# Patient Record
Sex: FEMALE | Race: WHITE | NOT HISPANIC OR LATINO | Employment: OTHER | URBAN - METROPOLITAN AREA
[De-identification: names, ages, dates, MRNs, and addresses within clinical notes are randomized per-mention and may not be internally consistent; named-entity substitution may affect disease eponyms.]

---

## 2018-04-14 ENCOUNTER — HOSPITAL ENCOUNTER (OUTPATIENT)
Dept: RADIOLOGY | Facility: MEDICAL CENTER | Age: 63
End: 2018-04-14
Attending: NURSE PRACTITIONER
Payer: OTHER GOVERNMENT

## 2018-04-14 DIAGNOSIS — M54.5 LOW BACK PAIN, UNSPECIFIED BACK PAIN LATERALITY, UNSPECIFIED CHRONICITY, WITH SCIATICA PRESENCE UNSPECIFIED: ICD-10-CM

## 2018-04-14 DIAGNOSIS — M25.552 BILATERAL HIP PAIN: ICD-10-CM

## 2018-04-14 DIAGNOSIS — M25.551 BILATERAL HIP PAIN: ICD-10-CM

## 2018-04-14 PROCEDURE — 72100 X-RAY EXAM L-S SPINE 2/3 VWS: CPT

## 2018-04-14 PROCEDURE — 73522 X-RAY EXAM HIPS BI 3-4 VIEWS: CPT

## 2018-12-22 ENCOUNTER — HOSPITAL ENCOUNTER (OUTPATIENT)
Dept: RADIOLOGY | Facility: MEDICAL CENTER | Age: 63
End: 2018-12-22
Attending: NURSE PRACTITIONER
Payer: OTHER GOVERNMENT

## 2018-12-22 DIAGNOSIS — M25.552 BILATERAL HIP PAIN: ICD-10-CM

## 2018-12-22 DIAGNOSIS — M25.551 BILATERAL HIP PAIN: ICD-10-CM

## 2018-12-22 DIAGNOSIS — M54.5 CHRONIC LOW BACK PAIN, UNSPECIFIED BACK PAIN LATERALITY, WITH SCIATICA PRESENCE UNSPECIFIED: ICD-10-CM

## 2018-12-22 DIAGNOSIS — G89.29 CHRONIC LOW BACK PAIN, UNSPECIFIED BACK PAIN LATERALITY, WITH SCIATICA PRESENCE UNSPECIFIED: ICD-10-CM

## 2018-12-22 PROCEDURE — 72148 MRI LUMBAR SPINE W/O DYE: CPT

## 2019-05-08 ENCOUNTER — HOSPITAL ENCOUNTER (OUTPATIENT)
Dept: RADIOLOGY | Facility: MEDICAL CENTER | Age: 64
End: 2019-05-08

## 2019-05-21 ENCOUNTER — HOSPITAL ENCOUNTER (OUTPATIENT)
Dept: RADIOLOGY | Facility: MEDICAL CENTER | Age: 64
End: 2019-05-21
Attending: NURSE PRACTITIONER
Payer: OTHER GOVERNMENT

## 2019-05-21 DIAGNOSIS — Z12.31 SCREENING MAMMOGRAM, ENCOUNTER FOR: ICD-10-CM

## 2019-05-21 PROCEDURE — 77067 SCR MAMMO BI INCL CAD: CPT

## 2019-05-30 ENCOUNTER — OFFICE VISIT (OUTPATIENT)
Dept: MEDICAL GROUP | Facility: LAB | Age: 64
End: 2019-05-30
Payer: OTHER GOVERNMENT

## 2019-05-30 VITALS
WEIGHT: 155 LBS | BODY MASS INDEX: 27.46 KG/M2 | RESPIRATION RATE: 14 BRPM | SYSTOLIC BLOOD PRESSURE: 110 MMHG | TEMPERATURE: 96.9 F | HEART RATE: 99 BPM | OXYGEN SATURATION: 93 % | HEIGHT: 63 IN | DIASTOLIC BLOOD PRESSURE: 60 MMHG

## 2019-05-30 DIAGNOSIS — M48.062 SPINAL STENOSIS OF LUMBAR REGION WITH NEUROGENIC CLAUDICATION: ICD-10-CM

## 2019-05-30 DIAGNOSIS — F17.209 TOBACCO USE DISORDER, CONTINUOUS: ICD-10-CM

## 2019-05-30 DIAGNOSIS — M17.12 PRIMARY OSTEOARTHRITIS OF LEFT KNEE: ICD-10-CM

## 2019-05-30 DIAGNOSIS — F51.01 PRIMARY INSOMNIA: ICD-10-CM

## 2019-05-30 DIAGNOSIS — Z23 NEED FOR PNEUMOCOCCAL VACCINATION: ICD-10-CM

## 2019-05-30 DIAGNOSIS — G89.29 OTHER CHRONIC PAIN: ICD-10-CM

## 2019-05-30 DIAGNOSIS — K63.5 POLYP OF COLON, UNSPECIFIED PART OF COLON, UNSPECIFIED TYPE: ICD-10-CM

## 2019-05-30 DIAGNOSIS — F41.9 ANXIETY: ICD-10-CM

## 2019-05-30 PROBLEM — M17.9 OSTEOARTHRITIS OF KNEE: Status: ACTIVE | Noted: 2019-05-30

## 2019-05-30 PROBLEM — M48.061 SPINAL STENOSIS OF LUMBAR REGION: Status: ACTIVE | Noted: 2019-05-30

## 2019-05-30 PROCEDURE — 90471 IMMUNIZATION ADMIN: CPT | Performed by: NURSE PRACTITIONER

## 2019-05-30 PROCEDURE — 90732 PPSV23 VACC 2 YRS+ SUBQ/IM: CPT | Performed by: NURSE PRACTITIONER

## 2019-05-30 PROCEDURE — 99215 OFFICE O/P EST HI 40 MIN: CPT | Mod: 25 | Performed by: NURSE PRACTITIONER

## 2019-05-30 RX ORDER — OLANZAPINE AND FLUOXETINE 12; 25 MG/1; MG/1
1 CAPSULE ORAL DAILY
Qty: 90 CAP | Refills: 3 | Status: SHIPPED | OUTPATIENT
Start: 2019-05-30 | End: 2019-12-30 | Stop reason: SDUPTHER

## 2019-05-30 RX ORDER — PROPRANOLOL HYDROCHLORIDE 10 MG/1
1 TABLET ORAL DAILY
COMMUNITY
Start: 2019-04-09 | End: 2019-09-16 | Stop reason: SDUPTHER

## 2019-05-30 RX ORDER — QUETIAPINE FUMARATE 50 MG/1
50 TABLET, FILM COATED ORAL EVERY EVENING
Qty: 30 TAB | Refills: 2 | Status: SHIPPED | OUTPATIENT
Start: 2019-05-30 | End: 2019-08-30 | Stop reason: SDUPTHER

## 2019-05-30 RX ORDER — OLANZAPINE AND FLUOXETINE 12; 25 MG/1; MG/1
CAPSULE ORAL
COMMUNITY
End: 2019-05-30 | Stop reason: SDUPTHER

## 2019-05-30 ASSESSMENT — PATIENT HEALTH QUESTIONNAIRE - PHQ9: CLINICAL INTERPRETATION OF PHQ2 SCORE: 0

## 2019-05-30 NOTE — PROGRESS NOTES
CC  New patient to establish care    HPI  Penelope is a 63-year-old female, new patient to our office today, previously established with [facility].  Past medical history of smoking addiction, insomnia, anxiety, chronic pain and colon polyps.  Denies any history of cancer, MI or stroke.  She is  with 6 children.  See medication list below.  Also followed by [facility].  She is not followed by chronic pain management or psychiatry.  Last labs 2 weeks ago.  Mammogram is up-to-date.  Last colonoscopy was done about a year ago.  She cannot recall when her last Pap smear was.    Tobacco use disorder, continuous  Started in her 20's.  Plans on quitting when she moves, currently living with her mother who smokes like a smoke stack, per patient, making it difficult for her to quit.     Insomnia  Chronic issue.  Sleeps well with xanax and 3 to 5 pills of an OTC sleep aid from [employer].  States that she has a lot of anxiety about sleeping and functions very poorly when she does not sleep well.    Anxiety  Chronic issue.  Stable with olanzapine-fluoxetine -stating that her moods are the best that they have been in a really long time.      Osteoarthritis of knee - left. (right is replaced)  Chronic issue.  Pain controlled with hydrocodone.  Not currently seeing orthopedics.    Other chronic pain  Suffers from chronic pain in low back that radiates down left hip and down entire left leg (front) and into foot.  Takes 3 hydrocodone per day for this.  Awaiting appt with [facility].  Known lumbar degenerative disc disease, facet arthritis and spinal stenosis  -seen on MRI 12/2018.    Colon polyps - [facility] - q 3 yrs  Last colonoscopy summer of 2018.  Denies any problems with her bowels.    Family History   Problem Relation Age of Onset   • Cancer Paternal Uncle         Colon CA   • Other Mother         dementia   • Cancer Paternal Aunt         colon cancer    • Cancer Paternal Uncle         colon  cancer      Past Surgical History:   Procedure Laterality Date   • KNEE ARTHROPLASTY TOTAL  2/11/2014    Performed by Kushal Rivera M.D. at SURGERY [facility] ORS   • APPENDECTOMY     • CATARACT EXTRACTION WITH IOL      bilateral   • GADIEL BY LAPAROSCOPY     • GYN SURGERY      tubal   • OTHER      facial lift - 2016 - Dr. Faust       Current Outpatient Prescriptions:   •  propranolol, 1 Tab, Oral, DAILY, Taking  •  Olanzapine-Fluoxetine HCl, 1 Tab, Oral, DAILY  •  quetiapine, 50 mg, Oral, Q EVENING  •  ALPRAZolam, 1 mg, Oral, HS PRN, Taking  •  HYDROcodone-acetaminophen, 1-2 Tab, Oral, Q4HRS PRN (Patient taking differently: 1-2 Tab, Oral, EVERY 4 HOURS PRN, Severe Pain, (Pain scale 4-6).), Taking  •  fluticasone-salmeterol, 1 Puff, Inhalation, PRN, Taking  •  albuterol, 2 Puff, Inhalation, Q6HRS PRN, Taking    Review Of Systems  Denies fever, chills, or sweats, unexplained weight changes.  Skin: negative for rash, changing moles, abnormal pigmentation, hair or nail changes.  Eyes: negative for visual blurring, double vision, eye pain, floaters and discharge from eyes  Ears/Nose/Throat: negative for oral or dental problem, hoarseness or frequent URI.  Respiratory: negative for persistent cough, hemoptysis, dyspnea, wheezing  Cardiovascular: Denies chest pain or tachycardia.  Breast: Denies breast tenderness, mass,  changes in size or contour, or abnormal cyclic discomfort.  Gastrointestinal: Black or bloody stools.  Denies chronic abdominal pain.  Genitourinary: Urea, frequency, or hematuria.  Musculoskeletal: Knees and her back as well as her left leg  Neurologic: negative for new or changing headaches  Psychiatric: Satiety.  Negative for depression.  Positive for sleep disturbance without medication.  Hematologic/Lymphatic/Immunologic: negative for pallor, unusual bruising.  Endocrine: Occasional temperature intolerance.    Exam:  /60 (BP Location: Left arm, Patient Position: Sitting, BP Cuff Size:  "Large adult)   Pulse 99   Temp 36.1 °C (96.9 °F)   Resp 14   Ht 1.6 m (5' 3\")   Wt 70.3 kg (155 lb)   SpO2 93%   Gen. appears healthy in no distress   Skin appropriate for sex and age   Neck trachea is midline  Lungs unlabored breathing  Heart regular rate  Neuro gait and station normal   Psych appropriate, calm, interactive, well-groomed    Assessment / Plan / Medical Decision makin. Tobacco use disorder, continuous  -Urged her in the strongest way to stop smoking and she states that she is trying.  Occasions today which she declines.    2. Primary insomnia  -Unfortunately dependent on alprazolam for sleep right now which I discussed with her that I cannot prescribe in combination with opiates because of the black box warning between opiates and benzodiazepines, putting her at high risk for respiratory depression and death.  We will do a trial of Seroquel, allowing at least 8 hours for sleep.  Half an alprazolam which we discussed the potential for benzodiazepine withdrawal states that she has gone a few nights without it in the past and not had any problems.  Follow-up with me in a few days on email regarding how she is sleeping.  She will return here in 1 month in person.  - quetiapine (SEROQUEL) 50 MG tablet; Take 1 Tab by mouth every evening.  Dispense: 30 Tab; Refill: 2    3. Anxiety  -Stable.  Continue same.    4. Primary osteoarthritis of left knee  -She describes her left knee pain is mild and declines a referral to see orthopedics.    5. Other chronic pain  -Not followed by pain management yet, awaiting an appointment with [facility].  Did not need a refill of hydrocodone today.    6. Spinal stenosis of lumbar region with neurogenic claudication  -Reviewed her MRI with her in depth.  She is looking forward to starting physical therapy and seeing [facility].    7. Polyp of colon, unspecified part of colon, unspecified type  -Colonoscopy is up-to-date.    8. Need for pneumococcal " vaccination  I have placed the below orders and discussed them with Dr. Bullard. the MA is performing the below orders under the direction of Dr. TOBAR  - Pneumococal Polysaccharide Vaccine 23-Valent =>1YO SQ/IM      She will follow-up with me in 1 month pain management, discussion of anxiety, sleep and a Pap smear.    Total face to face time40 minutes of which over 50% of this visit is spent in counseling, education and outlining a plan of treatment and coordination of care for the above conditions. This included but was not limited to discussion of medication options and potential risks related to the medications, referral and specialty care options. All patient questions were answered

## 2019-05-30 NOTE — LETTER
[facility]  Laura Benjamin, A.P.N.  [id number]  [place] [address]  Fax: [phone]   Authorization for Release/Disclosure of   Protected Health Information   Name: VERONICA GRANGER : 1955 SSN: [id number]   Address: [address] [place], [place] [address] [address] Phone:    [phone] (home)    I authorize the entity listed below to release/disclose the PHI below to:   [facility]/Laura Benjamin, A.P.N. and Laura Benjamin, A.P.N.   Provider or Entity Name:  [facility]   Address   City, [place] [facility] [place], Zip:               [address] [place] [address]   Phone:  [phone]      Fax:      [phone]        Reason for request: continuity of care   Information to be released:    [ X ] LAST COLONOSCOPY,  including any PATH REPORT and follow-up  [ X ] LAST FIT/COLOGUARD RESULT [  ] LAST DEXA  [  ] LAST MAMMOGRAM  [  ] LAST PAP  [  ] LAST LABS [  ] RETINA EXAM REPORT  [  ] IMMUNIZATION RECORDS  [  ] Release all info      [  ] Check here and initial the line next to each item to release ALL health information INCLUDING  _____ Care and treatment for drug and / or alcohol abuse  _____ HIV testing, infection status, or AIDS  _____ Genetic Testing    DATES OF SERVICE OR TIME PERIOD TO BE DISCLOSED: _____________  I understand and acknowledge that:  * This Authorization may be revoked at any time by you in writing, except if your health information has already been used or disclosed.  * Your health information that will be used or disclosed as a result of you signing this authorization could be re-disclosed by the recipient. If this occurs, your re-disclosed health information may no longer be protected by State or Federal laws.  * You may refuse to sign this Authorization. Your refusal will not affect your ability to obtain treatment.  * This Authorization becomes effective upon signing and will  on (date) __________.      If no date is indicated, this Authorization  will  one (1) year from the signature date.    Name: Penelope Segoviadwin    Signature:    Date:     2019       PLEASE FAX REQUESTED RECORDS BACK TO: [phone]

## 2019-05-30 NOTE — ASSESSMENT & PLAN NOTE
Suffers from chronic pain in low back that radiates down left hip and down entire left leg (front) and into foot.  Takes 3 hydrocodone per day for this.  Awaiting appt with [facility].

## 2019-06-27 ENCOUNTER — OFFICE VISIT (OUTPATIENT)
Dept: MEDICAL GROUP | Facility: LAB | Age: 64
End: 2019-06-27
Payer: OTHER GOVERNMENT

## 2019-06-27 ENCOUNTER — HOSPITAL ENCOUNTER (OUTPATIENT)
Facility: MEDICAL CENTER | Age: 64
End: 2019-06-27
Attending: NURSE PRACTITIONER
Payer: OTHER GOVERNMENT

## 2019-06-27 VITALS
HEART RATE: 58 BPM | HEIGHT: 63 IN | TEMPERATURE: 97.9 F | WEIGHT: 153 LBS | BODY MASS INDEX: 27.11 KG/M2 | DIASTOLIC BLOOD PRESSURE: 68 MMHG | OXYGEN SATURATION: 93 % | RESPIRATION RATE: 16 BRPM | SYSTOLIC BLOOD PRESSURE: 110 MMHG

## 2019-06-27 DIAGNOSIS — Z01.419 ENCOUNTER FOR GYNECOLOGICAL EXAMINATION: ICD-10-CM

## 2019-06-27 DIAGNOSIS — Z12.4 SCREENING FOR MALIGNANT NEOPLASM OF CERVIX: ICD-10-CM

## 2019-06-27 DIAGNOSIS — M48.062 SPINAL STENOSIS OF LUMBAR REGION WITH NEUROGENIC CLAUDICATION: ICD-10-CM

## 2019-06-27 DIAGNOSIS — R73.01 ELEVATED FASTING BLOOD SUGAR: ICD-10-CM

## 2019-06-27 DIAGNOSIS — R30.0 DYSURIA: ICD-10-CM

## 2019-06-27 DIAGNOSIS — F51.01 PRIMARY INSOMNIA: ICD-10-CM

## 2019-06-27 DIAGNOSIS — Z79.891 CHRONIC USE OF OPIATE DRUGS THERAPEUTIC PURPOSES: ICD-10-CM

## 2019-06-27 DIAGNOSIS — R79.89 ELEVATED LIVER FUNCTION TESTS: ICD-10-CM

## 2019-06-27 DIAGNOSIS — E55.9 VITAMIN D DEFICIENCY: ICD-10-CM

## 2019-06-27 LAB
APPEARANCE UR: CLEAR
BILIRUB UR STRIP-MCNC: NORMAL MG/DL
COLOR UR AUTO: YELLOW
CYTOLOGY REG CYTOL: NORMAL
GLUCOSE UR STRIP.AUTO-MCNC: NORMAL MG/DL
KETONES UR STRIP.AUTO-MCNC: NORMAL MG/DL
LEUKOCYTE ESTERASE UR QL STRIP.AUTO: NORMAL
NITRITE UR QL STRIP.AUTO: NORMAL
PH UR STRIP.AUTO: 7 [PH] (ref 5–8)
PROT UR QL STRIP: 30 MG/DL
RBC UR QL AUTO: NORMAL
SP GR UR STRIP.AUTO: 1.02
UROBILINOGEN UR STRIP-MCNC: 0.2 MG/DL

## 2019-06-27 PROCEDURE — 99396 PREV VISIT EST AGE 40-64: CPT | Mod: 25 | Performed by: NURSE PRACTITIONER

## 2019-06-27 PROCEDURE — 81002 URINALYSIS NONAUTO W/O SCOPE: CPT | Performed by: NURSE PRACTITIONER

## 2019-06-27 PROCEDURE — 99214 OFFICE O/P EST MOD 30 MIN: CPT | Mod: 25 | Performed by: NURSE PRACTITIONER

## 2019-06-27 PROCEDURE — 88175 CYTOPATH C/V AUTO FLUID REDO: CPT

## 2019-06-27 RX ORDER — HYDROCODONE BITARTRATE AND ACETAMINOPHEN 7.5; 325 MG/1; MG/1
1 TABLET ORAL 2 TIMES DAILY PRN
Qty: 56 TAB | Refills: 0 | Status: SHIPPED | OUTPATIENT
Start: 2019-06-27 | End: 2019-09-16 | Stop reason: SDUPTHER

## 2019-06-27 RX ORDER — HYDROCODONE BITARTRATE AND ACETAMINOPHEN 7.5; 325 MG/1; MG/1
1 TABLET ORAL 2 TIMES DAILY PRN
Qty: 56 TAB | Refills: 0 | Status: SHIPPED | OUTPATIENT
Start: 2019-08-22 | End: 2019-09-16 | Stop reason: SDUPTHER

## 2019-06-27 RX ORDER — HYDROCODONE BITARTRATE AND ACETAMINOPHEN 7.5; 325 MG/1; MG/1
1 TABLET ORAL 2 TIMES DAILY PRN
Qty: 56 TAB | Refills: 0 | Status: SHIPPED | OUTPATIENT
Start: 2019-07-25 | End: 2019-09-16 | Stop reason: SDUPTHER

## 2019-06-27 NOTE — PROGRESS NOTES
Chief Complaint   Patient presents with   • Annual Exam     PAP       HPI:   Penelope is a 63-year-old established female here for chronic pain management.  Previously her Norco was prescribed by Dr Smith, previous primary care physician.  Chronic pain recheck for: Spinal stenosis of her lumbar spine.  Last dose of controlled substance: yesterday - previously rx by Dr. Smith  Chronic pain treated with norco taken 2-3 times a day -tells me she is able to reduce this to 2/day in the summer.  Last percocet was about 4 d ago following a plastic surgery procedure.   [facility]aw [facility] yesterday - planning on PT  She  reports that she does not drink alcohol.  She  reports that she does not use drugs.  She was previously taking Xanax for sleep, I prescribed Seroquel at her last visit and this is working great.  She has discontinued her use of Xanax.  Interval history: this is her first visit for pain with me    Consequences of Chronic Opiate therapy:  (5 A's)  Analgesia: Compared to no treatment or prior treatment, pain is currently not changed  Activity: not changed  Adverse Events:denies constipation, dry mouth, itchy skin, nausea and sedation  Aberrant Behaviors: She reports she is taking medication as prescribed and is not veering from agreed treatment regimen or provider recommendations. There have been no inappropriate refills or lost/stolen meds reported.  Affect/Mood: Pain is not impacting patient's mood.  Patient denies depression/anxiety.    Nonnarcotic treatments that are being used: physical therapy, topical agents, ice and heat.     Last imaging: Uncertain, will obtain from [facility]    Opioid Risk Score: 2    Interpretation of Opioid Risk Score   Score 0-3 = Low risk of abuse. Do UDS at least once per year.  Score 4-7 = Moderate risk of abuse. Do UDS 1-4 times per year.  Score 8+ = High risk of abuse. Refer to specialist.    Last order of CONTROLLED SUBSTANCE TREATMENT AGREEMENT was found on 12/28/2017 from Office  "Visit on 2017     UDS Summary                URINE DRUG SCREEN Next Due 2019      Done 2018 [facility] PAIN MANAGEMENT SCREEN     Patient has more history with this topic...        UDS will be collected initially today in our office.    I have reviewed the medical records, the Prescription Monitoring Program and I have determined that controlled substance treatment is medically indicated.     #2-vitamin D deficiency: She tells me this has been an issue for her on and off over the years.  Not currently taking any vitamin D.  #3-elevated fasting blood sugar: She had lab work done in May that showed a fasting blood sugar of 107.  She tells me that she goes back and forth in between eating too much sugar.  She has never been diagnosed with type 2 diabetes.  #4- elevated liver function testing: She does not drink alcohol, takes about 2-3 Tylenol per day as part of her pain pill and is not having any abdominal issues.  She denies any history of being told that her liver is irritated.    Past medical, surgical, family, and social history is reviewed and updated in Epic chart by me today.   Medications and allergies reviewed and updated in Epic chart by me today.     ROS:   As documented in history of present illness above    Exam:  /68 (BP Location: Left arm, Patient Position: Sitting, BP Cuff Size: Adult)   Pulse (!) 58   Temp 36.6 °C (97.9 °F)   Resp 16   Ht 1.6 m (5' 3\")   Wt 69.4 kg (153 lb)   SpO2 93%   Constitutional: Alert, no distress, plus 3 vital signs  Skin:  Warm, dry, no rashes invisible areas  Eye: Equal, round and reactive, conjunctiva clear  ENMT: Lips without lesions  Respiratory: Unlabored respiration  Cardiovascular: Normal rate and rhythm  Psych: Alert, pleasant, well-groomed, normal affect    Assessment/ Plan / Medical Decision makin. Chronic pain from:    Spinal stenosis of her lumbar spine  2. Chronic use of opiate drugs therapeutic purposes  -Overall impression that " this patient is benefiting from opioid therapy and that the benefits outweigh the risks of continued use. yes   Controlled substance agreement and urine drug screen updated today  - Additional lab: CMP to assess liver and renal function - UTD   - Rx refill prescriptions are done for 3 months, No early refills.   - Referral to pain management no  In prescribing controlled substances to this patient, I certify that I have obtained and reviewed the medical history.. I have also made a good abelardo effort to obtain applicable records from other providers who have treated the patient and records did not demonstrate any increased risk of substance abuse that would prevent me from prescribing controlled substances.     I have conducted a physical exam and documented it. I have reviewed Ms. Saini’s prescription history as maintained by the [place] Prescription Monitoring Program.     I have assessed the patient’s risk for abuse, dependency, and addiction using the validated Opioid Risk Tool available at https://www.mdcalc.com/bqlxzr-mjit-thgr-ort-narcotic-abuse.     Given the above, I believe the benefits of controlled substance therapy outweigh the risks. The reasons for prescribing controlled substances include non-narcotic, oral analgesic alternatives have been inadequate for pain control. Accordingly, I have discussed the risk and benefits, treatment plan, and alternative therapies with the patient.     3.  Vitamin D deficiency: Encouraged her to start 2 to 4000 international units of vitamin D.  We will recheck this in about 6 weeks.    #4-elevated fasting blood sugar: Encouraged her to reduce her intake of sugar in her diet, increase exercise and recheck labs in 6 weeks with an A1c.    #5- elevated liver function testing: Unknown etiology.  Recommend a repeat in 6 weeks and if persistently elevated, liver ultrasound.

## 2019-06-27 NOTE — PROGRESS NOTES
Chief Complaint   Patient presents with   • Annual Exam     PAP       HPI:  Penelope is a 63 y.o.  female who presents for annual GYN exam.  Regarding her health maintenance:   Last pap: unknown  Abnormal Pap hx: a very long time ago - decades  Periods: menopausal  Last Mammo:5/21/2019  Last colonoscopy: 10/2018  Bone density test:N\A   Last Lab: just completed  Last Td:declines  Influenza vaccination:current   Pneumococcal vaccination: Just completed  Hx STD''s: no   Regular exercise: yes      meds:   Current Outpatient Prescriptions   Medication Sig Dispense Refill   • propranolol (INDERAL) 10 MG Tab Take 1 Tab by mouth every day.     • Olanzapine-Fluoxetine HCl 12-25 MG Cap Take 1 Tab by mouth every day. 90 Cap 3   • quetiapine (SEROQUEL) 50 MG tablet Take 1 Tab by mouth every evening. 30 Tab 2   •       • hydrocodone-acetaminophen (NORCO) 7.5-325 MG per tablet Take 1-2 Tabs by mouth every four hours as needed ((Pain scale 4-6).). (Patient taking differently: Take 1-2 Tabs by mouth every four hours as needed for Severe Pain ((Pain scale 4-6).).) 60 Each 0   • fluticasone-salmeterol (ADVAIR DISKUS) 250-50 MCG/DOSE AEPB Inhale 1 Puff by mouth as needed. Indications: Asthma     • albuterol (PROAIR HFA) 108 (90 BASE) MCG/ACT AERS Inhale 2 Puffs by mouth every 6 hours as needed. Indications: Asthma       No current facility-administered medications for this visit.        Allergies: No Known Allergies    family:   Family History   Problem Relation Age of Onset   • Cancer Paternal Uncle         Colon CA   • Other Mother         dementia   • Cancer Paternal Aunt         colon cancer    • Cancer Paternal Uncle         colon cancer        social hx:   Social History     Social History   • Marital status:      Spouse name: N/A   • Number of children: N/A   • Years of education: N/A     Occupational History   • Not on file.     Social History Main Topics   • Smoking status: Current Every Day Smoker     Packs/day: 0.50  "    Years: 34.00     Types: Cigarettes   • Smokeless tobacco: Never Used      Comment: 0.5-1 ppd   • Alcohol use No   • Drug use: No   • Sexual activity: Yes     Partners: Male      Comment: ; 6 kids; wk: retired - [employer]     Other Topics Concern   • Not on file     Social History Narrative   • No narrative on file       ROS:  No fever, chills, sweats.   No polydipsia, polyuria, temperature intolerance, significant weight changes   No visual changes, blurred vision.  No chest pain, palpitations, peripheral swelling   No chronic cough, shortness of breath, dyspnea with exertion.   No dysphagia, odynophagia, black or bloody stools.   No abdominal pain, nausea, persistent diarrhea, constipation   She does complain of dysuria and frequency, present now for several days.  No hematuria.  No rash, pruritis, pigment changes.   No focal weakness, syncope, headache, confusion, persistent numbness.     PHYSICAL EXAMINATION:  /68 (BP Location: Left arm, Patient Position: Sitting, BP Cuff Size: Adult)   Pulse (!) 58   Temp 36.6 °C (97.9 °F)   Resp 16   Ht 1.6 m (5' 3\")   Wt 69.4 kg (153 lb)   SpO2 93%   General appearance:healthy, well developed, well nourished  Psych: alert, no distress, cooperative  Eyes: EOM's normal, pupils equal, round, reactive to light  ENT: Ears: external ears normal to inspection and palpation, TM's clear, Nose/Sinuses: nose shows no deformity, asymmetry, or inflammation  Neck: no asymmetry, masses, or scars, no adenopathy, thyroid normal to palpation  Lungs:chest symmetric with normal A/P diameter, no chest deformities noted, normal respiratory rate and rhythm  Cardiovascular:regular rate and rhythm, S1 normal  Breasts: normal in size and symmetry, skin normal, physiologic fibronodularity  Abdomen: umbilicus normal, no masses palpable, no organomegaly  Musculoskeletal:ROM of all joints is normal, no evidence of joint instability  Lymphatic: None significantly enlarged  Skin: no " rash, no edema.  She has a healing incision to the ventral aspect of her right upper arm, previous plastic surgery site.  Neuro: mental status intact, cranial nerves 2-12 intact  Pelvic: external genitalia normal, cervix normal in appearance, bimanual exam reveals normal uterus, adnexa without masses or tenderness, vaginal mucosa normal      ASSESSMENT/PLAN:  1.annual physical exam: HCM:  Pap and breast exams done.  BSE technique reviewed and patient encouraged to perform self-exam monthly.   Encourage monthly self breast exam  Encourage daily exercise for at least 30 minutes  Mammogram is up-to-date.  Recommend consideration of a DEXA scan in the next few years.  Encouraged calcium and vitamin D.  She cannot recall if she is ever had a DEXA scan.  Colonoscopy is up-to-date.  Declined updated Tdap.  We do not have Shingrix in stock.  Recommend 1500 mg Calcium with 600 units vit d daily.    Urinalysis was negative today.  She tells us that her symptoms often appear when she is dehydrated as well.  I encouraged her to increase hydration and notify me within 72 hours if her symptoms have not resolved.

## 2019-07-03 DIAGNOSIS — Z79.891 CHRONIC USE OF OPIATE DRUGS THERAPEUTIC PURPOSES: ICD-10-CM

## 2019-08-30 DIAGNOSIS — F51.01 PRIMARY INSOMNIA: ICD-10-CM

## 2019-08-30 RX ORDER — QUETIAPINE FUMARATE 50 MG/1
TABLET, FILM COATED ORAL
Qty: 30 TAB | Refills: 1 | Status: SHIPPED | OUTPATIENT
Start: 2019-08-30 | End: 2019-09-16 | Stop reason: SDUPTHER

## 2019-08-31 LAB
25(OH)D3+25(OH)D2 SERPL-MCNC: 23.2 NG/ML (ref 30–100)
ALBUMIN SERPL-MCNC: 4.5 G/DL (ref 3.6–4.8)
ALP SERPL-CCNC: 117 IU/L (ref 39–117)
ALT SERPL-CCNC: 37 IU/L (ref 0–32)
AST SERPL-CCNC: 23 IU/L (ref 0–40)
BILIRUB DIRECT SERPL-MCNC: 0.07 MG/DL (ref 0–0.4)
BILIRUB SERPL-MCNC: 0.2 MG/DL (ref 0–1.2)
HBA1C MFR BLD: 6 % (ref 4.8–5.6)
PROT SERPL-MCNC: 6.6 G/DL (ref 6–8.5)

## 2019-09-16 ENCOUNTER — OFFICE VISIT (OUTPATIENT)
Dept: MEDICAL GROUP | Facility: LAB | Age: 64
End: 2019-09-16
Payer: OTHER GOVERNMENT

## 2019-09-16 ENCOUNTER — HOSPITAL ENCOUNTER (OUTPATIENT)
Facility: MEDICAL CENTER | Age: 64
End: 2019-09-16
Attending: NURSE PRACTITIONER
Payer: OTHER GOVERNMENT

## 2019-09-16 VITALS
HEIGHT: 63 IN | RESPIRATION RATE: 14 BRPM | OXYGEN SATURATION: 94 % | WEIGHT: 155 LBS | BODY MASS INDEX: 27.46 KG/M2 | DIASTOLIC BLOOD PRESSURE: 52 MMHG | TEMPERATURE: 98.1 F | HEART RATE: 54 BPM | SYSTOLIC BLOOD PRESSURE: 90 MMHG

## 2019-09-16 DIAGNOSIS — M48.062 SPINAL STENOSIS OF LUMBAR REGION WITH NEUROGENIC CLAUDICATION: ICD-10-CM

## 2019-09-16 DIAGNOSIS — E66.3 OVERWEIGHT (BMI 25.0-29.9): ICD-10-CM

## 2019-09-16 DIAGNOSIS — R39.15 URINARY URGENCY: ICD-10-CM

## 2019-09-16 DIAGNOSIS — R35.0 URINARY FREQUENCY: ICD-10-CM

## 2019-09-16 DIAGNOSIS — Z23 NEED FOR INFLUENZA VACCINATION: ICD-10-CM

## 2019-09-16 DIAGNOSIS — G89.29 CHRONIC LEFT HIP PAIN: ICD-10-CM

## 2019-09-16 DIAGNOSIS — M25.552 CHRONIC LEFT HIP PAIN: ICD-10-CM

## 2019-09-16 DIAGNOSIS — J45.40 MODERATE PERSISTENT ASTHMA WITHOUT COMPLICATION: ICD-10-CM

## 2019-09-16 DIAGNOSIS — F51.01 PRIMARY INSOMNIA: ICD-10-CM

## 2019-09-16 DIAGNOSIS — Z79.891 CHRONIC USE OF OPIATE FOR THERAPEUTIC PURPOSE: ICD-10-CM

## 2019-09-16 PROCEDURE — 90471 IMMUNIZATION ADMIN: CPT | Performed by: NURSE PRACTITIONER

## 2019-09-16 PROCEDURE — 87086 URINE CULTURE/COLONY COUNT: CPT

## 2019-09-16 PROCEDURE — 99214 OFFICE O/P EST MOD 30 MIN: CPT | Mod: 25 | Performed by: NURSE PRACTITIONER

## 2019-09-16 PROCEDURE — 96372 THER/PROPH/DIAG INJ SC/IM: CPT | Performed by: NURSE PRACTITIONER

## 2019-09-16 PROCEDURE — 90686 IIV4 VACC NO PRSV 0.5 ML IM: CPT | Performed by: NURSE PRACTITIONER

## 2019-09-16 RX ORDER — HYDROCODONE BITARTRATE AND ACETAMINOPHEN 7.5; 325 MG/1; MG/1
1 TABLET ORAL 2 TIMES DAILY PRN
Qty: 56 TAB | Refills: 0 | Status: SHIPPED | OUTPATIENT
Start: 2019-10-22 | End: 2019-12-23 | Stop reason: SDUPTHER

## 2019-09-16 RX ORDER — PHENTERMINE HYDROCHLORIDE 37.5 MG/1
37.5 TABLET ORAL
Qty: 30 TAB | Refills: 2 | Status: SHIPPED
Start: 2019-09-16 | End: 2019-12-18

## 2019-09-16 RX ORDER — ALBUTEROL SULFATE 90 UG/1
2 AEROSOL, METERED RESPIRATORY (INHALATION) EVERY 6 HOURS PRN
Qty: 3 INHALER | Refills: 3 | Status: SHIPPED | OUTPATIENT
Start: 2019-09-16 | End: 2021-08-12 | Stop reason: SDUPTHER

## 2019-09-16 RX ORDER — CYANOCOBALAMIN 1000 UG/ML
1000 INJECTION, SOLUTION INTRAMUSCULAR; SUBCUTANEOUS ONCE
Status: COMPLETED | OUTPATIENT
Start: 2019-09-16 | End: 2019-09-16

## 2019-09-16 RX ORDER — PROPRANOLOL HYDROCHLORIDE 10 MG/1
10 TABLET ORAL DAILY
Qty: 90 TAB | Refills: 3 | Status: SHIPPED | OUTPATIENT
Start: 2019-09-16 | End: 2020-08-24

## 2019-09-16 RX ORDER — HYDROCODONE BITARTRATE AND ACETAMINOPHEN 7.5; 325 MG/1; MG/1
1 TABLET ORAL 2 TIMES DAILY PRN
Qty: 56 TAB | Refills: 0 | Status: SHIPPED | OUTPATIENT
Start: 2019-11-19 | End: 2019-12-23 | Stop reason: SDUPTHER

## 2019-09-16 RX ORDER — QUETIAPINE FUMARATE 50 MG/1
TABLET, FILM COATED ORAL
Qty: 90 TAB | Refills: 3 | Status: SHIPPED | OUTPATIENT
Start: 2019-09-16 | End: 2020-01-20

## 2019-09-16 RX ORDER — HYDROCODONE BITARTRATE AND ACETAMINOPHEN 7.5; 325 MG/1; MG/1
1 TABLET ORAL 2 TIMES DAILY PRN
Qty: 56 TAB | Refills: 0 | Status: SHIPPED | OUTPATIENT
Start: 2019-09-24 | End: 2019-12-23 | Stop reason: SDUPTHER

## 2019-09-16 RX ADMIN — CYANOCOBALAMIN 1000 MCG: 1000 INJECTION, SOLUTION INTRAMUSCULAR; SUBCUTANEOUS at 11:37

## 2019-09-16 NOTE — PROGRESS NOTES
Chief Complaint   Patient presents with   • Chronic Opiate Therapy       HPI  Penelope is a 63 yo est female here to f/u on a couple of issues:     #1- Chronic pain recheck for: Spinal stenosis of her lumbar spine.  Last dose of controlled substance: this morning  Taking norco per day.  Now taking phentermine as well in terms of controlled substances.     She  reports that she does not drink alcohol.  She  reports that she does not use drugs.  No more xanax - taking seroquel for sleep  Interval history: she is seen every 3 months.       Consequences of Chronic Opiate therapy:  (5 A's)  Analgesia: Compared to no treatment or prior treatment, pain is currently not changed  Activity: not changed  Adverse Events:denies constipation, dry mouth, itchy skin, nausea and sedation  Aberrant Behaviors: She reports she is taking medication as prescribed and is not veering from agreed treatment regimen or provider recommendations. There have been no inappropriate refills or lost/stolen meds reported.  Affect/Mood: Pain is not impacting patient's mood.  Patient denies depression/anxiety.     Nonnarcotic treatments that are being used: occasional tylenol or advil     Last imagin - MRI spine and x-ray hip    #2-overweight: She would like to get down to 140 pounds as this really helps with her chronic pain.  She has been attending a weight loss clinic for B12 injections and phentermine prescriptions, this is expensive for her and she would like to obtain these through our office.  She denies any negative side effects of phentermine such as heart palpitations or anxiety.    #3-insomnia: Chronic issue.  Doing very well with Seroquel.  Can wake up easily and denies a morning hangover or weight gain.    #4-urinary frequency and urgency: This is been an issue for the patient for about 4 to 5 months.  Denies dysuria or hematuria.  No fevers or chills.  Denies chronic abdominal pain.  Has urinary leakage with the urgency.  Last  "urinalysis was negative at our visit 3 months ago.  She does enjoy approximately 2 to 3 cups of coffee in the morning, is a smoker and takes periodic NSAIDs.    Past medical, surgical, family, and social history is reviewed and updated in Epic chart by me today.   Medications and allergies reviewed and updated in Epic chart by me today.     ROS:   As documented in history of present illness above    Exam:  BP (!) 90/52 (BP Location: Left arm, Patient Position: Sitting, BP Cuff Size: Large adult)   Pulse (!) 54   Temp 36.7 °C (98.1 °F)   Resp 14   Ht 1.6 m (5' 3\")   Wt 70.3 kg (155 lb)   SpO2 94%   Constitutional: Alert, no distress, plus 3 vital signs  Skin:  Warm, dry, no rashes invisible areas  Eye: Equal, round and reactive, conjunctiva clear  ENMT: Lips without lesions, good dentition, oropharynx clear    Neck: Trachea midline, no masses, no thyromegaly  Respiratory: Unlabored respiration, lungs clear to auscultation, no wheezes, no rhonchi  Cardiovascular: Normal rate and rhythm, no murmur, no edema  Abdomen: Soft, nontender, no masses or hepatosplenomegaly  Psych: Alert, pleasant, well-groomed, normal affect    Assessment / Plan / Medical Decision makin. Urinary urgency  -Recommend sending for a culture and if negative, possible trial of medications for overactive bladder with pending urology consult.  Encouraged her to cut back on caffeine, stop smoking, avoid NSAIDs, spicy and fried foods.  - URINE CULTURE(NEW); Future    2. Urinary frequency  - URINE CULTURE(NEW); Future    3. Chronic left hip pain  -She tells me this is worsening.  I would like for her to have an MRI.  Suspect her hip pain is from her spine which she is very skeptical about.  I will follow-up with her with MRI results and if negative, encouraged her to pursue physiatry and neurosurgery consults more aggressively.  - MR-HIP-W/O LEFT; Future  - HYDROcodone-acetaminophen (NORCO) 7.5-325 MG per tablet; Take 1 Tab by mouth 2 times a " day as needed for up to 28 days.  Dispense: 56 Tab; Refill: 0  - HYDROcodone-acetaminophen (NORCO) 7.5-325 MG per tablet; Take 1 Tab by mouth 2 times a day as needed for up to 28 days.  Dispense: 56 Tab; Refill: 0  - HYDROcodone-acetaminophen (NORCO) 7.5-325 MG per tablet; Take 1 Tab by mouth 2 times a day as needed for up to 28 days.  Dispense: 56 Tab; Refill: 0    4. Primary insomnia  -Stable.  Continue same.  - quetiapine (SEROQUEL) 50 MG tablet; TAKE ONE TABLET BY MOUTH EVERY EVENING  Dispense: 90 Tab; Refill: 3  - propranolol (INDERAL) 10 MG Tab; Take 1 Tab by mouth every day. For palpitations / sleep  Dispense: 90 Tab; Refill: 3    5. Moderate persistent asthma without complication  -This was not specifically addressed today as she states that she is doing fine but does need refills of Advair and Pro Air.  - fluticasone-salmeterol (ADVAIR DISKUS) 250-50 MCG/DOSE AEROSOL POWDER, BREATH ACTIVATED; Inhale 1 Puff by mouth as needed. Indications: Asthma  Dispense: 3 Inhaler; Refill: 3  - albuterol (PROAIR HFA) 108 (90 Base) MCG/ACT Aero Soln inhalation aerosol; Inhale 2 Puffs by mouth every 6 hours as needed. Indications: Asthma  Dispense: 3 Inhaler; Refill: 3    6. Spinal stenosis of lumbar region with neurogenic claudication  -Persistent.  Failed to schedule with physical therapy, stating life has been very busy but she plans on pursuing this further after MRI of her hip results return.  - HYDROcodone-acetaminophen (NORCO) 7.5-325 MG per tablet; Take 1 Tab by mouth 2 times a day as needed for up to 28 days.  Dispense: 56 Tab; Refill: 0  - HYDROcodone-acetaminophen (NORCO) 7.5-325 MG per tablet; Take 1 Tab by mouth 2 times a day as needed for up to 28 days.  Dispense: 56 Tab; Refill: 0  - HYDROcodone-acetaminophen (NORCO) 7.5-325 MG per tablet; Take 1 Tab by mouth 2 times a day as needed for up to 28 days.  Dispense: 56 Tab; Refill: 0    7. Overweight (BMI 25.0-29.9)  -Discussed the risk versus benefit of  phentermine with the patient including but not limited to potential increased risk of tachycardia, hypertension, insomnia and anxiety.  Her blood pressure and heart rate are very stable today.  Follow-up 3 months.  - phentermine (ADIPEX-P) 37.5 MG tablet; Take 1 Tab by mouth every morning before breakfast for 30 days.  Dispense: 30 Tab; Refill: 2  - cyanocobalamin (VITAMIN B-12) injection 1,000 mcg    8. Need for influenza vaccination  - Influenza Vaccine Quad Injection (PF)    9. Chronic use of opiate for therapeutic purpose  Overall impression that this patient is benefiting from opioid therapy and that the benefits outweigh the risks of continued use. yes   - Controlled Substance Use Agreement current  - Urine drug screen current and reviewed / compliant with rx medications  - Additional lab: CMP to assess liver and renal function - UTD   - Rx refill prescriptions are done for 3 months, No early refills.   - Referral to pain management no  In prescribing controlled substances to this patient, I certify that I have obtained and reviewed the medical history of Penelope Bautista. I have also made a good abelardo effort to obtain applicable records from other providers who have treated the patient and records did not demonstrate any increased risk of substance abuse that would prevent me from prescribing controlled substances.     I have conducted a physical exam and documented it. I have reviewed Ms. Bautista’s prescription history as maintained by the [place] Prescription Monitoring Program.     I have assessed the patient’s risk for abuse, dependency, and addiction using the validated Opioid Risk Tool available at https://www.mdcalc.com/goxtqd-ccsh-ylhh-ort-narcotic-abuse.     Given the above, I believe the benefits of controlled substance therapy outweigh the risks. The reasons for prescribing controlled substances include non-narcotic, oral analgesic alternatives have been inadequate for pain control. Accordingly, I  have discussed the risk and benefits, treatment plan, and alternative therapies with the patient.

## 2019-09-17 ENCOUNTER — TELEPHONE (OUTPATIENT)
Dept: MEDICAL GROUP | Facility: LAB | Age: 64
End: 2019-09-17

## 2019-09-17 NOTE — TELEPHONE ENCOUNTER
MEDICATION PRIOR AUTHORIZATION NEEDED:    1. Name of Medication: phentermine    2. Requested By (Name of Pharmacy): [facility]     3. Is insurance on file current? yes    4. What is the name & phone number of the 3rd party payor? [id number]

## 2019-09-19 LAB
BACTERIA UR CULT: NORMAL
SIGNIFICANT IND 70042: NORMAL
SITE SITE: NORMAL
SOURCE SOURCE: NORMAL

## 2019-09-19 RX ORDER — OXYBUTYNIN CHLORIDE 10 MG/1
10 TABLET, EXTENDED RELEASE ORAL DAILY
Qty: 30 TAB | Refills: 2 | Status: SHIPPED | OUTPATIENT
Start: 2019-09-19 | End: 2019-12-23 | Stop reason: SDUPTHER

## 2019-09-26 ENCOUNTER — HOSPITAL ENCOUNTER (OUTPATIENT)
Dept: RADIOLOGY | Facility: MEDICAL CENTER | Age: 64
End: 2019-09-26
Attending: NURSE PRACTITIONER
Payer: OTHER GOVERNMENT

## 2019-09-26 DIAGNOSIS — G89.29 CHRONIC LEFT HIP PAIN: ICD-10-CM

## 2019-09-26 DIAGNOSIS — M25.552 CHRONIC LEFT HIP PAIN: ICD-10-CM

## 2019-09-26 PROCEDURE — 73721 MRI JNT OF LWR EXTRE W/O DYE: CPT | Mod: LT

## 2019-09-27 DIAGNOSIS — S76.019A: ICD-10-CM

## 2019-12-18 DIAGNOSIS — E66.3 OVERWEIGHT (BMI 25.0-29.9): ICD-10-CM

## 2019-12-18 RX ORDER — PHENTERMINE HYDROCHLORIDE 37.5 MG/1
TABLET ORAL
Qty: 30 TAB | Refills: 1 | Status: SHIPPED
Start: 2019-12-18 | End: 2020-01-17

## 2019-12-18 NOTE — TELEPHONE ENCOUNTER
Was the patient seen in the last year in this department? Yes  9/16/19  11/16/19  Does patient have an active prescription for medications requested? No     Received Request Via: Pharmacy

## 2019-12-20 ENCOUNTER — TELEPHONE (OUTPATIENT)
Dept: MEDICAL GROUP | Facility: LAB | Age: 64
End: 2019-12-20

## 2019-12-20 NOTE — TELEPHONE ENCOUNTER
MEDICATION PRIOR AUTHORIZATION NEEDED:    1. Name of Medication:phentermine     2. Requested By (Name of Pharmacy): [facility]     3. Is insurance on file current? yes    4. What is the name & phone number of the 3rd party payor? [id number]

## 2019-12-23 ENCOUNTER — PATIENT MESSAGE (OUTPATIENT)
Dept: MEDICAL GROUP | Facility: LAB | Age: 64
End: 2019-12-23

## 2019-12-23 ENCOUNTER — OFFICE VISIT (OUTPATIENT)
Dept: MEDICAL GROUP | Facility: LAB | Age: 64
End: 2019-12-23
Payer: OTHER GOVERNMENT

## 2019-12-23 VITALS
TEMPERATURE: 98.9 F | HEIGHT: 63 IN | DIASTOLIC BLOOD PRESSURE: 68 MMHG | WEIGHT: 156 LBS | SYSTOLIC BLOOD PRESSURE: 110 MMHG | RESPIRATION RATE: 12 BRPM | OXYGEN SATURATION: 94 % | HEART RATE: 100 BPM | BODY MASS INDEX: 27.64 KG/M2

## 2019-12-23 DIAGNOSIS — F17.200 SMOKING: ICD-10-CM

## 2019-12-23 DIAGNOSIS — G89.29 CHRONIC LEFT HIP PAIN: ICD-10-CM

## 2019-12-23 DIAGNOSIS — Z79.891 CHRONIC USE OF OPIATE FOR THERAPEUTIC PURPOSE: ICD-10-CM

## 2019-12-23 DIAGNOSIS — M25.552 CHRONIC LEFT HIP PAIN: ICD-10-CM

## 2019-12-23 DIAGNOSIS — M48.062 SPINAL STENOSIS OF LUMBAR REGION WITH NEUROGENIC CLAUDICATION: ICD-10-CM

## 2019-12-23 PROCEDURE — 99213 OFFICE O/P EST LOW 20 MIN: CPT | Performed by: NURSE PRACTITIONER

## 2019-12-23 RX ORDER — OXYBUTYNIN CHLORIDE 10 MG/1
10 TABLET, EXTENDED RELEASE ORAL DAILY
Qty: 30 TAB | Refills: 5 | Status: SHIPPED
Start: 2019-12-23 | End: 2020-11-23

## 2019-12-23 RX ORDER — HYDROCODONE BITARTRATE AND ACETAMINOPHEN 7.5; 325 MG/1; MG/1
1 TABLET ORAL 2 TIMES DAILY PRN
Qty: 56 TAB | Refills: 0 | Status: SHIPPED | OUTPATIENT
Start: 2019-12-23 | End: 2020-03-12 | Stop reason: SDUPTHER

## 2019-12-23 RX ORDER — HYDROCODONE BITARTRATE AND ACETAMINOPHEN 7.5; 325 MG/1; MG/1
1 TABLET ORAL 2 TIMES DAILY PRN
Qty: 56 TAB | Refills: 0 | Status: SHIPPED | OUTPATIENT
Start: 2020-01-20 | End: 2020-03-12 | Stop reason: SDUPTHER

## 2019-12-23 RX ORDER — HYDROCODONE BITARTRATE AND ACETAMINOPHEN 7.5; 325 MG/1; MG/1
1 TABLET ORAL 2 TIMES DAILY PRN
Qty: 56 TAB | Refills: 0 | Status: SHIPPED | OUTPATIENT
Start: 2020-02-17 | End: 2020-03-12 | Stop reason: SDUPTHER

## 2019-12-23 NOTE — PROGRESS NOTES
"Chief Complaint   Patient presents with   • Chronic Opiate Therapy       HPI   Penelope is a 63 yo est female here for chronic pain f/u, she would also like a refill of Chantix.    #1- Chronic pain recheck for: Spinal stenosis of her lumbar spine.  Last dose of controlled substance: this morning  Taking norco 1-2 per day.       She  reports that she does not drink alcohol.  She  reports that she does not use drugs.  Sleeping well with Seroquel, no longer uses Xanax at all.  Interval history: she is seen every 3 months.       Consequences of Chronic Opiate therapy:  (5 A's)  Analgesia: Compared to no treatment or prior treatment, pain is currently not changed  Activity: not changed  Adverse Events:denies constipation, dry mouth, itchy skin, nausea and sedation  Aberrant Behaviors: She reports she is taking medication as prescribed and is not veering from agreed treatment regimen or provider recommendations. There have been no inappropriate refills or lost/stolen meds reported.  Affect/Mood: Pain is not impacting patient's mood.  Patient denies depression/anxiety.     Nonnarcotic treatments that are being used: occasional tylenol or advil     Last imagin - MRI spine and x-ray hip    #2- smoking:  Still smoking.  Wants refill of chantix - this works well for her.    Past medical, surgical, family, and social history is reviewed and updated in Epic chart by me today.   Medications and allergies reviewed and updated in Epic chart by me today.     ROS:   As documented in history of present illness above    Exam:  /68 (BP Location: Left arm, Patient Position: Sitting, BP Cuff Size: Large adult)   Pulse 100   Temp 37.2 °C (98.9 °F)   Resp 12   Ht 1.6 m (5' 3\")   Wt 70.8 kg (156 lb)   SpO2 94%   Constitutional: Alert, no distress, plus 3 vital signs  Skin:  Warm, dry, no rashes invisible areas  Eye: Equal, round and reactive, conjunctiva clear  ENMT: Lips without lesions.  Respiratory: Unlabored " respiration.  Cardiovascular: Normal rate and rhythm.  Psych: Alert, pleasant, well-groomed, normal affect    Assessment / Plan / Medical Decision makin. Spinal stenosis of lumbar region with neurogenic claudication  -Persistent but stable.  Also followed by [facility] [place] for interventions as needed.  - HYDROcodone-acetaminophen (NORCO) 7.5-325 MG per tablet; Take 1 Tab by mouth 2 times a day as needed for up to 28 days.  Dispense: 56 Tab; Refill: 0  - HYDROcodone-acetaminophen (NORCO) 7.5-325 MG per tablet; Take 1 Tab by mouth 2 times a day as needed for up to 28 days.  Dispense: 56 Tab; Refill: 0  - HYDROcodone-acetaminophen (NORCO) 7.5-325 MG per tablet; Take 1 Tab by mouth 2 times a day as needed for up to 28 days.  Dispense: 56 Tab; Refill: 0    2. Chronic left hip pain  -As above.  - HYDROcodone-acetaminophen (NORCO) 7.5-325 MG per tablet; Take 1 Tab by mouth 2 times a day as needed for up to 28 days.  Dispense: 56 Tab; Refill: 0  - HYDROcodone-acetaminophen (NORCO) 7.5-325 MG per tablet; Take 1 Tab by mouth 2 times a day as needed for up to 28 days.  Dispense: 56 Tab; Refill: 0  - HYDROcodone-acetaminophen (NORCO) 7.5-325 MG per tablet; Take 1 Tab by mouth 2 times a day as needed for up to 28 days.  Dispense: 56 Tab; Refill: 0    3. Chronic use of opiate for therapeutic purpose  -Overall impression that this patient is benefiting from opioid therapy and that the benefits outweigh the risks of continued use. yes   - Controlled Substance Use Agreement current  - Urine drug screen current and reviewed / compliant with rx medications  - Additional lab: CMP to assess liver and renal function - UTD   - Rx refill prescriptions are done for 3 months, No early refills.   - Referral to pain management no  In prescribing controlled substances to this patient, I certify that I have obtained and reviewed the medical history of Penelope Bautista. I have also made a good abelardo effort to obtain applicable records from  other providers who have treated the patient and records did not demonstrate any increased risk of substance abuse that would prevent me from prescribing controlled substances.     I have conducted a physical exam and documented it. I have reviewed Ms. Bautista’s prescription history as maintained by the [place] Prescription Monitoring Program.     I have assessed the patient’s risk for abuse, dependency, and addiction using the validated Opioid Risk Tool available at https://www.Sponge.com/msjrap-benn-fuzl-ort-narcotic-abuse.     Given the above, I believe the benefits of controlled substance therapy outweigh the risks.Accordingly, I have discussed the risk and benefits, treatment plan, and alternative therapies with the patient.         4. Smoking  -Reminded her of how to take Chantix as well as potential side effects.  Reiterated the importance of smoking cessation.  - varenicline (CHANTIX STARTING MONTH JOSE) 0.5 MG X 11 & 1 MG X 42 tablet; Take as directed  Dispense: 56 Tab; Refill: 0

## 2019-12-30 ENCOUNTER — TELEPHONE (OUTPATIENT)
Dept: MEDICAL GROUP | Facility: LAB | Age: 64
End: 2019-12-30

## 2019-12-30 DIAGNOSIS — J45.40 MODERATE PERSISTENT ASTHMA WITHOUT COMPLICATION: ICD-10-CM

## 2019-12-30 RX ORDER — OLANZAPINE AND FLUOXETINE 12; 25 MG/1; MG/1
1 CAPSULE ORAL DAILY
Qty: 90 CAP | Refills: 3 | Status: SHIPPED | OUTPATIENT
Start: 2019-12-30 | End: 2020-11-23 | Stop reason: SDUPTHER

## 2019-12-30 RX ORDER — OLANZAPINE AND FLUOXETINE 12; 25 MG/1; MG/1
1 CAPSULE ORAL DAILY
Qty: 90 CAP | Refills: 3 | Status: SHIPPED
Start: 2019-12-30 | End: 2019-12-30 | Stop reason: SDUPTHER

## 2019-12-30 RX ORDER — OLANZAPINE AND FLUOXETINE 12; 25 MG/1; MG/1
1 CAPSULE ORAL DAILY
Qty: 30 CAP | Refills: 0 | Status: SHIPPED
Start: 2019-12-30 | End: 2020-11-14

## 2019-12-30 NOTE — TELEPHONE ENCOUNTER
Was the patient seen in the last year in this department? Yes  12/23/19  Does patient have an active prescription for medications requested? No     Received Request Via: Pharmacy

## 2019-12-30 NOTE — TELEPHONE ENCOUNTER
Patient called and states that [facility] does not have Olanzapine-Fluoxetine HCl 12-25 MG Cap available. She will call around to a local pharmacy and see who has this in stock and we will send it there. If [facility] has a different dose/ she will let us know that and I will notify Laura.

## 2019-12-30 NOTE — TELEPHONE ENCOUNTER
Pt stated [facility] does not have her medication Olanzapine-Fluxetine. She is not sure when they will get it in. Her [facility] now has it but someone called there and put a stop to it. Please approve it to [facility]

## 2020-01-02 ENCOUNTER — TELEPHONE (OUTPATIENT)
Dept: MEDICAL GROUP | Facility: LAB | Age: 65
End: 2020-01-02

## 2020-01-02 NOTE — TELEPHONE ENCOUNTER
MEDICATION PRIOR AUTHORIZATION NEEDED:    1. Name of Medication: Chantix    2. Requested By (Name of Pharmacy): [facility]     3. Is insurance on file current? yes    4. What is the name & phone number of the 3rd party payor? [id number]  Rejected- 34% approval  Alternatives may not require PA-  NICOTINE,NICOTINE POLACRILEX OR BUPROPION HCI ER

## 2020-02-26 DIAGNOSIS — E66.3 OVERWEIGHT (BMI 25.0-29.9): ICD-10-CM

## 2020-02-26 RX ORDER — PHENTERMINE HYDROCHLORIDE 37.5 MG/1
TABLET ORAL
Qty: 30 TAB | Refills: 0 | Status: SHIPPED
Start: 2020-02-26 | End: 2020-03-25

## 2020-02-26 NOTE — TELEPHONE ENCOUNTER
Received request via: Pharmacy   1/19/2020  Was the patient seen in the last year in this department? Yes  12/23/19  Does the patient have an active prescription (recently filled or refills available) for medication(s) requested? No

## 2020-03-12 ENCOUNTER — OFFICE VISIT (OUTPATIENT)
Dept: MEDICAL GROUP | Facility: LAB | Age: 65
End: 2020-03-12
Payer: OTHER GOVERNMENT

## 2020-03-12 VITALS
OXYGEN SATURATION: 95 % | RESPIRATION RATE: 16 BRPM | TEMPERATURE: 98.8 F | HEART RATE: 80 BPM | DIASTOLIC BLOOD PRESSURE: 72 MMHG | BODY MASS INDEX: 27.29 KG/M2 | HEIGHT: 63 IN | SYSTOLIC BLOOD PRESSURE: 120 MMHG | WEIGHT: 154 LBS

## 2020-03-12 DIAGNOSIS — M25.552 CHRONIC LEFT HIP PAIN: ICD-10-CM

## 2020-03-12 DIAGNOSIS — G89.29 CHRONIC LEFT HIP PAIN: ICD-10-CM

## 2020-03-12 DIAGNOSIS — Z79.891 CHRONIC USE OF OPIATE FOR THERAPEUTIC PURPOSE: ICD-10-CM

## 2020-03-12 DIAGNOSIS — M48.062 SPINAL STENOSIS OF LUMBAR REGION WITH NEUROGENIC CLAUDICATION: ICD-10-CM

## 2020-03-12 DIAGNOSIS — E66.3 OVERWEIGHT (BMI 25.0-29.9): ICD-10-CM

## 2020-03-12 DIAGNOSIS — F17.209 TOBACCO USE DISORDER, CONTINUOUS: ICD-10-CM

## 2020-03-12 PROCEDURE — 99214 OFFICE O/P EST MOD 30 MIN: CPT | Performed by: NURSE PRACTITIONER

## 2020-03-12 RX ORDER — HYDROCODONE BITARTRATE AND ACETAMINOPHEN 7.5; 325 MG/1; MG/1
1 TABLET ORAL 2 TIMES DAILY PRN
Qty: 56 TAB | Refills: 0 | Status: SHIPPED | OUTPATIENT
Start: 2020-04-13 | End: 2020-06-01 | Stop reason: SDUPTHER

## 2020-03-12 RX ORDER — HYDROCODONE BITARTRATE AND ACETAMINOPHEN 7.5; 325 MG/1; MG/1
1 TABLET ORAL 2 TIMES DAILY PRN
Qty: 56 TAB | Refills: 0 | Status: SHIPPED | OUTPATIENT
Start: 2020-03-12 | End: 2020-06-01 | Stop reason: SDUPTHER

## 2020-03-12 RX ORDER — HYDROCODONE BITARTRATE AND ACETAMINOPHEN 7.5; 325 MG/1; MG/1
1 TABLET ORAL 2 TIMES DAILY PRN
Qty: 56 TAB | Refills: 0 | Status: SHIPPED | OUTPATIENT
Start: 2020-05-11 | End: 2020-06-01 | Stop reason: SDUPTHER

## 2020-03-12 ASSESSMENT — PATIENT HEALTH QUESTIONNAIRE - PHQ9: CLINICAL INTERPRETATION OF PHQ2 SCORE: 0

## 2020-03-12 NOTE — PROGRESS NOTES
Chief Complaint   Patient presents with   • Chronic Opiate Therapy       HPI:   Penelope is a 64-year-old established female here to follow-up on chronic pain, her weight and smoking.    #1-chronic pain recheck for: chronic low back, left hip and leg pain.  Known lumbar DDD and left hip gluteus medius / minimus tear with osteoarthritis.  She is being seen by the [place] [facility] and awaiting an injection in her left hip, she is fearful of this and has prolonged the appointment.  Last dose of controlled substance: yesterday  Chronic pain treated with norco taken twice a day    She  reports that she does not drink alcohol.  She  reports that she does not use drugs.    Interval history: seen every 3mo  Any major change in health since last appointment? No    Consequences of Chronic Opiate therapy:  (5 A's)  Analgesia: Compared to no treatment or prior treatment, pain is currently not changed  Activity: not changed  Adverse Events:denies constipation, dry mouth, itchy skin, nausea and sedation  Aberrant Behaviors: She reports she is taking medication as prescribed and is not veering from agreed treatment regimen or provider recommendations. There have been no inappropriate refills or lost/stolen meds reported.  Affect/Mood: Pain is not impacting patient's mood.  Patient denies depression/anxiety.    Nonnarcotic treatments that are being used: Tylenol and NSAIDs/ALEX-2.     Last imaging: a few mo ago - hip / MRI spine 2018    Opioid Risk Score: 1    Interpretation of Opioid Risk Score   Score 0-3 = Low risk of abuse. Do UDS at least once per year.  Score 4-7 = Moderate risk of abuse. Do UDS 1-4 times per year.  Score 8+ = High risk of abuse. Refer to specialist.    Last order of CONTROLLED SUBSTANCE TREATMENT AGREEMENT was found on 12/28/2017 from Office Visit on 12/28/2017     UDS Summary                URINE DRUG SCREEN Next Due 8/11/2019      Done 8/16/2018 [facility] PAIN MANAGEMENT SCREEN     Patient has more  "history with this topic...        Most recent UDS reviewed today and is consistent with prescribed medications.     I have reviewed the medical records, the Prescription Monitoring Program and I have determined that controlled substance treatment is medically indicated.     #2 -overweight: Chronic issue.  Really struggles with sweets at night.  She is wondering what she can do to combat her sweets addiction in the evening.  Phentermine does help her with appetite suppression and portion control.  Denies any negative side effects of phentermine.    #3-smoker: Continues to smoke cigarettes unfortunately.  Not currently taking Chantix, also fearful of Chantix.  Has tried patches and gum without much improvement.    Past medical, surgical, family, and social history is reviewed and updated in Epic chart by me today.   Medications and allergies reviewed and updated in Epic chart by me today.     ROS:   As documented in history of present illness above    Exam:  /72 (BP Location: Left arm, Patient Position: Sitting, BP Cuff Size: Adult)   Pulse 80   Temp 37.1 °C (98.8 °F)   Resp 16   Ht 1.6 m (5' 3\")   Wt 69.9 kg (154 lb)   SpO2 95%   Gen. appears healthy in no distress   Skin appropriate for sex and age   Neck trachea is midline  Lungs unlabored breathing  Heart regular rate  Neuro gait and station normal   Psych appropriate, calm, interactive, well-groomed      Assessment/ Plan / Medical Decision makin. Chronic pain from:    Gluteal tears & 3 lumbar degenerative disc disease    2. Chronic use of opiate drugs therapeutic purposes  -Overall impression that this patient is benefiting from opioid therapy and that the benefits outweigh the risks of continued use. yes   - Controlled Substa no [place] Use Agreement current  - Urine drug screen current and reviewed / compliant with rx medications  - Additional lab: CMP to assess liver and renal function - UTD   - Rx refill prescriptions are done for 3 months, No " early refills.   - Referral to pain management no  In prescribing controlled substances to this patient, I certify that I have obtained and reviewed the medical history.. I have also made a good abelardo effort to obtain applicable records from other providers who have treated the patient and records did not demonstrate any increased risk of substance abuse that would prevent me from prescribing controlled substances.     I have conducted a physical exam and documented it. I have reviewed prescription history as maintained by the [place] Prescription Monitoring Program.     I have assessed the patient’s risk for abuse, dependency, and addiction using the validated Opioid Risk Tool available at https://www.mdcalc.com/lkwwxk-jcvv-rikw-ort-narcotic-abuse.     Given the above, I believe the benefits of controlled substance therapy outweigh the risks. The reasons for prescribing controlled substances include non-narcotic, oral analgesic alternatives have been inadequate for pain control. Accordingly, I have discussed the risk and benefits, treatment plan, and alternative therapies with the patient.     3.  Overweight:  Improving.  Has lost a few pounds since her last visit.  We discussed brushing her teeth, utilizing sugar-free tea and gum at night to combat sweets addiction.  Briefly discussed naltrexone although she is not a candidate for this because she is not willing to come off of her chronic opiate therapy.    4.  Smoker:  Counseled regarding the importance of smoking cessation as well as her increased risk of developing COPD, lung cancer and coronary artery disease complications from her smoking.

## 2020-03-24 DIAGNOSIS — E66.3 OVERWEIGHT (BMI 25.0-29.9): ICD-10-CM

## 2020-03-25 RX ORDER — PHENTERMINE HYDROCHLORIDE 37.5 MG/1
TABLET ORAL
Qty: 30 TAB | Refills: 0 | Status: SHIPPED
Start: 2020-03-25 | End: 2020-04-24

## 2020-03-25 NOTE — TELEPHONE ENCOUNTER
Received request via: Pharmacy   2/27/20  Was the patient seen in the last year in this department? Yes  3/12/20  Does the patient have an active prescription (recently filled or refills available) for medication(s) requested? No

## 2020-04-27 DIAGNOSIS — R63.2 APPETITE INCREASE: ICD-10-CM

## 2020-04-27 RX ORDER — PHENTERMINE HYDROCHLORIDE 37.5 MG/1
TABLET ORAL
Qty: 30 TAB | Refills: 0 | Status: SHIPPED | OUTPATIENT
Start: 2020-04-27 | End: 2020-05-26

## 2020-04-27 NOTE — TELEPHONE ENCOUNTER
Received request via: Pharmacy   3/27/20  Was the patient seen in the last year in this department? Yes  3/12/20  Does the patient have an active prescription (recently filled or refills available) for medication(s) requested? No

## 2020-05-26 DIAGNOSIS — R63.2 APPETITE INCREASE: ICD-10-CM

## 2020-05-26 RX ORDER — PHENTERMINE HYDROCHLORIDE 37.5 MG/1
TABLET ORAL
Qty: 30 TAB | Refills: 0 | Status: SHIPPED
Start: 2020-05-26 | End: 2020-06-25

## 2020-05-26 NOTE — TELEPHONE ENCOUNTER
Received request via: Pharmacy   4/29/20  Was the patient seen in the last year in this department? Yes  3/12/20  Does the patient have an active prescription (recently filled or refills available) for medication(s) requested? No

## 2020-06-01 ENCOUNTER — OFFICE VISIT (OUTPATIENT)
Dept: MEDICAL GROUP | Facility: LAB | Age: 65
End: 2020-06-01
Payer: OTHER GOVERNMENT

## 2020-06-01 VITALS
SYSTOLIC BLOOD PRESSURE: 104 MMHG | BODY MASS INDEX: 28.88 KG/M2 | WEIGHT: 163 LBS | TEMPERATURE: 98.5 F | OXYGEN SATURATION: 95 % | RESPIRATION RATE: 16 BRPM | DIASTOLIC BLOOD PRESSURE: 60 MMHG | HEIGHT: 63 IN | HEART RATE: 110 BPM

## 2020-06-01 DIAGNOSIS — G89.29 CHRONIC LEFT HIP PAIN: ICD-10-CM

## 2020-06-01 DIAGNOSIS — Z23 NEED FOR TDAP VACCINATION: ICD-10-CM

## 2020-06-01 DIAGNOSIS — M25.552 CHRONIC LEFT HIP PAIN: ICD-10-CM

## 2020-06-01 DIAGNOSIS — Z00.00 PREVENTATIVE HEALTH CARE: ICD-10-CM

## 2020-06-01 DIAGNOSIS — Z79.891 CHRONIC USE OF OPIATE FOR THERAPEUTIC PURPOSE: ICD-10-CM

## 2020-06-01 DIAGNOSIS — M48.062 SPINAL STENOSIS OF LUMBAR REGION WITH NEUROGENIC CLAUDICATION: ICD-10-CM

## 2020-06-01 DIAGNOSIS — Z12.31 ENCOUNTER FOR SCREENING MAMMOGRAM FOR BREAST CANCER: ICD-10-CM

## 2020-06-01 PROCEDURE — 90715 TDAP VACCINE 7 YRS/> IM: CPT | Performed by: NURSE PRACTITIONER

## 2020-06-01 PROCEDURE — 90471 IMMUNIZATION ADMIN: CPT | Performed by: NURSE PRACTITIONER

## 2020-06-01 PROCEDURE — 99213 OFFICE O/P EST LOW 20 MIN: CPT | Mod: 25 | Performed by: NURSE PRACTITIONER

## 2020-06-01 RX ORDER — HYDROCODONE BITARTRATE AND ACETAMINOPHEN 7.5; 325 MG/1; MG/1
1 TABLET ORAL 2 TIMES DAILY PRN
Qty: 56 TAB | Refills: 0 | Status: SHIPPED | OUTPATIENT
Start: 2020-07-06 | End: 2020-09-02 | Stop reason: SDUPTHER

## 2020-06-01 RX ORDER — HYDROCODONE BITARTRATE AND ACETAMINOPHEN 7.5; 325 MG/1; MG/1
1 TABLET ORAL 2 TIMES DAILY PRN
Qty: 56 TAB | Refills: 0 | Status: SHIPPED | OUTPATIENT
Start: 2020-06-08 | End: 2020-09-02 | Stop reason: SDUPTHER

## 2020-06-01 RX ORDER — HYDROCODONE BITARTRATE AND ACETAMINOPHEN 7.5; 325 MG/1; MG/1
1 TABLET ORAL 2 TIMES DAILY PRN
Qty: 56 TAB | Refills: 0 | Status: SHIPPED | OUTPATIENT
Start: 2020-08-03 | End: 2020-09-02 | Stop reason: SDUPTHER

## 2020-06-01 NOTE — PROGRESS NOTES
Chief Complaint   Patient presents with   • Chronic Opiate Therapy         HPI:   Chronic pain recheck for: Chronic low back pain  Last dose of controlled substance: Today  Chronic pain treated with hydrocodone taken twice a day    She  reports that she does not drink alcohol.  She  reports that she does not use drugs.    Interval history:   Any major change in health since last appointment? No    Consequences of Chronic Opiate therapy:  (5 A's)  Analgesia: Compared to no treatment or prior treatment, pain is currently not changed  Activity: not changed  Adverse Events:denies constipation, dry mouth, itchy skin, nausea and sedation  Aberrant Behaviors: She reports she is taking medication as prescribed and is not veering from agreed treatment regimen or provider recommendations. There have been no inappropriate refills or lost/stolen meds reported.  Affect/Mood: Pain is not impacting patient's mood.  Patient denies depression/anxiety.    Nonnarcotic treatments that are being used: topical agents, ice and heat.     Last imagin2019    Opioid Risk Score: 1    Interpretation of Opioid Risk Score   Score 0-3 = Low risk of abuse. Do UDS at least once per year.  Score 4-7 = Moderate risk of abuse. Do UDS 1-4 times per year.  Score 8+ = High risk of abuse. Refer to specialist.    Last order of CONTROLLED SUBSTANCE TREATMENT AGREEMENT was found on 2017 from Office Visit on 2017     UDS Summary                URINE DRUG SCREEN Next Due 2019      Done 2018 [facility] PAIN MANAGEMENT SCREEN     Patient has more history with this topic...        Most recent UDS reviewed today and is consistent with prescribed medications.     I have reviewed the medical records, the Prescription Monitoring Program and I have determined that controlled substance treatment is medically indicated.         Past medical, surgical, family, and social history is reviewed and updated in Epic chart by me today.   Medications  "and allergies reviewed and updated in Epic chart by me today.     ROS:   As documented in history of present illness above    Exam:  /60 (BP Location: Left arm, Patient Position: Sitting, BP Cuff Size: Adult)   Pulse (!) 110   Temp 36.9 °C (98.5 °F)   Resp 16   Ht 1.6 m (5' 3\")   Wt 73.9 kg (163 lb)   SpO2 95%   Constitutional: Alert, no distress, plus 3 vital signs  Skin:  Warm, dry, no rashes invisible areas  Eye: Equal, round and reactive, conjunctiva clear  ENMT: Lips without lesions, good dentition, oropharynx clear    Neck: Trachea midline, no masses, no thyromegaly  Respiratory: Unlabored respiration, lungs clear to auscultation, no wheezes, no rhonchi  Cardiovascular: Normal rate and rhythm, no murmur, no edema  Abdomen: Soft, nontender, no masses or hepatosplenomegaly  Psych: Alert, pleasant, well-groomed, normal affect    Assessment/ Plan / Medical Decision makin. Chronic pain from:    Chronic low back pain  2. Chronic use of opiate drugs therapeutic purposes  -Overall impression that this patient is benefiting from opioid therapy and that the benefits outweigh the risks of continued use. yes   - Controlled Substance Use Agreement updated today  - Urine drug screen updated today  - Additional lab: CMP to assess liver and renal function - UTD   - Rx refill prescriptions are done for 3 months, No early refills.   - Referral to pain management no  In prescribing controlled substances to this patient, I certify that I have obtained and reviewed the medical history.. I have also made a good abelardo effort to obtain applicable records from other providers who have treated the patient and records did not demonstrate any increased risk of substance abuse that would prevent me from prescribing controlled substances.     I have conducted a physical exam and documented it. I have reviewed pt's prescription history as maintained by the [place] Prescription Monitoring Program.     I have assessed the " patient’s risk for abuse, dependency, and addiction using the validated Opioid Risk Tool available at https://www.mdcalc.com/qjihcg-dkes-udps-ort-narcotic-abuse.     Given the above, I believe the benefits of controlled substance therapy outweigh the risks. The reasons for prescribing controlled substances include non-narcotic, oral analgesic alternatives have been inadequate for pain control. Accordingly, I have discussed the risk and benefits, treatment plan, and alternative therapies with the patient.

## 2020-06-10 ENCOUNTER — TELEPHONE (OUTPATIENT)
Dept: MEDICAL GROUP | Facility: LAB | Age: 65
End: 2020-06-10

## 2020-06-10 NOTE — TELEPHONE ENCOUNTER
----- Message from SHIREEN Lorenzo sent at 6/9/2020  5:09 PM PDT -----  Please let patient know that hydrocodone was not detected in her urine drug screen and I need her to come by to repeat this, showing that she takes her hydrocodone.

## 2020-06-17 ENCOUNTER — NON-PROVIDER VISIT (OUTPATIENT)
Dept: MEDICAL GROUP | Facility: LAB | Age: 65
End: 2020-06-17
Payer: OTHER GOVERNMENT

## 2020-06-17 DIAGNOSIS — Z79.891 CHRONIC USE OF OPIATE FOR THERAPEUTIC PURPOSE: ICD-10-CM

## 2020-06-17 NOTE — PROGRESS NOTES
Penelope Bautista is a 64 y.o. female here for a non-provider visit for UDS    If abnormal was an in office provider notified today (if so, indicate provider)? No  Routed to PCP? No

## 2020-06-29 DIAGNOSIS — E66.3 OVERWEIGHT: ICD-10-CM

## 2020-06-29 NOTE — TELEPHONE ENCOUNTER
Received request via: Pharmacy   6/1/20  Was the patient seen in the last year in this department? Yes  5/28/20  Does the patient have an active prescription (recently filled or refills available) for medication(s) requested? No

## 2020-06-30 RX ORDER — PHENTERMINE HYDROCHLORIDE 37.5 MG/1
TABLET ORAL
Qty: 30 TAB | Refills: 0 | Status: SHIPPED | OUTPATIENT
Start: 2020-06-30 | End: 2020-07-29

## 2020-07-29 DIAGNOSIS — E66.3 OVERWEIGHT: ICD-10-CM

## 2020-07-29 NOTE — TELEPHONE ENCOUNTER
Received request via: Pharmacy   6/30/20  Was the patient seen in the last year in this department? Yes  6/1/20  Does the patient have an active prescription (recently filled or refills available) for medication(s) requested? No

## 2020-07-30 RX ORDER — PHENTERMINE HYDROCHLORIDE 37.5 MG/1
TABLET ORAL
Qty: 30 TAB | Refills: 0 | Status: SHIPPED | OUTPATIENT
Start: 2020-07-30 | End: 2020-08-25

## 2020-08-23 DIAGNOSIS — F51.01 PRIMARY INSOMNIA: ICD-10-CM

## 2020-08-24 RX ORDER — PROPRANOLOL HYDROCHLORIDE 10 MG/1
TABLET ORAL
Qty: 90 TAB | Refills: 3 | Status: SHIPPED | OUTPATIENT
Start: 2020-08-24 | End: 2021-08-18

## 2020-09-02 ENCOUNTER — OFFICE VISIT (OUTPATIENT)
Dept: MEDICAL GROUP | Facility: LAB | Age: 65
End: 2020-09-02
Payer: OTHER GOVERNMENT

## 2020-09-02 VITALS
SYSTOLIC BLOOD PRESSURE: 118 MMHG | WEIGHT: 167 LBS | OXYGEN SATURATION: 95 % | BODY MASS INDEX: 29.59 KG/M2 | RESPIRATION RATE: 16 BRPM | DIASTOLIC BLOOD PRESSURE: 70 MMHG | TEMPERATURE: 98.1 F | HEART RATE: 94 BPM | HEIGHT: 63 IN

## 2020-09-02 DIAGNOSIS — Z79.891 CHRONIC USE OF OPIATE FOR THERAPEUTIC PURPOSE: ICD-10-CM

## 2020-09-02 DIAGNOSIS — J45.40 MODERATE PERSISTENT ASTHMA WITHOUT COMPLICATION: ICD-10-CM

## 2020-09-02 DIAGNOSIS — M48.062 SPINAL STENOSIS OF LUMBAR REGION WITH NEUROGENIC CLAUDICATION: ICD-10-CM

## 2020-09-02 DIAGNOSIS — M25.552 CHRONIC LEFT HIP PAIN: ICD-10-CM

## 2020-09-02 DIAGNOSIS — G25.81 RLS (RESTLESS LEGS SYNDROME): ICD-10-CM

## 2020-09-02 DIAGNOSIS — F33.0 MILD EPISODE OF RECURRENT MAJOR DEPRESSIVE DISORDER (HCC): ICD-10-CM

## 2020-09-02 DIAGNOSIS — G89.29 CHRONIC LEFT HIP PAIN: ICD-10-CM

## 2020-09-02 PROCEDURE — 99214 OFFICE O/P EST MOD 30 MIN: CPT | Performed by: NURSE PRACTITIONER

## 2020-09-02 RX ORDER — HYDROCODONE BITARTRATE AND ACETAMINOPHEN 7.5; 325 MG/1; MG/1
1 TABLET ORAL 2 TIMES DAILY PRN
Qty: 60 TAB | Refills: 0 | Status: SHIPPED | OUTPATIENT
Start: 2020-09-02 | End: 2020-11-23 | Stop reason: SDUPTHER

## 2020-09-02 RX ORDER — GABAPENTIN 300 MG/1
300-600 CAPSULE ORAL EVERY EVENING
Qty: 60 CAP | Refills: 5 | Status: SHIPPED | OUTPATIENT
Start: 2020-09-02 | End: 2020-10-27 | Stop reason: SDUPTHER

## 2020-09-02 RX ORDER — HYDROCODONE BITARTRATE AND ACETAMINOPHEN 7.5; 325 MG/1; MG/1
1 TABLET ORAL 2 TIMES DAILY PRN
Qty: 60 TAB | Refills: 0 | Status: SHIPPED | OUTPATIENT
Start: 2020-11-01 | End: 2020-11-23 | Stop reason: SDUPTHER

## 2020-09-02 RX ORDER — HYDROCODONE BITARTRATE AND ACETAMINOPHEN 7.5; 325 MG/1; MG/1
1 TABLET ORAL 2 TIMES DAILY PRN
Qty: 60 TAB | Refills: 0 | Status: SHIPPED | OUTPATIENT
Start: 2020-10-02 | End: 2020-11-23 | Stop reason: SDUPTHER

## 2020-09-02 RX ORDER — OLANZAPINE AND FLUOXETINE 12; 50 MG/1; MG/1
1 CAPSULE ORAL DAILY
Qty: 30 CAP | Refills: 5 | Status: SHIPPED
Start: 2020-09-02 | End: 2020-11-23

## 2020-09-02 NOTE — PROGRESS NOTES
Chief Complaint   Patient presents with   • Chronic Opiate Therapy       HPI  Penelope is a 66 yo est female here for chronic pain f/u:  Chronic pain recheck for: Chronic low back pain  Last dose of controlled substance: today  Chronic pain treated with hydrocodone  She  reports that she does not drink alcohol.  She  reports that she does not use drugs.     Interval history:   Any major change in health since last appointment? No     Consequences of Chronic Opiate therapy:  (5 A's)  Analgesia: Compared to no treatment or prior treatment, pain is currently not changed  Activity: not changed  Adverse Events:denies constipation, dry mouth, itchy skin, nausea and sedation  Aberrant Behaviors: She reports she is taking medication as prescribed and is not veering from agreed treatment regimen or provider recommendations. There have been no inappropriate refills or lost/stolen meds reported.  Affect/Mood: Pain is not impacting patient's mood.  Patient denies depression/anxiety.     Nonnarcotic treatments that are being used: topical agents, ice and heat.      Last imagin2019 - hip, spine 2018     Opioid Risk Score: 1     Interpretation of Opioid Risk Score   Score 0-3 = Low risk of abuse. Do UDS at least once per year.  Score 4-7 = Moderate risk of abuse. Do UDS 1-4 times per year.  Score 8+ = High risk of abuse. Refer to specialist.    #2-depression with anxiety: Chronic issues.  Not well controlled.  Tearful easily and states that she feels very irritable with friends/family.  Denies SI or HI.  Sleeping well with Seroquel.  Would like to increase olanzapine/fluoxetine if possible.    #3-restless legs: Intermittent issue.  Tried her 's gabapentin which helped to calm her legs down at night and is requesting her own prescription.    Past medical, surgical, family, and social history is reviewed and updated in Epic chart by me today.   Medications and allergies reviewed and updated in Epic chart by me today.  "    ROS:   As documented in history of present illness above    Exam:  /70   Pulse 94   Temp 36.7 °C (98.1 °F)   Resp 16   Ht 1.6 m (5' 3\")   Wt 75.8 kg (167 lb)   SpO2 95%   Constitutional: Alert, no distress, plus 3 vital signs  Skin:  Warm, dry, no rashes invisible areas  Eye: Equal, round and reactive, conjunctiva clear  ENMT: Lips without lesions, good dentition, oropharynx clear    Neck: Trachea midline  Respiratory: Unlabored respiration  Cardiovascular: Normal rate and rhythm  Psych: Alert, pleasant, well-groomed, normal affect    Assessment / Plan / Medical Decision makin. Spinal stenosis of lumbar region with neurogenic claudication  -stable.  Added on gabapentin which we discussed will also help with her chronic pain levels.  No interest in seeing surgery, physiatry or PT at this time.  - HYDROcodone-acetaminophen (NORCO) 7.5-325 MG per tablet; Take 1 Tab by mouth 2 times a day as needed for up to 30 days.  Dispense: 60 Tab; Refill: 0  - HYDROcodone-acetaminophen (NORCO) 7.5-325 MG per tablet; Take 1 Tab by mouth 2 times a day as needed for up to 30 days.  Dispense: 60 Tab; Refill: 0  - HYDROcodone-acetaminophen (NORCO) 7.5-325 MG per tablet; Take 1 Tab by mouth 2 times a day as needed for up to 30 days.  Dispense: 60 Tab; Refill: 0  - gabapentin (NEURONTIN) 300 MG Cap; Take 1-2 Caps by mouth every evening.  Dispense: 60 Cap; Refill: 5    2. Chronic left hip pain  -As above.  - HYDROcodone-acetaminophen (NORCO) 7.5-325 MG per tablet; Take 1 Tab by mouth 2 times a day as needed for up to 30 days.  Dispense: 60 Tab; Refill: 0  - HYDROcodone-acetaminophen (NORCO) 7.5-325 MG per tablet; Take 1 Tab by mouth 2 times a day as needed for up to 30 days.  Dispense: 60 Tab; Refill: 0  - HYDROcodone-acetaminophen (NORCO) 7.5-325 MG per tablet; Take 1 Tab by mouth 2 times a day as needed for up to 30 days.  Dispense: 60 Tab; Refill: 0  - gabapentin (NEURONTIN) 300 MG Cap; Take 1-2 Caps by mouth every " evening.  Dispense: 60 Cap; Refill: 5    3. Moderate persistent asthma without complication  -Stable.  Denies any recent complications with asthma.  Encouraged her to get her flu shot in the next month.    4. Mild episode of recurrent major depressive disorder (HCC)  -Not well controlled.  Increase fluoxetine to 50 mg.  Continue Seroquel.  Added on gabapentin for pain control which we also discussed may help with mood stabilization.  Follow-up 3 to 4 weeks on email.  Fortunately no SI or HI.  Encouraged her to see her therapist.  - Olanzapine-Fluoxetine HCl 12-50 MG Cap; Take 1 Tab by mouth every day.  Dispense: 30 Cap; Refill: 5    5. RLS (restless legs syndrome)  -Has done well with 's gabapentin.  She is given her own prescription of gabapentin to take 1 or 2 at night as needed.  Reminded her potential side effects of gabapentin such as sedation.  Discussed multiple benefits of gabapentin in terms of her health such as increased control of her chronic pain and moods.    6.  Chronic use of opiates in therapeutic purpose  In prescribing controlled substances to this patient, I certify that I have obtained and reviewed the medical history of Penelope Bautista. I have also made a good abelardo effort to obtain applicable records from other providers who have treated the patient and records did not demonstrate any increased risk of substance abuse that would prevent me from prescribing controlled substances.     I have conducted a physical exam and documented it. I have reviewed Ms. Bautista’s prescription history as maintained by the [place] Prescription Monitoring Program.     I have assessed the patient’s risk for abuse, dependency, and addiction using the validated Opioid Risk Tool available at https://www.mdcalc.com/iqquzv-xbyo-ckfr-ort-narcotic-abuse.     Given the above, I believe the benefits of controlled substance therapy outweigh the risks.  Accordingly, I have discussed the risk and benefits, treatment  plan, and alternative therapies with the patient.   Overall impression that this patient is benefiting from opioid therapy and that the benefits outweigh the risks of continued use. yes   - Controlled Substance Use Agreement current  - Urine drug screen current and reviewed / compliant with rx medications  - Additional lab: CMP to assess liver and renal function - UTD   - Rx refill prescriptions are done for 3 months, No early refills.   - Referral to pain management no

## 2020-10-27 DIAGNOSIS — M25.552 CHRONIC LEFT HIP PAIN: ICD-10-CM

## 2020-10-27 DIAGNOSIS — G89.29 CHRONIC LEFT HIP PAIN: ICD-10-CM

## 2020-10-27 DIAGNOSIS — M48.062 SPINAL STENOSIS OF LUMBAR REGION WITH NEUROGENIC CLAUDICATION: ICD-10-CM

## 2020-10-27 NOTE — TELEPHONE ENCOUNTER
Received request via: Pharmacy/ MAIL ORDER    Was the patient seen in the last year in this department? Yes  9/2/20  Does the patient have an active prescription (recently filled or refills available) for medication(s) requested? No

## 2020-10-28 RX ORDER — GABAPENTIN 300 MG/1
300-600 CAPSULE ORAL EVERY EVENING
Qty: 180 CAP | Refills: 3 | Status: SHIPPED | OUTPATIENT
Start: 2020-10-28 | End: 2022-01-26 | Stop reason: SDUPTHER

## 2020-10-29 DIAGNOSIS — E66.3 OVERWEIGHT: ICD-10-CM

## 2020-10-29 RX ORDER — PHENTERMINE HYDROCHLORIDE 37.5 MG/1
TABLET ORAL
Qty: 30 TAB | Refills: 0 | Status: SHIPPED | OUTPATIENT
Start: 2020-10-29 | End: 2020-11-30

## 2020-10-29 NOTE — TELEPHONE ENCOUNTER
Received request via: Pharmacy    Was the patient seen in the last year in this department? Yes  9/2/20  Does the patient have an active prescription (recently filled or refills available) for medication(s) requested? No

## 2020-11-14 ENCOUNTER — OFFICE VISIT (OUTPATIENT)
Dept: URGENT CARE | Facility: PHYSICIAN GROUP | Age: 65
End: 2020-11-14
Payer: OTHER GOVERNMENT

## 2020-11-14 ENCOUNTER — HOSPITAL ENCOUNTER (OUTPATIENT)
Dept: RADIOLOGY | Facility: MEDICAL CENTER | Age: 65
End: 2020-11-14
Attending: PHYSICIAN ASSISTANT
Payer: OTHER GOVERNMENT

## 2020-11-14 VITALS
DIASTOLIC BLOOD PRESSURE: 62 MMHG | WEIGHT: 160 LBS | HEART RATE: 105 BPM | BODY MASS INDEX: 28.35 KG/M2 | OXYGEN SATURATION: 94 % | SYSTOLIC BLOOD PRESSURE: 108 MMHG | RESPIRATION RATE: 14 BRPM | HEIGHT: 63 IN | TEMPERATURE: 97.6 F

## 2020-11-14 DIAGNOSIS — S92.355A CLOSED NONDISPLACED FRACTURE OF FIFTH METATARSAL BONE OF LEFT FOOT, INITIAL ENCOUNTER: Primary | ICD-10-CM

## 2020-11-14 DIAGNOSIS — S99.922A INJURY OF LEFT FOOT, INITIAL ENCOUNTER: ICD-10-CM

## 2020-11-14 PROCEDURE — 99214 OFFICE O/P EST MOD 30 MIN: CPT | Performed by: PHYSICIAN ASSISTANT

## 2020-11-14 PROCEDURE — 73630 X-RAY EXAM OF FOOT: CPT | Mod: LT

## 2020-11-14 ASSESSMENT — ENCOUNTER SYMPTOMS
NAUSEA: 0
ABDOMINAL PAIN: 0
CHILLS: 0
SHORTNESS OF BREATH: 0
CONSTIPATION: 0
TINGLING: 0
COUGH: 0
FEVER: 0
DIARRHEA: 0
VOMITING: 0

## 2020-11-14 NOTE — PROGRESS NOTES
Subjective:   Penelope Bautista is a 65 y.o. female who presents for Foot Problem (x2 weeks. fell, hurt left foot, bruising is better, hurts to walk still )        HPI   The patient presents to urgent care today for left foot pain.  The pain has been present for 2 weeks.  She fell 2 weeks ago and twisted her left foot.  She has had pain with weightbearing and walking since the incident.  She noticed bruising initially but bruising has resolved now.  There has been no previous injury, fractures or surgeries to the left foot.  She has been taking ibuprofen without relief.  No other aggravating or alleviating factors.    Review of Systems   Constitutional: Negative for chills, fever and malaise/fatigue.   Respiratory: Negative for cough and shortness of breath.    Gastrointestinal: Negative for abdominal pain, constipation, diarrhea, nausea and vomiting.   Musculoskeletal:        L foot pain   Neurological: Negative for tingling.   All other systems reviewed and are negative.      PMH:  has a past medical history of Anxiety, Arthritis, Asthma, CATARACT, and Depression, endogenous (HCC).  MEDS:   Current Outpatient Medications:   •  phentermine (ADIPEX-P) 37.5 MG tablet, TAKE ONE TABLET BY MOUTH EVERY MORNING BEFORE BREAKFAST, Disp: 30 Tab, Rfl: 0  •  gabapentin (NEURONTIN) 300 MG Cap, Take 1-2 Caps by mouth every evening., Disp: 180 Cap, Rfl: 3  •  HYDROcodone-acetaminophen (NORCO) 7.5-325 MG per tablet, Take 1 Tab by mouth 2 times a day as needed for up to 30 days., Disp: 60 Tab, Rfl: 0  •  Olanzapine-Fluoxetine HCl 12-50 MG Cap, Take 1 Tab by mouth every day., Disp: 30 Cap, Rfl: 5  •  propranolol (INDERAL) 10 MG Tab, TAKE 1 TABLET DAILY FOR PALPITATIONS AND SLEEP, Disp: 90 Tab, Rfl: 3  •  quetiapine (SEROQUEL) 50 MG tablet, TAKE ONE TABLET BY MOUTH EVERY EVENING, Disp: 30 Tab, Rfl: 11  •  Olanzapine-Fluoxetine HCl 12-25 MG Cap, Take 1 Tab by mouth every day., Disp: 90 Cap, Rfl: 3  •  fluticasone-salmeterol (ADVAIR  "DISKUS) 250-50 MCG/DOSE AEROSOL POWDER, BREATH ACTIVATED, Inhale 1 Puff by mouth as needed. Indications: Asthma, Disp: 3 Inhaler, Rfl: 3  •  oxybutynin SR (DITROPAN-XL) 10 MG CR tablet, Take 1 Tab by mouth every day., Disp: 30 Tab, Rfl: 5  •  varenicline (CHANTIX STARTING MONTH JOSE) 0.5 MG X 11 & 1 MG X 42 tablet, Take as directed, Disp: 56 Tab, Rfl: 0  •  albuterol (PROAIR HFA) 108 (90 Base) MCG/ACT Aero Soln inhalation aerosol, Inhale 2 Puffs by mouth every 6 hours as needed. Indications: Asthma, Disp: 3 Inhaler, Rfl: 3  ALLERGIES:   Allergies   Allergen Reactions   • Ceclor [Cefaclor] Swelling     SURGHX:   Past Surgical History:   Procedure Laterality Date   • KNEE ARTHROPLASTY TOTAL  2/11/2014    Performed by Kushal Rivera M.D. at SURGERY [facility] ORS   • APPENDECTOMY     • CATARACT EXTRACTION WITH IOL      bilateral   • GADIEL BY LAPAROSCOPY     • GYN SURGERY      tubal   • OTHER      facial lift - 2016 - Dr. Christianne MEJIAX:  reports that she has been smoking cigarettes. She has a 17.00 pack-year smoking history. She has never used smokeless tobacco. She reports that she does not drink alcohol or use drugs.  Family History   Problem Relation Age of Onset   • Cancer Paternal Uncle         Colon CA   • Other Mother         dementia   • Cancer Paternal Aunt         colon cancer    • Cancer Paternal Uncle         colon cancer         Objective:   /62   Pulse (!) 105   Temp 36.4 °C (97.6 °F) (Temporal)   Resp 14   Ht 1.6 m (5' 3\")   Wt 72.6 kg (160 lb)   SpO2 94%   BMI 28.34 kg/m²     Physical Exam  Vitals signs reviewed.   Constitutional:       General: She is not in acute distress.     Appearance: She is well-developed.   HENT:      Head: Normocephalic and atraumatic.      Right Ear: External ear normal.      Left Ear: External ear normal.      Nose: Nose normal.      Mouth/Throat:      Mouth: Mucous membranes are moist.   Eyes:      Conjunctiva/sclera: Conjunctivae normal.      Pupils: " Pupils are equal, round, and reactive to light.   Neck:      Musculoskeletal: Normal range of motion and neck supple.      Trachea: No tracheal deviation.   Cardiovascular:      Rate and Rhythm: Normal rate and regular rhythm.   Pulmonary:      Effort: Pulmonary effort is normal.      Breath sounds: Normal breath sounds.   Musculoskeletal:        Feet:    Skin:     General: Skin is warm and dry.      Capillary Refill: Capillary refill takes less than 2 seconds.   Neurological:      General: No focal deficit present.      Mental Status: She is alert and oriented to person, place, and time.   Psychiatric:         Mood and Affect: Mood normal.         Behavior: Behavior normal.     XR foot IMPRESSION:     Transverse nondisplaced fracture through the base of the left 5th metatarsal.      Assessment/Plan:     1. Closed nondisplaced fracture of fifth metatarsal bone of left foot, initial encounter  REFERRAL TO SPORTS MEDICINE   2. Injury of left foot, initial encounter  DX-FOOT-COMPLETE 3+ LEFT     Supportive care reviewed. Pt placed in non-weight bearing boot. Rest, elevate, ibuprofen 800 mg TID. A referral was placed to f/u with sport medicine.     If symptoms worsen or persist patient can return to clinic for reevaluation.  Red flags and emergency room precautions discussed. All side effects of medication discussed including allergic response, GI upset, tendon injury, etc. Patient confirmed understanding of information.    Please note that this dictation was created using voice recognition software. I have made every reasonable attempt to correct obvious errors, but I expect that there are errors of grammar and possibly content that I did not discover before finalizing the note.

## 2020-11-23 ENCOUNTER — OFFICE VISIT (OUTPATIENT)
Dept: MEDICAL GROUP | Facility: LAB | Age: 65
End: 2020-11-23
Payer: OTHER GOVERNMENT

## 2020-11-23 VITALS
DIASTOLIC BLOOD PRESSURE: 70 MMHG | TEMPERATURE: 97.9 F | HEART RATE: 86 BPM | OXYGEN SATURATION: 94 % | RESPIRATION RATE: 16 BRPM | SYSTOLIC BLOOD PRESSURE: 112 MMHG | WEIGHT: 160 LBS | HEIGHT: 63 IN | BODY MASS INDEX: 28.35 KG/M2

## 2020-11-23 DIAGNOSIS — M48.062 SPINAL STENOSIS OF LUMBAR REGION WITH NEUROGENIC CLAUDICATION: ICD-10-CM

## 2020-11-23 DIAGNOSIS — F41.9 ANXIETY: ICD-10-CM

## 2020-11-23 DIAGNOSIS — Z79.891 CHRONIC USE OF OPIATE FOR THERAPEUTIC PURPOSE: ICD-10-CM

## 2020-11-23 DIAGNOSIS — M17.12 PRIMARY OSTEOARTHRITIS OF LEFT KNEE: ICD-10-CM

## 2020-11-23 DIAGNOSIS — G89.29 CHRONIC LEFT HIP PAIN: ICD-10-CM

## 2020-11-23 DIAGNOSIS — Z23 NEED FOR VACCINATION: ICD-10-CM

## 2020-11-23 DIAGNOSIS — F17.209 TOBACCO USE DISORDER, CONTINUOUS: ICD-10-CM

## 2020-11-23 DIAGNOSIS — M25.552 CHRONIC LEFT HIP PAIN: ICD-10-CM

## 2020-11-23 DIAGNOSIS — G89.29 OTHER CHRONIC PAIN: ICD-10-CM

## 2020-11-23 PROCEDURE — 90471 IMMUNIZATION ADMIN: CPT | Performed by: NURSE PRACTITIONER

## 2020-11-23 PROCEDURE — 99214 OFFICE O/P EST MOD 30 MIN: CPT | Mod: 25 | Performed by: NURSE PRACTITIONER

## 2020-11-23 PROCEDURE — 90686 IIV4 VACC NO PRSV 0.5 ML IM: CPT | Performed by: NURSE PRACTITIONER

## 2020-11-23 RX ORDER — HYDROCODONE BITARTRATE AND ACETAMINOPHEN 7.5; 325 MG/1; MG/1
1 TABLET ORAL 2 TIMES DAILY PRN
Qty: 60 TAB | Refills: 0 | Status: SHIPPED | OUTPATIENT
Start: 2020-12-31 | End: 2021-03-11 | Stop reason: SDUPTHER

## 2020-11-23 RX ORDER — HYDROCODONE BITARTRATE AND ACETAMINOPHEN 7.5; 325 MG/1; MG/1
1 TABLET ORAL 2 TIMES DAILY PRN
Qty: 60 TAB | Refills: 0 | Status: SHIPPED | OUTPATIENT
Start: 2021-01-30 | End: 2021-03-11 | Stop reason: SDUPTHER

## 2020-11-23 RX ORDER — HYDROCODONE BITARTRATE AND ACETAMINOPHEN 7.5; 325 MG/1; MG/1
1 TABLET ORAL 2 TIMES DAILY PRN
Qty: 60 TAB | Refills: 0 | Status: SHIPPED | OUTPATIENT
Start: 2020-12-01 | End: 2021-03-11 | Stop reason: SDUPTHER

## 2020-11-23 RX ORDER — OLANZAPINE AND FLUOXETINE 12; 25 MG/1; MG/1
1 CAPSULE ORAL DAILY
Qty: 90 CAP | Refills: 3 | Status: SHIPPED | OUTPATIENT
Start: 2020-11-23 | End: 2022-07-25

## 2020-11-23 NOTE — ASSESSMENT & PLAN NOTE
This is a chronic issue.  She suffers from chronic pain in both of her knees and her spine.  Not currently seeing a specialist.  Taking 2 pain pills per day which she states controls her pain.    She  reports no history of alcohol use.  She  reports no history of drug use.    Any major change in health since last appointment? No    Consequences of Chronic Opiate therapy:  (5 A's)  Analgesia: Compared to no treatment or prior treatment, pain is currently not changed  Activity: not changed  Adverse Events: She denies constipation, dry mouth, itchy skin, nausea and sedation  Aberrant Behaviors: She reports she is not taking medication as prescribed and is not veering from agreed treatment regimen or provider recommendations. There have been no inappropriate refills or lost/stolen meds reported.  Affect/Mood: Pain is not impacting patient's mood.  Patient denies depression/anxiety.    Nonnarcotic treatments that are being used: Gabapentin.       Opioid Risk Score: 2    Interpretation of Opioid Risk Score   Score 0-3 = Low risk of abuse. Do UDS at least once per year.  Score 4-7 = Moderate risk of abuse. Do UDS 1-4 times per year.  Score 8+ = High risk of abuse. Refer to specialist.    Last order of CONTROLLED SUBSTANCE TREATMENT AGREEMENT was found on 5/2/2016 from Office Visit on 5/2/2016     UDS Summary                URINE DRUG SCREEN Next Due 6/12/2021      Done 6/17/2020 PAIN MANAGEMENT SCREEN     Patient has more history with this topic...        Most recent UDS reviewed today and is consistent with prescribed medications.     I have reviewed the medical records, the Prescription Monitoring Program and I have determined that controlled substance treatment is medically indicated.

## 2020-11-23 NOTE — ASSESSMENT & PLAN NOTE
Chronic issue but now controlled.  Did not feel well on an increase in olanzapine-fluoxetine 12-50 and is now back to 12-25, moods are stable.  Continues to use Seroquel at night for sleep.

## 2020-11-23 NOTE — PROGRESS NOTES
Chief Complaint   Patient presents with   • Chronic Opiate Therapy       HPI  Penelope is a 65-year-old established female here to follow-up on chronic issues:    Other chronic pain  This is a chronic issue.  She suffers from chronic pain in both of her knees and her spine.  Not currently seeing a specialist.  Taking 2 pain pills per day which she states controls her pain.    She  reports no history of alcohol use.  She  reports no history of drug use.    Any major change in health since last appointment? No    Consequences of Chronic Opiate therapy:  (5 A's)  Analgesia: Compared to no treatment or prior treatment, pain is currently not changed  Activity: not changed  Adverse Events: She denies constipation, dry mouth, itchy skin, nausea and sedation  Aberrant Behaviors: She reports she is not taking medication as prescribed and is not veering from agreed treatment regimen or provider recommendations. There have been no inappropriate refills or lost/stolen meds reported.  Affect/Mood: Pain is not impacting patient's mood.  Patient denies depression/anxiety.    Nonnarcotic treatments that are being used: Gabapentin.       Opioid Risk Score: 2    Interpretation of Opioid Risk Score   Score 0-3 = Low risk of abuse. Do UDS at least once per year.  Score 4-7 = Moderate risk of abuse. Do UDS 1-4 times per year.  Score 8+ = High risk of abuse. Refer to specialist.    Last order of CONTROLLED SUBSTANCE TREATMENT AGREEMENT was found on 5/2/2016 from Office Visit on 5/2/2016     UDS Summary                URINE DRUG SCREEN Next Due 6/12/2021      Done 6/17/2020 PAIN MANAGEMENT SCREEN     Patient has more history with this topic...        Most recent UDS reviewed today and is consistent with prescribed medications.     I have reviewed the medical records, the Prescription Monitoring Program and I have determined that controlled substance treatment is medically indicated.     Tobacco use disorder, continuous  Unfortunately she  "continues to smoke.  She has Chantix at home but is not taking it.    Anxiety  Chronic issue but now controlled.  Did not feel well on an increase in olanzapine-fluoxetine 12-50 and is now back to 12-25, moods are stable.  Continues to use Seroquel at night for sleep.        Past medical, surgical, family, and social history is reviewed and updated in Epic chart by me today.   Medications and allergies reviewed and updated in Epic chart by me today.     ROS:   As documented in history of present illness above    Exam:  /70   Pulse 86   Temp 36.6 °C (97.9 °F)   Resp 16   Ht 1.6 m (5' 3\")   Wt 72.6 kg (160 lb)   SpO2 94%   Gen. appears healthy in no distress   Skin appropriate for sex and age   Neck trachea is midline  Lungs unlabored breathing  Heart regular rate  Neuro gait and station normal   Psych appropriate, calm, interactive, well-groomed    Assessment / Plan / Medical Decision makin. Anxiety  -Now stable.  Continue same.  - Olanzapine-Fluoxetine HCl 12-25 MG Cap; Take 1 Tab by mouth every day.  Dispense: 90 Cap; Refill: 3    2. Spinal stenosis of lumbar region with neurogenic claudication  -Persistent.  Declines referrals to physiatry or PT at this time.  Has no interest in neurosurgery.  - HYDROcodone-acetaminophen (NORCO) 7.5-325 MG per tablet; Take 1 Tab by mouth 2 times a day as needed for up to 30 days.  Dispense: 60 Tab; Refill: 0  - HYDROcodone-acetaminophen (NORCO) 7.5-325 MG per tablet; Take 1 Tab by mouth 2 times a day as needed for up to 30 days.  Dispense: 60 Tab; Refill: 0  - HYDROcodone-acetaminophen (NORCO) 7.5-325 MG per tablet; Take 1 Tab by mouth 2 times a day as needed for up to 30 days.  Dispense: 60 Tab; Refill: 0    3. Chronic left hip pain  -Chronic but stable.  - HYDROcodone-acetaminophen (NORCO) 7.5-325 MG per tablet; Take 1 Tab by mouth 2 times a day as needed for up to 30 days.  Dispense: 60 Tab; Refill: 0  - HYDROcodone-acetaminophen (NORCO) 7.5-325 MG per tablet; " Take 1 Tab by mouth 2 times a day as needed for up to 30 days.  Dispense: 60 Tab; Refill: 0  - HYDROcodone-acetaminophen (NORCO) 7.5-325 MG per tablet; Take 1 Tab by mouth 2 times a day as needed for up to 30 days.  Dispense: 60 Tab; Refill: 0    4. Other chronic pain  -Chronic but stable.    5. Primary osteoarthritis of left knee  -Chronic but stable.    6. Chronic use of opiate for therapeutic purpose  -Overall impression that this patient is benefiting from opioid therapy and that the benefits outweigh the risks of continued use. yes   - Controlled Substance Use Agreement current  - Urine drug screen current and reviewed / compliant with rx medications  - Additional lab: CMP to assess liver and renal function - UTD   - Rx refill prescriptions are done for 3 months, No early refills.   - Referral to pain management no  In prescribing controlled substances to this patient, I certify that I have obtained and reviewed the medical history of Penelope Bautista. I have also made a good abelardo effort to obtain applicable records from other providers who have treated the patient and records did not demonstrate any increased risk of substance abuse that would prevent me from prescribing controlled substances.     I have conducted a physical exam and documented it. I have reviewed Ms. Bautista’s prescription history as maintained by the [place] Prescription Monitoring Program.     I have assessed the patient’s risk for abuse, dependency, and addiction using the validated Opioid Risk Tool available at https://www.mdcalc.com/kkloqu-medm-hrzh-ort-narcotic-abuse.     Given the above, I believe the benefits of controlled substance therapy outweigh the risks. The reasons for prescribing controlled substances include non-narcotic, oral analgesic alternatives have been inadequate for pain control. Accordingly, I have discussed the risk and benefits, treatment plan, and alternative therapies with the patient.       7. Tobacco use  disorder, continuous  -Strongly encouraged her to stop smoking.  Encouraged her to start Chantix.  Reminded her potential side effects of Chantix.    8. Need for vaccination  - Influenza Vaccine Quad Injection (PF)

## 2020-11-24 ENCOUNTER — OFFICE VISIT (OUTPATIENT)
Dept: MEDICAL GROUP | Facility: CLINIC | Age: 65
End: 2020-11-24
Payer: OTHER GOVERNMENT

## 2020-11-24 VITALS
RESPIRATION RATE: 18 BRPM | OXYGEN SATURATION: 92 % | TEMPERATURE: 98.2 F | WEIGHT: 160 LBS | HEART RATE: 112 BPM | DIASTOLIC BLOOD PRESSURE: 74 MMHG | BODY MASS INDEX: 28.35 KG/M2 | HEIGHT: 63 IN | SYSTOLIC BLOOD PRESSURE: 118 MMHG

## 2020-11-24 DIAGNOSIS — S92.352A FRACTURE OF BASE OF FIFTH METATARSAL BONE, LEFT, CLOSED, INITIAL ENCOUNTER: ICD-10-CM

## 2020-11-24 DIAGNOSIS — F17.200 SMOKER: ICD-10-CM

## 2020-11-24 PROCEDURE — 28470 CLTX METATARSAL FX WO MNP EA: CPT | Mod: LT | Performed by: FAMILY MEDICINE

## 2020-11-24 ASSESSMENT — ENCOUNTER SYMPTOMS
FEVER: 0
VOMITING: 0
CHILLS: 0
NAUSEA: 0
SHORTNESS OF BREATH: 0
DIZZINESS: 0

## 2020-11-24 NOTE — PROGRESS NOTES
"Subjective:      Penelope Bautista is a 65 y.o. female who presents with Foot Injury (Referral from / L foot injury )     Referred by Anali Tyson P.A.-C.  for evaluation of LEFT foot pain    HPI   LEFT with pain  Date of injury, roughly October 29, 2020   Mechanism injury, stood up from a seated position and her foot was \"asleep\", causing her to invert and fall  She heard a pop at the time of injury  Sudden sharp pain associated with the injury  Sharp  Pain is predominantly along the base of the fifth metatarsal of the LEFT foot  Occasional pain to the arch of the foot as well  Denies any prior issues with the FOOT  She has been taking Advil, and also takes hydrocodone twice daily for unrelated issue  No night symptoms  Swelling has improved    Activities include homemaking    Review of Systems   Constitutional: Negative for chills and fever.   Respiratory: Negative for shortness of breath.    Cardiovascular: Negative for chest pain.   Gastrointestinal: Negative for nausea and vomiting.   Neurological: Negative for dizziness.         PMH:  has a past medical history of Anxiety, Arthritis, Asthma, CATARACT, Chronic lumbar pain, Depression, endogenous (HCC), and Labral tear of hip joint.  MEDS:   Current Outpatient Medications:   •  Olanzapine-Fluoxetine HCl 12-25 MG Cap, Take 1 Tab by mouth every day., Disp: 90 Cap, Rfl: 3  •  [START ON 1/30/2021] HYDROcodone-acetaminophen (NORCO) 7.5-325 MG per tablet, Take 1 Tab by mouth 2 times a day as needed for up to 30 days., Disp: 60 Tab, Rfl: 0  •  [START ON 12/31/2020] HYDROcodone-acetaminophen (NORCO) 7.5-325 MG per tablet, Take 1 Tab by mouth 2 times a day as needed for up to 30 days., Disp: 60 Tab, Rfl: 0  •  [START ON 12/1/2020] HYDROcodone-acetaminophen (NORCO) 7.5-325 MG per tablet, Take 1 Tab by mouth 2 times a day as needed for up to 30 days., Disp: 60 Tab, Rfl: 0  •  phentermine (ADIPEX-P) 37.5 MG tablet, TAKE ONE TABLET BY MOUTH EVERY MORNING BEFORE " "BREAKFAST, Disp: 30 Tab, Rfl: 0  •  gabapentin (NEURONTIN) 300 MG Cap, Take 1-2 Caps by mouth every evening., Disp: 180 Cap, Rfl: 3  •  propranolol (INDERAL) 10 MG Tab, TAKE 1 TABLET DAILY FOR PALPITATIONS AND SLEEP, Disp: 90 Tab, Rfl: 3  •  quetiapine (SEROQUEL) 50 MG tablet, TAKE ONE TABLET BY MOUTH EVERY EVENING, Disp: 30 Tab, Rfl: 11  •  fluticasone-salmeterol (ADVAIR DISKUS) 250-50 MCG/DOSE AEROSOL POWDER, BREATH ACTIVATED, Inhale 1 Puff by mouth as needed. Indications: Asthma, Disp: 3 Inhaler, Rfl: 3  •  albuterol (PROAIR HFA) 108 (90 Base) MCG/ACT Aero Soln inhalation aerosol, Inhale 2 Puffs by mouth every 6 hours as needed. Indications: Asthma, Disp: 3 Inhaler, Rfl: 3  ALLERGIES:   Allergies   Allergen Reactions   • Ceclor [Cefaclor] Swelling     SURGHX:   Past Surgical History:   Procedure Laterality Date   • KNEE ARTHROPLASTY TOTAL  2/11/2014    Performed by Kushal Rivera M.D. at SURGERY [facility] ORS   • APPENDECTOMY     • CATARACT EXTRACTION WITH IOL      bilateral   • GADIEL BY LAPAROSCOPY     • GYN SURGERY      tubal   • OTHER      facial lift - 2016 - Dr. Faust     SOCX:  reports that she has been smoking cigarettes. She has a 17.00 pack-year smoking history. She has never used smokeless tobacco. She reports that she does not drink alcohol or use drugs.  FH: Family history was reviewed, no pertinent findings to report     Objective:     /74 (BP Location: Left arm, Patient Position: Sitting, BP Cuff Size: Adult)   Pulse (!) 112   Temp 36.8 °C (98.2 °F) (Temporal)   Resp 18   Ht 1.6 m (5' 3\")   Wt 72.6 kg (160 lb)   SpO2 92%   BMI 28.34 kg/m²      Physical Exam      RIGHT ANKLE:  There is NO swelling noted at the ankle  Range of motion intact with dorsiflexion and plantarflexion, inversion and eversion  There is NO tenderness of the ATFL, CF or PTF ligament  There is NO tenderness of the lateral malleolus or medial malleolus  Anterior drawer testing is NEGATIVE  Talar tilt testing " "is NEGATIVE  The foot and ankle is otherwise neurovascularly intact    RIGHT FOOT:  There is NO swelling noted at the foot  There is NO tenderness at the base of the fifth metatarsal, cuboid, or tarsal navicular  There is NO pain with metatarsal squeeze test    LEFT ANKLE:  There is NO swelling noted at the ankle  Range of motion intact with dorsiflexion and plantarflexion, inversion and eversion  There is NO tenderness of the ATFL, CF or PTF ligament  There is NO tenderness of the lateral malleolus or medial malleolus  Anterior drawer testing is NEGATIVE  Talar tilt testing is NEGATIVE  The foot and ankle is otherwise neurovascularly intact    LEFT FOOT:  There is NO swelling noted at the foot  There is POSITIVE tenderness at the base of the fifth metatarsal, without tenderness of the cuboid, or tarsal navicular  There is NO pain with metatarsal squeeze test    NEUTRAL stance  Able to ambulate with NORMAL gait in CAM Walker boot    Assessment/Plan:        1. Fracture of base of fifth metatarsal bone, left, closed, initial encounter     2. Smoker  REFERRAL TO TOBACCO CESSATION PROGRAM     Date of injury, roughly October 29, 2020   Mechanism injury, stood up from a seated position and her foot was \"asleep\", causing her to invert and fall  She heard a pop at the time of injury    Fracture was stabilized in a tall cam walker boot in urgent care  The plan is to continue tall cam walker boot for a total of 6 weeks from the date of injury (until on or after December 10, 2020)     Return in about 3 weeks (around 12/15/2020).        11/14/2020 9:45 AM     HISTORY/REASON FOR EXAM:  Left lateral foot pain after fall 2 weeks ago        TECHNIQUE/EXAM DESCRIPTION AND NUMBER OF VIEWS:  3 views of the LEFT foot.     COMPARISON:  None.     FINDINGS:  There is a transverse nondisplaced fracture through the base of the left 5th metatarsal.  No other fracture is identified.  No dislocation is present.     IMPRESSION:     Transverse " nondisplaced fracture through the base of the left 5th metatarsal.    Interpreted in the office today with the patient    Thank you Anali Tyson P.A.-C.  For allowing me to participate in caring for your patient.

## 2020-11-30 DIAGNOSIS — E66.3 OVERWEIGHT: ICD-10-CM

## 2020-11-30 RX ORDER — PHENTERMINE HYDROCHLORIDE 37.5 MG/1
TABLET ORAL
Qty: 30 TAB | Refills: 0 | Status: SHIPPED | OUTPATIENT
Start: 2020-11-30 | End: 2020-12-31

## 2020-12-08 ENCOUNTER — TELEPHONE (OUTPATIENT)
Dept: MEDICAL GROUP | Facility: LAB | Age: 65
End: 2020-12-08

## 2020-12-08 NOTE — TELEPHONE ENCOUNTER
DOCUMENTATION OF PAR STATUS:    1. Name of Medication & Dose: Phentermine     2. Name of Prescription Coverage Company & phone #: cover my meds/ [id number]    3. Date Prior Auth Submitted: 12/8/20    4. What information was given to obtain insurance decision? Ov notes faxed    5. Prior Auth Status? Sent message to patient to inquire of statuePending     submitted a prior auth for phentermine on 12/8/20 and have not heard back from your insurance. Were you able to get this?    Last read by Penelope Bautista at 9:59 AM on 12/22/2020.  Penelope Bautista  to Me          12/22/20 10:03 AM  Good Morning;    I am not sure I rec. my last refill but I do not know about the future for this refills.                                                                    Thank You                                                                      Penelope  Me  to Penelope Bautista          12/22/20 10:33 AM  Let me know when you refill this. Good rx also may have coupons for this. Most insurances do not cover this.

## 2020-12-31 DIAGNOSIS — E66.3 OVERWEIGHT: ICD-10-CM

## 2020-12-31 DIAGNOSIS — J45.40 MODERATE PERSISTENT ASTHMA WITHOUT COMPLICATION: ICD-10-CM

## 2020-12-31 NOTE — TELEPHONE ENCOUNTER
Received request via: Pharmacy    Was the patient seen in the last year in this department? Yes  11/23/20  Does the patient have an active prescription (recently filled or refills available) for medication(s) requested? No

## 2021-01-04 RX ORDER — PHENTERMINE HYDROCHLORIDE 37.5 MG/1
TABLET ORAL
Qty: 30 TAB | Refills: 0 | Status: SHIPPED | OUTPATIENT
Start: 2021-01-04 | End: 2021-02-08

## 2021-01-04 NOTE — TELEPHONE ENCOUNTER
Received request via: Pharmacy    Was the patient seen in the last year in this department? Yes    Does the patient have an active prescription (recently filled or refills available) for medication(s) requested? No     ADVAIR DISKUS 60'S 250/50MCG

## 2021-02-01 ENCOUNTER — OFFICE VISIT (OUTPATIENT)
Dept: URGENT CARE | Facility: PHYSICIAN GROUP | Age: 66
End: 2021-02-01
Payer: OTHER GOVERNMENT

## 2021-02-01 ENCOUNTER — HOSPITAL ENCOUNTER (OUTPATIENT)
Dept: RADIOLOGY | Facility: MEDICAL CENTER | Age: 66
End: 2021-02-01
Attending: NURSE PRACTITIONER
Payer: OTHER GOVERNMENT

## 2021-02-01 VITALS
RESPIRATION RATE: 16 BRPM | TEMPERATURE: 97.7 F | DIASTOLIC BLOOD PRESSURE: 52 MMHG | BODY MASS INDEX: 29.59 KG/M2 | SYSTOLIC BLOOD PRESSURE: 106 MMHG | HEART RATE: 101 BPM | OXYGEN SATURATION: 94 % | HEIGHT: 63 IN | WEIGHT: 167 LBS

## 2021-02-01 DIAGNOSIS — S99.922A INJURY OF LEFT FOOT, INITIAL ENCOUNTER: ICD-10-CM

## 2021-02-01 DIAGNOSIS — M79.672 FOOT PAIN, LEFT: ICD-10-CM

## 2021-02-01 DIAGNOSIS — S93.602A FOOT SPRAIN, LEFT, INITIAL ENCOUNTER: Primary | ICD-10-CM

## 2021-02-01 PROCEDURE — 99214 OFFICE O/P EST MOD 30 MIN: CPT | Performed by: NURSE PRACTITIONER

## 2021-02-01 PROCEDURE — 73630 X-RAY EXAM OF FOOT: CPT | Mod: LT

## 2021-02-01 ASSESSMENT — PAIN SCALES - GENERAL: PAINLEVEL: 7=MODERATE-SEVERE PAIN

## 2021-02-01 NOTE — PROGRESS NOTES
"Penelope Bautista is a 65 y.o. female who presents for Foot Injury (left foot pain twisted ankle and heard a pop )      HPI .This is a new problem.  C/o left foot pain and swelling.  DOI 2 weeks ago. She got out of a chair and twisted her ankle. She felt a pop. Feels still. Hurting at outside of foot. Pain \" not too bad\" 2/10. Walking makes it worse. Treatments tried: Advil - which does give her some temporary relief of pain. Ice packs/ elevation - were helping but not helping now.   Fracture Left foot 2 months ago ( metarsal 5th)  No other aggravating or alleviating factors.        Review of Systems   Constitutional: Negative for chills and fever.   Gastrointestinal: Negative for nausea.   Musculoskeletal: Positive for joint pain. Negative for falls and myalgias.   Neurological: Negative for tingling and sensory change.   Psychiatric/Behavioral: Negative for memory loss and substance abuse.       Allergies   Allergen Reactions   • Ceclor [Cefaclor] Swelling     Past Medical History:   Diagnosis Date   • Anxiety    • Arthritis     knee   • Asthma     allergy triggers   • CATARACT     [place] IOL   • Chronic lumbar pain    • Depression, endogenous (HCC)    • Labral tear of hip joint     LEFT     Past Surgical History:   Procedure Laterality Date   • KNEE ARTHROPLASTY TOTAL  2/11/2014    Performed by Kushal Rivera M.D. at SURGERY [facility] ORS   • APPENDECTOMY     • CATARACT EXTRACTION WITH IOL      bilateral   • GADIEL BY LAPAROSCOPY     • GYN SURGERY      tubal   • OTHER      facial lift - 2016 - Dr. Faust     Family History   Problem Relation Age of Onset   • Cancer Paternal Uncle         Colon CA   • Other Mother         dementia   • Cancer Paternal Aunt         colon cancer    • Cancer Paternal Uncle         colon cancer      Social History     Tobacco Use   • Smoking status: Former Smoker     Packs/day: 0.50     Years: 34.00     Pack years: 17.00     Types: Cigarettes   • Smokeless tobacco: Never Used   • " "Tobacco comment: quit on 11/20   Substance Use Topics   • Alcohol use: No     Patient Active Problem List   Diagnosis   • Tobacco use disorder, continuous   • Insomnia   • Anxiety   • Chronic use of opiate for therapeutic purpose   • Opioid type dependence, continuous (HCC)   • Osteoarthritis of knee - left. (right is replaced)   • Other chronic pain   • Spinal stenosis of lumbar region   • Colon polyps - DHA - q 3 yrs   • Overweight (BMI 25.0-29.9)     Current Outpatient Medications on File Prior to Visit   Medication Sig Dispense Refill   • QUEtiapine (SEROQUEL) 50 MG tablet TAKE ONE TABLET BY MOUTH EVERY EVENING 90 Tab 3   • phentermine (ADIPEX-P) 37.5 MG tablet TAKE ONE TABLET BY MOUTH EVERY MORNING BEFORE BREAKFAST 30 DAYS 30 Tab 0   • ADVAIR DISKUS 250-50 MCG/DOSE AEROSOL POWDER, BREATH ACTIVATED USE 1 INHALATION TWICE A  Each 3   • Olanzapine-Fluoxetine HCl 12-25 MG Cap Take 1 Tab by mouth every day. 90 Cap 3   • HYDROcodone-acetaminophen (NORCO) 7.5-325 MG per tablet Take 1 Tab by mouth 2 times a day as needed for up to 30 days. 60 Tab 0   • gabapentin (NEURONTIN) 300 MG Cap Take 1-2 Caps by mouth every evening. 180 Cap 3   • propranolol (INDERAL) 10 MG Tab TAKE 1 TABLET DAILY FOR PALPITATIONS AND SLEEP 90 Tab 3   • albuterol (PROAIR HFA) 108 (90 Base) MCG/ACT Aero Soln inhalation aerosol Inhale 2 Puffs by mouth every 6 hours as needed. Indications: Asthma 3 Inhaler 3     No current facility-administered medications on file prior to visit.           Objective:     /52   Pulse (!) 101   Temp 36.5 °C (97.7 °F)   Resp 16   Ht 1.6 m (5' 3\")   Wt 75.8 kg (167 lb) Comment: stated  SpO2 94%   BMI 29.58 kg/m²     Physical Exam  Vitals signs and nursing note reviewed.   Constitutional:       General: She is not in acute distress.     Appearance: She is well-developed. She is not toxic-appearing.   HENT:      Head: Normocephalic.   Cardiovascular:      Rate and Rhythm: Normal rate and regular " rhythm.   Pulmonary:      Effort: Pulmonary effort is normal.      Breath sounds: Normal breath sounds.   Musculoskeletal:      Left foot: Normal range of motion and normal capillary refill. Tenderness and bony tenderness present. No swelling or crepitus.        Feet:    Skin:     General: Skin is warm and dry.   Neurological:      Mental Status: She is alert and oriented to person, place, and time.   Psychiatric:         Behavior: Behavior is cooperative.       2/1/2021 8:42 AM     HISTORY/REASON FOR EXAM:  Pain/Deformity Following Trauma; Previous fracture \R\ 2 months ago 5th metatarsal.  NEW injury 2 weeks ago.        TECHNIQUE/EXAM DESCRIPTION AND NUMBER OF VIEWS:  3 views of the LEFT foot.     COMPARISON:  11/14/2020     FINDINGS:  The bony structures are demineralized. Transversely oriented fracture line in the base of the left fifth metatarsal is less distinct, which suggest partial healing. No new fractures or dislocations are appreciated.     IMPRESSION:     Nondisplaced fracture of the base of the left fifth metatarsal is less distinct than on previous exam which suggest partial healing.             Last Resulted: 02/01/21  9:06 AM        Xray: Reviewed and interpreted independently by me. I agree with the radiologist's findings.       Assessment /Associated Orders:      1. Foot sprain, left, initial encounter     2. Injury of left foot, initial encounter  DX-FOOT-COMPLETE 3+ LEFT   3. Foot pain, left  DX-FOOT-COMPLETE 3+ LEFT         Medical Decision Making:      VSS at today's acute urgent care visit.   Old fracture healing well. Acute sprain present on exam.   PRICE therapy:   P- protect from further injury  R- rest the affected area as much as possible while pain and swelling persist  I- Ice packs - 15 minutes every 2 hours for the first 24 hours, then 4-5 times daily   C- compress either with splint or ace wrap as directed  E-elevate the extremity to help with swelling and pain  OTC  analgesic of  choice (acetaminophen or NSAID). Follow manufactures dosing and safety precautions.   Results of all diagnostic tests discussed with patient.   ACE wrap / ankle brace prn pain     Discussed management options (risks,benefits, and alternatives to treatment). Pt expresses understanding and the treatment plan was agreed upon. Questions were encouraged and answered to pt's satisfaction.   Advised the patient to follow-up with the primary care physician for recheck, reevaluation, and consideration of further management if necessary.   Return to urgent care prn if new or worsening sx or if there is no improvement in condition prn. Red flags discussed and indications to immediately call 911 or present to the Emergency Department.     I personally reviewed prior external notes and test results pertinent to today's visit.  I have independently reviewed and interpreted all diagnostics ordered during this urgent care acute visit.   Time spent evaluating this patient was at least 30 minutes and includes preparing for visit, counseling/education, exam and evaluation, obtaining history, independent interpretation and ordering lab/test/procedures.      Please note that this dictation was created using voice recognition software. I have made a reasonable attempt to correct obvious errors, but I expect that there are errors of grammar and possibly content that I did not discover before finalizing the note.    This note was electronically signed by Anitha DURAN, Urgent Care

## 2021-02-02 ASSESSMENT — ENCOUNTER SYMPTOMS
TINGLING: 0
SENSORY CHANGE: 0
CHILLS: 0
MEMORY LOSS: 0
FALLS: 0
MYALGIAS: 0
FEVER: 0
NAUSEA: 0

## 2021-02-02 ASSESSMENT — LIFESTYLE VARIABLES: SUBSTANCE_ABUSE: 0

## 2021-02-05 DIAGNOSIS — E66.3 OVERWEIGHT: ICD-10-CM

## 2021-02-08 RX ORDER — PHENTERMINE HYDROCHLORIDE 37.5 MG/1
TABLET ORAL
Qty: 30 TAB | Refills: 0 | Status: SHIPPED | OUTPATIENT
Start: 2021-02-08 | End: 2021-03-08

## 2021-03-08 DIAGNOSIS — E66.3 OVERWEIGHT: ICD-10-CM

## 2021-03-08 RX ORDER — PHENTERMINE HYDROCHLORIDE 37.5 MG/1
TABLET ORAL
Qty: 30 TABLET | Refills: 0 | Status: SHIPPED | OUTPATIENT
Start: 2021-03-08 | End: 2021-04-09

## 2021-03-08 NOTE — TELEPHONE ENCOUNTER
Received request via: Pharmacy    Was the patient seen in the last year in this department? Yes  11/23/2020  Does the patient have an active prescription (recently filled or refills available) for medication(s) requested? No

## 2021-03-11 ENCOUNTER — OFFICE VISIT (OUTPATIENT)
Dept: MEDICAL GROUP | Facility: LAB | Age: 66
End: 2021-03-11
Payer: OTHER GOVERNMENT

## 2021-03-11 VITALS
TEMPERATURE: 97.9 F | BODY MASS INDEX: 30.3 KG/M2 | DIASTOLIC BLOOD PRESSURE: 64 MMHG | WEIGHT: 171 LBS | HEART RATE: 106 BPM | HEIGHT: 63 IN | RESPIRATION RATE: 12 BRPM | SYSTOLIC BLOOD PRESSURE: 108 MMHG | OXYGEN SATURATION: 93 %

## 2021-03-11 DIAGNOSIS — Z79.891 CHRONIC USE OF OPIATE FOR THERAPEUTIC PURPOSE: ICD-10-CM

## 2021-03-11 DIAGNOSIS — M25.552 CHRONIC LEFT HIP PAIN: ICD-10-CM

## 2021-03-11 DIAGNOSIS — G89.29 CHRONIC LEFT HIP PAIN: ICD-10-CM

## 2021-03-11 DIAGNOSIS — M48.062 SPINAL STENOSIS OF LUMBAR REGION WITH NEUROGENIC CLAUDICATION: ICD-10-CM

## 2021-03-11 PROCEDURE — 99213 OFFICE O/P EST LOW 20 MIN: CPT | Performed by: NURSE PRACTITIONER

## 2021-03-11 RX ORDER — HYDROCODONE BITARTRATE AND ACETAMINOPHEN 7.5; 325 MG/1; MG/1
1 TABLET ORAL 2 TIMES DAILY PRN
Qty: 60 TABLET | Refills: 0 | Status: SHIPPED | OUTPATIENT
Start: 2021-03-11 | End: 2021-04-10

## 2021-03-11 RX ORDER — HYDROCODONE BITARTRATE AND ACETAMINOPHEN 7.5; 325 MG/1; MG/1
1 TABLET ORAL 2 TIMES DAILY PRN
Qty: 60 TABLET | Refills: 0 | Status: SHIPPED
Start: 2021-04-10 | End: 2021-05-02

## 2021-03-11 RX ORDER — HYDROCODONE BITARTRATE AND ACETAMINOPHEN 7.5; 325 MG/1; MG/1
1 TABLET ORAL 2 TIMES DAILY PRN
Qty: 60 TABLET | Refills: 0 | Status: SHIPPED | OUTPATIENT
Start: 2021-05-10 | End: 2021-06-08 | Stop reason: SDUPTHER

## 2021-03-11 ASSESSMENT — PATIENT HEALTH QUESTIONNAIRE - PHQ9: CLINICAL INTERPRETATION OF PHQ2 SCORE: 0

## 2021-03-11 NOTE — PROGRESS NOTES
"Chief Complaint   Patient presents with   • Chronic Opiate Therapy       HPI   Penelope is a 64 yo est female here to f/u on chronic pain.  Known spinal stenosis.  No interest in physiatry or surgery.  Other chronic pain  This is a chronic issue.  She suffers from chronic pain in both of her knees and her spine.  Not currently seeing a specialist.  Taking 2 pain pills per day which she states controls her pain.     She  reports no history of alcohol use.  She  reports no history of drug use.    Any major change in health since last appointment? No     Consequences of Chronic Opiate therapy:  (5 A's)  Analgesia: Compared to no treatment or prior treatment, pain is currently not changed  Activity: not changed  Adverse Events: She denies constipation, dry mouth, itchy skin, nausea and sedation  Aberrant Behaviors: She reports she is not taking medication as prescribed and is not veering from agreed treatment regimen or provider recommendations. There have been no inappropriate refills or lost/stolen meds reported.  Affect/Mood: Pain is not impacting patient's mood.  Patient denies depression/anxiety.     Nonnarcotic treatments that are being used: Gabapentin.         Opioid Risk Score: 2     Interpretation of Opioid Risk Score   Score 0-3 = Low risk of abuse. Do UDS at least once per year.  Score 4-7 = Moderate risk of abuse. Do UDS 1-4 times per year.  Score 8+ = High risk of abuse. Refer to specialist.    Urine drug screen and controlled substance agreement are up-to-date from June 2020      Past medical, surgical, family, and social history is reviewed and updated in Epic chart by me today.   Medications and allergies reviewed and updated in Epic chart by me today.     ROS:   As documented in history of present illness above    Exam:  /64 (BP Location: Left arm, Patient Position: Sitting, BP Cuff Size: Large adult)   Pulse (!) 106   Temp 36.6 °C (97.9 °F)   Resp 12   Ht 1.6 m (5' 3\")   Wt 77.6 kg (171 lb)  " " SpO2 93%   Gen. appears healthy in no distress   Skin appropriate for sex and age   Neck trachea is midline  Lungs unlabored breathing  Heart regular rate  Neuro gait and station normal   Psych appropriate, calm, interactive, well-groomed    Assessment / Plan / Medical Decision making:   \"  1. Spinal stenosis of lumbar region with neurogenic claudication  HYDROcodone-acetaminophen (NORCO) 7.5-325 MG per tablet    HYDROcodone-acetaminophen (NORCO) 7.5-325 MG per tablet    HYDROcodone-acetaminophen (NORCO) 7.5-325 MG per tablet   2. Chronic left hip pain  HYDROcodone-acetaminophen (NORCO) 7.5-325 MG per tablet    HYDROcodone-acetaminophen (NORCO) 7.5-325 MG per tablet    HYDROcodone-acetaminophen (NORCO) 7.5-325 MG per tablet   3. Chronic use of opiate for therapeutic purpose     \"  Overall impression that this patient is benefiting from opioid therapy and that the benefits outweigh the risks of continued use. yes   - Controlled Substance Use Agreement current  - Urine drug screen current and reviewed / compliant with rx medications  - Additional lab: CMP to assess liver and renal function - UTD   - Rx refill prescriptions are done for 3 months, No early refills.   - Referral to pain management no  In prescribing controlled substances to this patient, I certify that I have obtained and reviewed the medical history of Penelope Bautista. I have also made a good abelardo effort to obtain applicable records from other providers who have treated the patient and records did not demonstrate any increased risk of substance abuse that would prevent me from prescribing controlled substances.     I have conducted a physical exam and documented it. I have reviewed Ms. Bautista’s prescription history as maintained by the [place] Prescription Monitoring Program.     I have assessed the patient’s risk for abuse, dependency, and addiction using the validated Opioid Risk Tool available at " https://www.[employer].com/gsuwln-htkh-awtg-ort-narcotic-abuse.     Given the above, I believe the benefits of controlled substance therapy outweigh the risks. The reasons for prescribing controlled substances include non-narcotic, oral analgesic alternatives have been inadequate for pain control. Accordingly, I have discussed the risk and benefits, treatment plan, and alternative therapies with the patient.

## 2021-03-15 DIAGNOSIS — Z23 NEED FOR VACCINATION: ICD-10-CM

## 2021-03-30 ENCOUNTER — IMMUNIZATION (OUTPATIENT)
Dept: FAMILY PLANNING/WOMEN'S HEALTH CLINIC | Facility: IMMUNIZATION CENTER | Age: 66
End: 2021-03-30
Attending: INTERNAL MEDICINE
Payer: OTHER GOVERNMENT

## 2021-03-30 DIAGNOSIS — Z23 NEED FOR VACCINATION: ICD-10-CM

## 2021-03-30 DIAGNOSIS — Z23 ENCOUNTER FOR VACCINATION: Primary | ICD-10-CM

## 2021-03-30 PROCEDURE — 0001A PFIZER SARS-COV-2 VACCINE: CPT | Performed by: NURSE PRACTITIONER

## 2021-03-30 PROCEDURE — 91300 PFIZER SARS-COV-2 VACCINE: CPT | Performed by: NURSE PRACTITIONER

## 2021-04-09 DIAGNOSIS — E66.3 OVERWEIGHT: ICD-10-CM

## 2021-04-09 NOTE — TELEPHONE ENCOUNTER
Received request via: Pharmacy    Was the patient seen in the last year in this department? Yes  3/11/21  Does the patient have an active prescription (recently filled or refills available) for medication(s) requested? No   intact

## 2021-04-12 RX ORDER — PHENTERMINE HYDROCHLORIDE 37.5 MG/1
TABLET ORAL
Qty: 30 TABLET | Refills: 0 | Status: SHIPPED | OUTPATIENT
Start: 2021-04-12 | End: 2021-05-13

## 2021-04-20 ENCOUNTER — HOSPITAL ENCOUNTER (OUTPATIENT)
Facility: MEDICAL CENTER | Age: 66
End: 2021-04-20
Attending: NURSE PRACTITIONER
Payer: OTHER GOVERNMENT

## 2021-04-20 ENCOUNTER — OFFICE VISIT (OUTPATIENT)
Dept: URGENT CARE | Facility: PHYSICIAN GROUP | Age: 66
End: 2021-04-20
Payer: OTHER GOVERNMENT

## 2021-04-20 VITALS
BODY MASS INDEX: 29.59 KG/M2 | SYSTOLIC BLOOD PRESSURE: 118 MMHG | RESPIRATION RATE: 14 BRPM | HEART RATE: 105 BPM | OXYGEN SATURATION: 93 % | WEIGHT: 167 LBS | HEIGHT: 63 IN | TEMPERATURE: 97.7 F | DIASTOLIC BLOOD PRESSURE: 70 MMHG

## 2021-04-20 DIAGNOSIS — R35.0 URINARY FREQUENCY: ICD-10-CM

## 2021-04-20 DIAGNOSIS — N30.01 ACUTE CYSTITIS WITH HEMATURIA: ICD-10-CM

## 2021-04-20 DIAGNOSIS — R30.0 DYSURIA: ICD-10-CM

## 2021-04-20 PROCEDURE — 87086 URINE CULTURE/COLONY COUNT: CPT

## 2021-04-20 PROCEDURE — 87077 CULTURE AEROBIC IDENTIFY: CPT

## 2021-04-20 PROCEDURE — 87186 SC STD MICRODIL/AGAR DIL: CPT

## 2021-04-20 PROCEDURE — 99214 OFFICE O/P EST MOD 30 MIN: CPT | Performed by: NURSE PRACTITIONER

## 2021-04-20 RX ORDER — NITROFURANTOIN 25; 75 MG/1; MG/1
100 CAPSULE ORAL EVERY 12 HOURS
Qty: 10 CAPSULE | Refills: 0 | Status: SHIPPED | OUTPATIENT
Start: 2021-04-20 | End: 2021-04-25

## 2021-04-20 RX ORDER — PHENAZOPYRIDINE HYDROCHLORIDE 200 MG/1
200 TABLET, FILM COATED ORAL 3 TIMES DAILY PRN
Qty: 6 TABLET | Refills: 0 | Status: SHIPPED | OUTPATIENT
Start: 2021-04-20 | End: 2021-04-22

## 2021-04-20 ASSESSMENT — ENCOUNTER SYMPTOMS
VOMITING: 0
SENSORY CHANGE: 0
DIARRHEA: 0
ABDOMINAL PAIN: 0
NAUSEA: 0
HEADACHES: 0
CONSTIPATION: 0
NECK PAIN: 0
COUGH: 0
FEVER: 0

## 2021-04-20 ASSESSMENT — LIFESTYLE VARIABLES: SUBSTANCE_ABUSE: 0

## 2021-04-20 NOTE — PROGRESS NOTES
Penelope Bautista is a 65 y.o. female who presents for Dysuria (x 5 days)      HPI this new prep.  Penelope is a 65-year-old female presents with dysuria for 5 days.  She feels like she could have a urinary tract infection since her symptoms are being persistent.  She has stinging at the end of urination.  She has tried to increase her fluid intake over the past couple days but does not had significant relief of her symptoms.  She denies fever, nausea, flank pain, abdominal pain, change in bowel.  She said no recent illness.  No recent surgical intervention or catheterization.  No other aggravating or alleviating factors.  She does have a history of previous UTIs.    Review of Systems   Constitutional: Negative for fever and malaise/fatigue.   HENT: Negative for congestion.    Respiratory: Negative for cough.    Cardiovascular: Negative for chest pain.   Gastrointestinal: Negative for abdominal pain, constipation, diarrhea, nausea and vomiting.   Musculoskeletal: Negative for neck pain.   Neurological: Negative for sensory change and headaches.   Psychiatric/Behavioral: Negative for substance abuse.       Allergies   Allergen Reactions   • Ceclor [Cefaclor] Swelling     Past Medical History:   Diagnosis Date   • Anxiety    • Arthritis     knee   • Asthma     allergy triggers   • CATARACT     [place] IOL   • Chronic lumbar pain    • Depression, endogenous (HCC)    • Labral tear of hip joint     LEFT     Past Surgical History:   Procedure Laterality Date   • KNEE ARTHROPLASTY TOTAL  2/11/2014    Performed by Kushal Rivera M[facility]D. at [facility]   • APPENDECTOMY     • CATARACT EXTRACTION WITH IOL      bilateral   • GADIEL BY LAPAROSCOPY     • GYN SURGERY      tubal   • OTHER      facial lift - 2016 - Dr. Faust     Family History   Problem Relation Age of Onset   • Cancer Paternal Uncle         Colon CA   • Other Mother         dementia   • Cancer Paternal Aunt         colon cancer    • Cancer Paternal Uncle          colon cancer      Social History     Tobacco Use   • Smoking status: Former Smoker     Packs/day: 0.50     Years: 34.00     Pack years: 17.00     Types: Cigarettes   • Smokeless tobacco: Never Used   • Tobacco comment: quit on 11/20   Substance Use Topics   • Alcohol use: No     Patient Active Problem List   Diagnosis   • Tobacco use disorder, continuous   • Insomnia   • Anxiety   • Chronic use of opiate for therapeutic purpose   • Opioid type dependence, continuous (HCC)   • Osteoarthritis of knee - left. (right is replaced)   • Other chronic pain   • Spinal stenosis of lumbar region   • Colon polyps - DHA - q 3 yrs   • Overweight (BMI 25.0-29.9)     Current Outpatient Medications on File Prior to Visit   Medication Sig Dispense Refill   • phentermine (ADIPEX-P) 37.5 MG tablet TAKE ONE TABLET BY MOUTH EVERY MORNING BEFORE BREAKFAST 30 tablet 0   • [START ON 5/10/2021] HYDROcodone-acetaminophen (NORCO) 7.5-325 MG per tablet Take 1 tablet by mouth 2 times a day as needed for up to 30 days. 60 tablet 0   • QUEtiapine (SEROQUEL) 50 MG tablet TAKE ONE TABLET BY MOUTH EVERY EVENING 90 Tab 3   • ADVAIR DISKUS 250-50 MCG/DOSE AEROSOL POWDER, BREATH ACTIVATED USE 1 INHALATION TWICE A  Each 3   • Olanzapine-Fluoxetine HCl 12-25 MG Cap Take 1 Tab by mouth every day. 90 Cap 3   • gabapentin (NEURONTIN) 300 MG Cap Take 1-2 Caps by mouth every evening. 180 Cap 3   • propranolol (INDERAL) 10 MG Tab TAKE 1 TABLET DAILY FOR PALPITATIONS AND SLEEP 90 Tab 3   • albuterol (PROAIR HFA) 108 (90 Base) MCG/ACT Aero Soln inhalation aerosol Inhale 2 Puffs by mouth every 6 hours as needed. Indications: Asthma 3 Inhaler 3   • HYDROcodone-acetaminophen (NORCO) 7.5-325 MG per tablet Take 1 tablet by mouth 2 times a day as needed for up to 30 days. 60 tablet 0     No current facility-administered medications on file prior to visit.          Objective:     /70   Pulse (!) 105   Temp 36.5 °C (97.7 °F)   Resp 14   Ht 1.6 m  "(5' 3\")   Wt 75.8 kg (167 lb)   SpO2 93%   BMI 29.58 kg/m²     Physical Exam  Vitals and nursing note reviewed.   Constitutional:       General: She is not in acute distress.     Appearance: Normal appearance. She is well-developed. She is not ill-appearing, toxic-appearing or diaphoretic.   HENT:      Head: Normocephalic.   Cardiovascular:      Rate and Rhythm: Normal rate and regular rhythm.      Pulses: Normal pulses.   Pulmonary:      Effort: Pulmonary effort is normal.   Abdominal:      General: There is no distension.      Palpations: Abdomen is soft. Abdomen is not rigid.      Tenderness: There is no abdominal tenderness. There is no right CVA tenderness, left CVA tenderness or guarding.   Musculoskeletal:      Lumbar back: Normal.   Skin:     General: Skin is warm and dry.      Capillary Refill: Capillary refill takes less than 2 seconds.   Neurological:      Mental Status: She is alert and oriented to person, place, and time.   Psychiatric:         Behavior: Behavior normal. Behavior is cooperative.         Thought Content: Thought content normal.           Assessment /Associated Orders:      1. Acute cystitis with hematuria  Urine Culture    nitrofurantoin (MACROBID) 100 MG Cap   2. Dysuria  phenazopyridine (PYRIDIUM) 200 MG Tab   3. Urinary frequency         Medical Decision Making:    Pt is clinically stable at today's acute urgent care visit.  No acute distress noted. Appropriate for outpatient management at this time.   UA: suggestive of acute cystitis with hematuria today.   Educated in proper administration of medication(s) ordered today including safety, possible SE, risks, benefits, rationale and alternatives to therapy.   Keep well hydrated - push fluids  OTC  analgesic of choice (acetaminophen or NSAID). Follow manufactures dosing and safety precautions.     Urine culture: pending   EMR review: previous UTI in 2019. No hx of frequent UTI or antibiotic use.       Advised the patient to " follow-up with the primary care provider for recheck, reevaluation, and consideration of further management if necessary.   Discussed management options (risks,benefits, and alternatives to treatment). Pt expresses understanding and the treatment plan was agreed upon. Questions were encouraged and answered       Return to urgent care prn if new or worsening sx or if there is no improvement in condition prn . Red flags discussed and indications to immediately call 911 or present to the Emergency Department.     I personally reviewed prior external notes and test results pertinent to today's visit.  I have independently reviewed and interpreted all diagnostics ordered during this urgent care acute visit.   Time spent evaluating this patient was at least 30 minutes and includes preparing for visit, counseling/education, exam and evaluation, obtaining history, independent interpretation, ordering lab/test/procedures and medication management.

## 2021-04-22 ENCOUNTER — IMMUNIZATION (OUTPATIENT)
Dept: FAMILY PLANNING/WOMEN'S HEALTH CLINIC | Facility: IMMUNIZATION CENTER | Age: 66
End: 2021-04-22
Attending: INTERNAL MEDICINE
Payer: OTHER GOVERNMENT

## 2021-04-22 DIAGNOSIS — Z23 ENCOUNTER FOR VACCINATION: Primary | ICD-10-CM

## 2021-04-22 LAB
BACTERIA UR CULT: ABNORMAL
BACTERIA UR CULT: ABNORMAL
SIGNIFICANT IND 70042: ABNORMAL
SITE SITE: ABNORMAL
SOURCE SOURCE: ABNORMAL

## 2021-04-22 PROCEDURE — 0002A PFIZER SARS-COV-2 VACCINE: CPT

## 2021-04-22 PROCEDURE — 91300 PFIZER SARS-COV-2 VACCINE: CPT

## 2021-04-28 ENCOUNTER — HOSPITAL ENCOUNTER (OUTPATIENT)
Facility: MEDICAL CENTER | Age: 66
End: 2021-04-28
Attending: PHYSICIAN ASSISTANT
Payer: OTHER GOVERNMENT

## 2021-04-28 ENCOUNTER — OFFICE VISIT (OUTPATIENT)
Dept: URGENT CARE | Facility: PHYSICIAN GROUP | Age: 66
End: 2021-04-28
Payer: OTHER GOVERNMENT

## 2021-04-28 VITALS
RESPIRATION RATE: 16 BRPM | HEIGHT: 63 IN | DIASTOLIC BLOOD PRESSURE: 78 MMHG | OXYGEN SATURATION: 91 % | HEART RATE: 103 BPM | BODY MASS INDEX: 29.59 KG/M2 | WEIGHT: 167 LBS | TEMPERATURE: 97.8 F | SYSTOLIC BLOOD PRESSURE: 124 MMHG

## 2021-04-28 DIAGNOSIS — N30.90 RECURRENT CYSTITIS: ICD-10-CM

## 2021-04-28 DIAGNOSIS — N30.00 ACUTE CYSTITIS WITHOUT HEMATURIA: ICD-10-CM

## 2021-04-28 DIAGNOSIS — N30.00 ACUTE CYSTITIS WITHOUT HEMATURIA: Primary | ICD-10-CM

## 2021-04-28 LAB
APPEARANCE UR: NORMAL
BILIRUB UR STRIP-MCNC: NORMAL MG/DL
COLOR UR AUTO: YELLOW
GLUCOSE UR STRIP.AUTO-MCNC: NORMAL MG/DL
KETONES UR STRIP.AUTO-MCNC: NORMAL MG/DL
LEUKOCYTE ESTERASE UR QL STRIP.AUTO: NORMAL
NITRITE UR QL STRIP.AUTO: NORMAL
PH UR STRIP.AUTO: 7 [PH] (ref 5–8)
PROT UR QL STRIP: NORMAL MG/DL
RBC UR QL AUTO: NORMAL
SP GR UR STRIP.AUTO: 1.01
UROBILINOGEN UR STRIP-MCNC: 0.2 MG/DL

## 2021-04-28 PROCEDURE — 87086 URINE CULTURE/COLONY COUNT: CPT

## 2021-04-28 PROCEDURE — 81002 URINALYSIS NONAUTO W/O SCOPE: CPT | Performed by: PHYSICIAN ASSISTANT

## 2021-04-28 PROCEDURE — 99214 OFFICE O/P EST MOD 30 MIN: CPT | Performed by: PHYSICIAN ASSISTANT

## 2021-04-28 RX ORDER — SULFAMETHOXAZOLE AND TRIMETHOPRIM 800; 160 MG/1; MG/1
1 TABLET ORAL EVERY 12 HOURS
Qty: 10 TABLET | Refills: 0 | Status: SHIPPED | OUTPATIENT
Start: 2021-04-28 | End: 2021-05-03

## 2021-04-28 RX ORDER — PHENAZOPYRIDINE HYDROCHLORIDE 200 MG/1
200 TABLET, FILM COATED ORAL 3 TIMES DAILY
Qty: 6 TABLET | Refills: 0 | Status: SHIPPED | OUTPATIENT
Start: 2021-04-28 | End: 2021-04-30

## 2021-04-28 ASSESSMENT — ENCOUNTER SYMPTOMS
NAUSEA: 0
VOMITING: 0
FLANK PAIN: 0
CHILLS: 0

## 2021-04-28 NOTE — PROGRESS NOTES
Subjective:   Penelope Bautista is a 65 y.o. female who presents for Bladder Problem (x2 days, Pt states bladder infection, burning, frequent urination )        Dysuria   This is a recurrent problem. The problem has been unchanged. The quality of the pain is described as burning. There has been no fever. There is no history of pyelonephritis. Associated symptoms include frequency, hesitancy and urgency. Pertinent negatives include no chills, discharge, flank pain, nausea or vomiting. Her past medical history is significant for kidney stones.      The patient was seen initially on 4/20/2021 treated for acute cystitis with Macrobid.  Her urine culture later showed positive E. coli growth with sensitivity to Macrobid.  She completed full course of medication and symptoms seem to improve for a couple days but recurred 2 days ago.      Review of Systems   Constitutional: Negative for chills.   Gastrointestinal: Negative for nausea and vomiting.   Genitourinary: Positive for dysuria, frequency, hesitancy and urgency. Negative for flank pain.       PMH:  has a past medical history of Anxiety, Arthritis, Asthma, CATARACT, Chronic lumbar pain, Depression, endogenous (HCC), and Labral tear of hip joint.  MEDS:   Current Outpatient Medications:   •  sulfamethoxazole-trimethoprim (BACTRIM DS) 800-160 MG tablet, Take 1 tablet by mouth every 12 hours for 5 days., Disp: 10 tablet, Rfl: 0  •  phenazopyridine (PYRIDIUM) 200 MG Tab, Take 1 tablet by mouth 3 times a day for 2 days., Disp: 6 tablet, Rfl: 0  •  phentermine (ADIPEX-P) 37.5 MG tablet, TAKE ONE TABLET BY MOUTH EVERY MORNING BEFORE BREAKFAST, Disp: 30 tablet, Rfl: 0  •  [START ON 5/10/2021] HYDROcodone-acetaminophen (NORCO) 7.5-325 MG per tablet, Take 1 tablet by mouth 2 times a day as needed for up to 30 days., Disp: 60 tablet, Rfl: 0  •  HYDROcodone-acetaminophen (NORCO) 7.5-325 MG per tablet, Take 1 tablet by mouth 2 times a day as needed for up to 30 days., Disp: 60  "tablet, Rfl: 0  •  QUEtiapine (SEROQUEL) 50 MG tablet, TAKE ONE TABLET BY MOUTH EVERY EVENING, Disp: 90 Tab, Rfl: 3  •  ADVAIR DISKUS 250-50 MCG/DOSE AEROSOL POWDER, BREATH ACTIVATED, USE 1 INHALATION TWICE A DAY, Disp: 180 Each, Rfl: 3  •  Olanzapine-Fluoxetine HCl 12-25 MG Cap, Take 1 Tab by mouth every day., Disp: 90 Cap, Rfl: 3  •  gabapentin (NEURONTIN) 300 MG Cap, Take 1-2 Caps by mouth every evening., Disp: 180 Cap, Rfl: 3  •  propranolol (INDERAL) 10 MG Tab, TAKE 1 TABLET DAILY FOR PALPITATIONS AND SLEEP, Disp: 90 Tab, Rfl: 3  •  albuterol (PROAIR HFA) 108 (90 Base) MCG/ACT Aero Soln inhalation aerosol, Inhale 2 Puffs by mouth every 6 hours as needed. Indications: Asthma, Disp: 3 Inhaler, Rfl: 3  ALLERGIES:   Allergies   Allergen Reactions   • Ceclor [Cefaclor] Swelling     SURGHX:   Past Surgical History:   Procedure Laterality Date   • KNEE ARTHROPLASTY TOTAL  2/11/2014    Performed by Kushal Rivera M.D. at [facility]   • APPENDECTOMY     • CATARACT EXTRACTION WITH IOL      bilateral   • GADIEL BY LAPAROSCOPY     • GYN SURGERY      tubal   • OTHER      facial lift - 2016 - Dr. Christianne REYNOLDS:  reports that she has been smoking cigarettes. She has a 17.00 pack-year smoking history. She has never used smokeless tobacco. She reports that she does not drink alcohol and does not use drugs.  Family History   Problem Relation Age of Onset   • Cancer Paternal Uncle         Colon CA   • Other Mother         dementia   • Cancer Paternal Aunt         colon cancer    • Cancer Paternal Uncle         colon cancer         Objective:   /78   Pulse (!) 103   Temp 36.6 °C (97.8 °F) (Temporal)   Resp 16   Ht 1.6 m (5' 3\")   Wt 75.8 kg (167 lb)   SpO2 91%   BMI 29.58 kg/m²     Physical Exam  Vitals reviewed.   Constitutional:       General: She is not in acute distress.     Appearance: She is well-developed.   HENT:      Head: Normocephalic and atraumatic.      Right Ear: External ear normal. "      Left Ear: External ear normal.      Nose: Nose normal.      Mouth/Throat:      Mouth: Mucous membranes are moist.   Eyes:      Conjunctiva/sclera: Conjunctivae normal.      Pupils: Pupils are equal, round, and reactive to light.   Neck:      Trachea: No tracheal deviation.   Cardiovascular:      Rate and Rhythm: Normal rate and regular rhythm.   Pulmonary:      Effort: Pulmonary effort is normal.      Breath sounds: Normal breath sounds.   Abdominal:      General: There is no distension.      Tenderness: There is no right CVA tenderness or left CVA tenderness.   Musculoskeletal:      Cervical back: Normal range of motion and neck supple.   Skin:     General: Skin is warm and dry.      Capillary Refill: Capillary refill takes less than 2 seconds.   Neurological:      General: No focal deficit present.      Mental Status: She is alert and oriented to person, place, and time.   Psychiatric:         Mood and Affect: Mood normal.         Behavior: Behavior normal.           Assessment/Plan:     1. Acute cystitis without hematuria  sulfamethoxazole-trimethoprim (BACTRIM DS) 800-160 MG tablet    phenazopyridine (PYRIDIUM) 200 MG Tab    Urine Culture    POCT Urinalysis   2. Recurrent cystitis       UA: Positive leukocytes    Supportive care reviewed.  We will start Bactrim twice a day for 5 days based on urine culture results obtained on 4/20/2021.  Continue to monitor symptoms closely.  Urine culture was obtained and she will be notified of final urine culture results are not susceptible to Bactrim prescribed at today's visit.    Follow-up with primary care provider.  If symptoms worsen or persist patient can return to clinic for reevaluation. Side effects of medication discussed. Patient confirmed understanding of information.    Please note that this dictation was created using voice recognition software. I have made every reasonable attempt to correct obvious errors, but I expect that there are errors of grammar  and possibly content that I did not discover before finalizing the note.

## 2021-04-30 LAB
BACTERIA UR CULT: NORMAL
SIGNIFICANT IND 70042: NORMAL
SITE SITE: NORMAL
SOURCE SOURCE: NORMAL

## 2021-05-02 DIAGNOSIS — N30.01 ACUTE CYSTITIS WITH HEMATURIA: ICD-10-CM

## 2021-05-02 DIAGNOSIS — N39.0 FREQUENT UTI: Primary | ICD-10-CM

## 2021-05-02 RX ORDER — SULFAMETHOXAZOLE AND TRIMETHOPRIM 800; 160 MG/1; MG/1
1 TABLET ORAL 2 TIMES DAILY
Qty: 6 TABLET | Refills: 0 | Status: SHIPPED | OUTPATIENT
Start: 2021-05-02 | End: 2021-05-05

## 2021-05-02 NOTE — PROGRESS NOTES
Spoke with patient.  She is still experiencing some dysuria.  She continues to take Azo.  We will extend antibiotic course for additional 3 days based on previous urine culture results.  A referral was placed to follow-up with urology.  If her symptoms worsen or persist she can return to urgent care for reevaluation.

## 2021-05-13 DIAGNOSIS — E66.3 OVERWEIGHT: ICD-10-CM

## 2021-05-13 RX ORDER — PHENTERMINE HYDROCHLORIDE 37.5 MG/1
TABLET ORAL
Qty: 30 TABLET | Refills: 0 | Status: SHIPPED | OUTPATIENT
Start: 2021-05-13 | End: 2021-06-08 | Stop reason: SDUPTHER

## 2021-05-13 NOTE — TELEPHONE ENCOUNTER
Received request via: Pharmacy    Was the patient seen in the last year in this department? Yes  3/11/2021  Does the patient have an active prescription (recently filled or refills available) for medication(s) requested? No

## 2021-06-08 ENCOUNTER — OFFICE VISIT (OUTPATIENT)
Dept: MEDICAL GROUP | Facility: LAB | Age: 66
End: 2021-06-08
Payer: OTHER GOVERNMENT

## 2021-06-08 VITALS
OXYGEN SATURATION: 93 % | DIASTOLIC BLOOD PRESSURE: 68 MMHG | RESPIRATION RATE: 16 BRPM | SYSTOLIC BLOOD PRESSURE: 112 MMHG | BODY MASS INDEX: 29.41 KG/M2 | HEART RATE: 94 BPM | HEIGHT: 63 IN | WEIGHT: 166 LBS | TEMPERATURE: 97.7 F

## 2021-06-08 DIAGNOSIS — M54.42 CHRONIC BILATERAL LOW BACK PAIN WITH LEFT-SIDED SCIATICA: ICD-10-CM

## 2021-06-08 DIAGNOSIS — G89.29 CHRONIC BILATERAL LOW BACK PAIN WITH LEFT-SIDED SCIATICA: ICD-10-CM

## 2021-06-08 DIAGNOSIS — M48.062 SPINAL STENOSIS OF LUMBAR REGION WITH NEUROGENIC CLAUDICATION: ICD-10-CM

## 2021-06-08 DIAGNOSIS — M25.552 CHRONIC LEFT HIP PAIN: ICD-10-CM

## 2021-06-08 DIAGNOSIS — N39.0 RECURRENT UTI: ICD-10-CM

## 2021-06-08 DIAGNOSIS — G89.29 CHRONIC LEFT HIP PAIN: ICD-10-CM

## 2021-06-08 DIAGNOSIS — E66.3 OVERWEIGHT: ICD-10-CM

## 2021-06-08 DIAGNOSIS — F11.20 OPIOID TYPE DEPENDENCE, CONTINUOUS (HCC): ICD-10-CM

## 2021-06-08 PROCEDURE — 99214 OFFICE O/P EST MOD 30 MIN: CPT | Performed by: NURSE PRACTITIONER

## 2021-06-08 RX ORDER — PHENTERMINE HYDROCHLORIDE 37.5 MG/1
TABLET ORAL
Qty: 30 TABLET | Refills: 0 | Status: SHIPPED | OUTPATIENT
Start: 2021-06-08 | End: 2021-07-12

## 2021-06-08 RX ORDER — HYDROCODONE BITARTRATE AND ACETAMINOPHEN 7.5; 325 MG/1; MG/1
1 TABLET ORAL 2 TIMES DAILY PRN
Qty: 60 TABLET | Refills: 0 | Status: SHIPPED | OUTPATIENT
Start: 2021-06-09 | End: 2021-07-09 | Stop reason: SDUPTHER

## 2021-06-08 NOTE — PROGRESS NOTES
Chief Complaint   Patient presents with   • Chronic Opiate Therapy       HPI  Penelope is a 64 yo est female here to f/u on chronic back /left hip pain.  Last MRI 2018:  IMPRESSION:     1.  No significant spinal canal narrowing     2.  Multilevel foraminal stenoses     3.  Multilevel annular disc bulge, facet arthropathy, and disc protrusion     4.  Mild levoconvex scoliosis     5.  Degenerative subluxation at L3-4     6.  Mild edematous endplate change adjacent to L2-3    Pain is a chronic issue.  She suffers from chronic pain in left low back and left hip.  Went to PT and Ortho 2019 /2020 re: tear in left hip and can't recall outcome of ortho consult.  Taking 2 pain pills per day which she states controls her pain.  She did see [facility] [place] 2019 and declined any sort of injections/interventions.   Last pain pill was a few d ago (d ago - able to skip a few days here and there if pain is lower than normal).    Leaving on a trip this Friday and returns a week later.    Continues on phentermine as well for appetite control.  She  reports no history of alcohol use.  She  reports no history of drug use.    Any major change in health since last appointment? No     Consequences of Chronic Opiate therapy:  (5 A's)  Analgesia: Compared to no treatment or prior treatment, pain is currently not changed  Activity: not changed  Adverse Events: She denies constipation, dry mouth, itchy skin, nausea and sedation  Aberrant Behaviors: She reports she is not taking medication as prescribed and is not veering from agreed treatment regimen or provider recommendations. There have been no inappropriate refills or lost/stolen meds reported.  Affect/Mood: Pain is not impacting patient's mood.  Patient denies depression/anxiety.     Nonnarcotic treatments that are being used: Gabapentin.         Opioid Risk Score: 2     Interpretation of Opioid Risk Score   Score 0-3 = Low risk of abuse. Do UDS at least once per year.  Score 4-7 = Moderate risk  "of abuse. Do UDS 1-4 times per year.  Score 8+ = High risk of abuse. Refer to specialist.     Urine drug screen and controlled substance agreement are up-to-date from June 2020    #2- recurrent UTI:  Suffered from UTI symptoms most of the month of April and ended up at urology.  Will f/u with urology next month for further testing.  Was given a premarin cream rx but it's not ready at her pharmacy.      Past medical, surgical, family, and social history is reviewed and updated in Epic chart by me today.   Medications and allergies reviewed and updated in Epic chart by me today.     ROS:   As documented in history of present illness above    Exam:  /68 (BP Location: Left arm, Patient Position: Sitting, BP Cuff Size: Large adult)   Pulse 94   Temp 36.5 °C (97.7 °F)   Resp 16   Ht 1.6 m (5' 3\")   Wt 75.3 kg (166 lb)   SpO2 93%   Gen. appears healthy in no distress   Skin appropriate for sex and age   Neck trachea is midline  Lungs unlabored breathing  Heart regular rate  Neuro gait and station normal   Psych appropriate, calm, interactive, well-groomed    Assessment / Plan / Medical Decision making:   \"  1. Spinal stenosis of lumbar region with neurogenic claudication  HYDROcodone-acetaminophen (NORCO) 7.5-325 MG per tablet   2. Opioid type dependence, continuous (HCC)  Consent for Opiate Prescription   3. Chronic bilateral low back pain with left-sided sciatica  DX-LUMBAR SPINE-2 OR 3 VIEWS   4. Overweight  phentermine (ADIPEX-P) 37.5 MG tablet   5. Chronic left hip pain  HYDROcodone-acetaminophen (NORCO) 7.5-325 MG per tablet   \"  In prescribing controlled substances to this patient, I certify that I have obtained and reviewed the medical history of Penelope Bautista. I have also made a good abelardo effort to obtain applicable records from other providers who have treated the patient and records did not demonstrate any increased risk of substance abuse that would prevent me from prescribing controlled " substances.     I have conducted a physical exam and documented it. I have reviewed Ms. Bautista’s prescription history as maintained by the [place] Prescription Monitoring Program.     I have assessed the patient’s risk for abuse, dependency, and addiction using the validated Opioid Risk Tool available at https://www.[employer].com/gwdhux-syrc-wehz-ort-narcotic-abuse.     Given the above, I believe the benefits of controlled substance therapy outweigh the risks. The reasons for prescribing controlled substances include non-narcotic, oral analgesic alternatives have been inadequate for pain control. Accordingly, I have discussed the risk and benefits, treatment plan, and alternative therapies with the patient.     Patient was unable to leave a urine sample today for UDS and was only given 1 prescription, she understands that she needs to return within the next 30 days to leave a urine drug screen, at which point I will send through 2 more prescriptions to her pharmacy.    Updated controlled substance agreement today.    Reviewed recent urgent care visits and she informed me about her urology visit.  We discussed staying well-hydrated and avoiding holding her urine for long periods of time/limiting her caffeine.    Also reviewed last consult from [facility] [place] with her and encouraged to return there if she becomes interested in interventions.    She has tolerated phentermine for several years, started by her previous primary care.  Encouraged her to stop phentermine if she begins to have heart palpitations.  Her blood pressure is well controlled.

## 2021-06-30 ENCOUNTER — NON-PROVIDER VISIT (OUTPATIENT)
Dept: MEDICAL GROUP | Facility: LAB | Age: 66
End: 2021-06-30
Payer: OTHER GOVERNMENT

## 2021-06-30 DIAGNOSIS — Z79.891 CHRONIC USE OF OPIATE FOR THERAPEUTIC PURPOSE: ICD-10-CM

## 2021-06-30 NOTE — PROGRESS NOTES
Penelope Bautista is a 65 y.o. female here for a non-provider visit for a UDS    If abnormal was an in office provider notified today (if so, indicate provider)? No    Routed to PCP? Yes

## 2021-07-08 DIAGNOSIS — Z79.891 CHRONIC USE OF OPIATE FOR THERAPEUTIC PURPOSE: ICD-10-CM

## 2021-07-09 ENCOUNTER — PATIENT MESSAGE (OUTPATIENT)
Dept: MEDICAL GROUP | Facility: LAB | Age: 66
End: 2021-07-09

## 2021-07-09 DIAGNOSIS — G89.29 CHRONIC LEFT HIP PAIN: ICD-10-CM

## 2021-07-09 DIAGNOSIS — M48.062 SPINAL STENOSIS OF LUMBAR REGION WITH NEUROGENIC CLAUDICATION: ICD-10-CM

## 2021-07-09 DIAGNOSIS — M25.552 CHRONIC LEFT HIP PAIN: ICD-10-CM

## 2021-07-09 NOTE — TELEPHONE ENCOUNTER
From: Penelope Bautista  To: Nurse Practitioner Laura Benjamin  Sent: 7/9/2021 2:59 PM PDT  Subject: Test Result Question    Hi  I was wondering what you found out about my results on my urine test?  Thank you   Peneloep

## 2021-07-11 DIAGNOSIS — E66.3 OVERWEIGHT: ICD-10-CM

## 2021-07-12 RX ORDER — HYDROCODONE BITARTRATE AND ACETAMINOPHEN 7.5; 325 MG/1; MG/1
1 TABLET ORAL 2 TIMES DAILY PRN
Qty: 60 TABLET | Refills: 0 | Status: SHIPPED | OUTPATIENT
Start: 2021-09-07 | End: 2021-10-27 | Stop reason: SDUPTHER

## 2021-07-12 RX ORDER — PHENTERMINE HYDROCHLORIDE 37.5 MG/1
TABLET ORAL
Qty: 30 TABLET | Refills: 0 | Status: SHIPPED | OUTPATIENT
Start: 2021-07-12 | End: 2023-08-17 | Stop reason: SDUPTHER

## 2021-07-12 RX ORDER — HYDROCODONE BITARTRATE AND ACETAMINOPHEN 7.5; 325 MG/1; MG/1
1 TABLET ORAL 2 TIMES DAILY PRN
Qty: 60 TABLET | Refills: 0 | Status: SHIPPED | OUTPATIENT
Start: 2021-08-08 | End: 2021-10-27 | Stop reason: SDUPTHER

## 2021-07-12 RX ORDER — HYDROCODONE BITARTRATE AND ACETAMINOPHEN 7.5; 325 MG/1; MG/1
1 TABLET ORAL 2 TIMES DAILY PRN
Qty: 60 TABLET | Refills: 0 | Status: SHIPPED | OUTPATIENT
Start: 2021-07-12 | End: 2021-10-27 | Stop reason: SDUPTHER

## 2021-07-19 ENCOUNTER — HOSPITAL ENCOUNTER (OUTPATIENT)
Dept: RADIOLOGY | Facility: MEDICAL CENTER | Age: 66
End: 2021-07-19
Attending: NURSE PRACTITIONER
Payer: OTHER GOVERNMENT

## 2021-07-19 DIAGNOSIS — G89.29 CHRONIC BILATERAL LOW BACK PAIN WITH LEFT-SIDED SCIATICA: ICD-10-CM

## 2021-07-19 DIAGNOSIS — Z12.31 ENCOUNTER FOR MAMMOGRAM TO ESTABLISH BASELINE MAMMOGRAM: ICD-10-CM

## 2021-07-19 DIAGNOSIS — M54.42 CHRONIC BILATERAL LOW BACK PAIN WITH LEFT-SIDED SCIATICA: ICD-10-CM

## 2021-07-19 PROCEDURE — 77063 BREAST TOMOSYNTHESIS BI: CPT

## 2021-07-19 PROCEDURE — 72100 X-RAY EXAM L-S SPINE 2/3 VWS: CPT

## 2021-08-12 DIAGNOSIS — J45.40 MODERATE PERSISTENT ASTHMA WITHOUT COMPLICATION: ICD-10-CM

## 2021-08-12 NOTE — TELEPHONE ENCOUNTER
Received request via: Pharmacy    Was the patient seen in the last year in this department? Yes  6/8/21  Does the patient have an active prescription (recently filled or refills available) for medication(s) requested? No

## 2021-08-13 RX ORDER — ALBUTEROL SULFATE 90 UG/1
2 AEROSOL, METERED RESPIRATORY (INHALATION) EVERY 6 HOURS PRN
Qty: 3 EACH | Refills: 2 | Status: SHIPPED | OUTPATIENT
Start: 2021-08-13 | End: 2022-03-30 | Stop reason: SDUPTHER

## 2021-08-16 ENCOUNTER — APPOINTMENT (OUTPATIENT)
Dept: URGENT CARE | Facility: PHYSICIAN GROUP | Age: 66
End: 2021-08-16
Payer: OTHER GOVERNMENT

## 2021-08-18 DIAGNOSIS — F51.01 PRIMARY INSOMNIA: ICD-10-CM

## 2021-08-18 RX ORDER — PROPRANOLOL HYDROCHLORIDE 10 MG/1
TABLET ORAL
Qty: 90 TABLET | Refills: 3 | Status: SHIPPED | OUTPATIENT
Start: 2021-08-18 | End: 2021-10-04

## 2021-08-23 ENCOUNTER — OFFICE VISIT (OUTPATIENT)
Dept: MEDICAL GROUP | Facility: LAB | Age: 66
End: 2021-08-23
Payer: OTHER GOVERNMENT

## 2021-08-23 ENCOUNTER — HOSPITAL ENCOUNTER (OUTPATIENT)
Facility: MEDICAL CENTER | Age: 66
End: 2021-08-23
Attending: NURSE PRACTITIONER
Payer: OTHER GOVERNMENT

## 2021-08-23 VITALS
BODY MASS INDEX: 30.65 KG/M2 | HEIGHT: 63 IN | WEIGHT: 173 LBS | OXYGEN SATURATION: 89 % | TEMPERATURE: 98.7 F | DIASTOLIC BLOOD PRESSURE: 70 MMHG | HEART RATE: 104 BPM | RESPIRATION RATE: 16 BRPM | SYSTOLIC BLOOD PRESSURE: 110 MMHG

## 2021-08-23 DIAGNOSIS — J06.9 ACUTE URI: ICD-10-CM

## 2021-08-23 DIAGNOSIS — Z11.59 SCREENING FOR VIRAL DISEASE: ICD-10-CM

## 2021-08-23 PROCEDURE — U0003 INFECTIOUS AGENT DETECTION BY NUCLEIC ACID (DNA OR RNA); SEVERE ACUTE RESPIRATORY SYNDROME CORONAVIRUS 2 (SARS-COV-2) (CORONAVIRUS DISEASE [COVID-19]), AMPLIFIED PROBE TECHNIQUE, MAKING USE OF HIGH THROUGHPUT TECHNOLOGIES AS DESCRIBED BY CMS-2020-01-R: HCPCS

## 2021-08-23 PROCEDURE — U0005 INFEC AGEN DETEC AMPLI PROBE: HCPCS

## 2021-08-23 PROCEDURE — 99213 OFFICE O/P EST LOW 20 MIN: CPT | Performed by: NURSE PRACTITIONER

## 2021-08-23 RX ORDER — PREDNISONE 20 MG/1
40 TABLET ORAL DAILY
Qty: 10 TABLET | Refills: 0 | Status: SHIPPED | OUTPATIENT
Start: 2021-08-23 | End: 2021-08-28

## 2021-08-23 NOTE — PROGRESS NOTES
"CC  URI    HPI   Penelope is a 67 yo est female here with c/o URI symptoms.  Onset symptoms 8/18/2021 with fever, SOB, wheezing, coughing, congestion, ST, fatigue, body, low appetite.  Symptoms are stable.  + diarrhea - \"a little.\"  Denies n/v.    Has tried OTC advil with a little bit of improvement.  Using her albuterol inhaler / nebulizer which helps.    Vaccinated against covid since late April 2021.    Smoker.     Past medical, surgical, family, and social history is reviewed and updated in Epic chart by me today.   Medications and allergies reviewed and updated in Epic chart by me today.     ROS:   Denies n/v/d or abdominal pain.     Exam:  /70 (BP Location: Left arm, Patient Position: Sitting, BP Cuff Size: Adult)   Pulse (!) 104   Temp 37.1 °C (98.7 °F)   Resp 16   Ht 1.6 m (5' 3\")   Wt 78.5 kg (173 lb)   SpO2 89%     Constitutional: Alert, no distress, plus 3 vital signs.  She is easily able to walk out of the office without any problems.  Skin:  Warm, dry, no rashes invisible areas  Eye: Equal, round and reactive, conjunctiva clear  ENMT: Bilateral tympanic membranes are retracted but translucent without erythema or perforation. no sinus tenderness. Oropharynx is slightly injected without exudate. No tonsillar enlargement.    Neck: Mild anterior cervical, nontender lymphadenopathy  Respiratory: She does have expiratory wheezing to her upper lobes and decreased lung sounds to her right lower lobe.  No increased work of breathing, she is able to speak in a full sentence.  Cardiovascular: Normal rate and rhythm  Psych: Alert, pleasant, well-groomed, normal affect      A/P:  \"  1. Screening for viral disease  SARS-CoV-2 PCR (24 hour In-House): Collect NP swab in [facility]   2. Acute URI  predniSONE (DELTASONE) 20 MG Tab   \"  Positive exposure to Covid,  tested positive approximately 15 days ago.  Likely has pneumonia.  Fortunately vaccinated.  Started on prednisone 40 mg daily for the next 5 days and " encouraged her to contact me in 48 hours regarding how she is feeling.  Continues on albuterol 1 to 2 puffs every 4 hours or her nebulizer machine, giving herself 1 treatment every 4 hours as needed for shortness of breath or wheezing.  We discussed deep breathing exercises.  Discussed the importance of staying well-hydrated.  She was given strict ER precautions with temperatures greater than 102.5 or overall worsening symptoms.  Covid tested today.  She is on day 6 of symptoms and will continue to quarantine for the next 4 days.      I did wear proper PPE with goggles, gloves, N95 and gown the entire time I was in the room with the patient.

## 2021-08-24 DIAGNOSIS — Z11.59 SCREENING FOR VIRAL DISEASE: ICD-10-CM

## 2021-08-24 LAB
COVID ORDER STATUS COVID19: NORMAL
SARS-COV-2 RNA RESP QL NAA+PROBE: NOTDETECTED
SPECIMEN SOURCE: NORMAL

## 2021-08-28 ENCOUNTER — HOSPITAL ENCOUNTER (OUTPATIENT)
Dept: RADIOLOGY | Facility: MEDICAL CENTER | Age: 66
End: 2021-08-28
Attending: NURSE PRACTITIONER
Payer: OTHER GOVERNMENT

## 2021-08-28 ENCOUNTER — OFFICE VISIT (OUTPATIENT)
Dept: URGENT CARE | Facility: PHYSICIAN GROUP | Age: 66
End: 2021-08-28
Payer: OTHER GOVERNMENT

## 2021-08-28 VITALS
DIASTOLIC BLOOD PRESSURE: 58 MMHG | SYSTOLIC BLOOD PRESSURE: 100 MMHG | TEMPERATURE: 98.6 F | OXYGEN SATURATION: 84 % | RESPIRATION RATE: 16 BRPM | BODY MASS INDEX: 30.12 KG/M2 | HEART RATE: 111 BPM | WEIGHT: 170 LBS | HEIGHT: 63 IN

## 2021-08-28 DIAGNOSIS — R05.9 COUGH: ICD-10-CM

## 2021-08-28 DIAGNOSIS — R07.81 RIB PAIN: ICD-10-CM

## 2021-08-28 DIAGNOSIS — R09.02 HYPOXIA: ICD-10-CM

## 2021-08-28 DIAGNOSIS — R06.02 SOB (SHORTNESS OF BREATH): ICD-10-CM

## 2021-08-28 PROCEDURE — 99215 OFFICE O/P EST HI 40 MIN: CPT | Performed by: NURSE PRACTITIONER

## 2021-08-28 PROCEDURE — 71100 X-RAY EXAM RIBS UNI 2 VIEWS: CPT | Mod: RT

## 2021-08-28 PROCEDURE — 71046 X-RAY EXAM CHEST 2 VIEWS: CPT

## 2021-08-28 ASSESSMENT — ENCOUNTER SYMPTOMS
COUGH: 1
SHORTNESS OF BREATH: 0
INSOMNIA: 1
FEVER: 1

## 2021-08-28 ASSESSMENT — VISUAL ACUITY: OU: 1

## 2021-08-28 NOTE — PROGRESS NOTES
Subjective:     Penelope Bautista is a 66 y.o. female who presents for Rib Pain (x right side pain, possible rib pain from coughing, pt states pneumonia )       Cough  This is a new problem. The problem has been gradually worsening. Associated symptoms include a fever and myalgias. Pertinent negatives include no shortness of breath.      Patient reports she saw her PCP last Thursday.  Diagnosed with pneumonia.  Started on prednisone for 5 days which she finished yesterday.    Records reviewed.  Patient saw PCP on 8/23/2021.  Diagnosed with likely pneumonia, perhaps related to COVID-19.  Patient had exposure to Covid.   had it.  Advised to continue on albuterol as well as self-isolation.    Patient continues to report a persistent productive cough.  Feels feverish.  Reports worsening right-sided flank pain and tenderness which worsens when taking a deep breath and coughing.  Unable to sleep well due to the coughing.    Patient was screened prior to rooming and denied COVID-19 diagnosis or contact with a person who has been diagnosed or is suspected to have COVID-19. During this visit, appropriate PPE was worn, hand hygiene was performed, and the patient and any visitors were masked.     PMH:  has a past medical history of Anxiety, Arthritis, Asthma, CATARACT, Chronic lumbar pain, Depression, endogenous (HCC), and Labral tear of hip joint.    MEDS:   Current Outpatient Medications:   •  propranolol (INDERAL) 10 MG Tab, TAKE 1 TABLET DAILY FOR PALPITATIONS AND SLEEP, Disp: 90 Tablet, Rfl: 3  •  albuterol (PROAIR HFA) 108 (90 Base) MCG/ACT Aero Soln inhalation aerosol, Inhale 2 Puffs every 6 hours as needed. Indications: Asthma, Disp: 3 Each, Rfl: 2  •  [START ON 9/7/2021] HYDROcodone-acetaminophen (NORCO) 7.5-325 MG per tablet, Take 1 tablet by mouth 2 times a day as needed for up to 30 days., Disp: 60 tablet, Rfl: 0  •  HYDROcodone-acetaminophen (NORCO) 7.5-325 MG per tablet, Take 1 tablet by mouth 2 times a  "day as needed for up to 30 days., Disp: 60 tablet, Rfl: 0  •  QUEtiapine (SEROQUEL) 50 MG tablet, TAKE ONE TABLET BY MOUTH EVERY EVENING, Disp: 90 Tab, Rfl: 3  •  ADVAIR DISKUS 250-50 MCG/DOSE AEROSOL POWDER, BREATH ACTIVATED, USE 1 INHALATION TWICE A DAY, Disp: 180 Each, Rfl: 3  •  Olanzapine-Fluoxetine HCl 12-25 MG Cap, Take 1 Tab by mouth every day., Disp: 90 Cap, Rfl: 3  •  gabapentin (NEURONTIN) 300 MG Cap, Take 1-2 Caps by mouth every evening., Disp: 180 Cap, Rfl: 3    ALLERGIES:   Allergies   Allergen Reactions   • Ceclor [Cefaclor] Swelling     SURGHX:   Past Surgical History:   Procedure Laterality Date   • KNEE ARTHROPLASTY TOTAL  2/11/2014    Performed by Kushal Rivera M.D. at [facility] ORS   • APPENDECTOMY     • CATARACT EXTRACTION WITH IOL      bilateral   • GADIEL BY LAPAROSCOPY     • GYN SURGERY      tubal   • OTHER      facial lift - 2016 - Dr. Faust     SOCHX:  reports that she has been smoking cigarettes. She has a 17.00 pack-year smoking history. She has never used smokeless tobacco. She reports that she does not drink alcohol and does not use drugs.     FH: Reviewed with patient, not pertinent to this visit.    Review of Systems   Constitutional: Positive for fever and malaise/fatigue.   Respiratory: Positive for cough. Negative for shortness of breath.    Genitourinary: Positive for flank pain.   Musculoskeletal: Positive for myalgias.   Psychiatric/Behavioral: The patient has insomnia.    All other systems reviewed and are negative.    Additional details per HPI.      Objective:     /58   Pulse (!) 111   Temp 37 °C (98.6 °F) (Temporal)   Resp 16   Ht 1.6 m (5' 3\")   Wt 77.1 kg (170 lb)   SpO2 (!) 84%   BMI 30.11 kg/m²     Physical Exam  Vitals reviewed.   Constitutional:       General: She is not in acute distress.     Appearance: She is well-developed. She is ill-appearing. She is not toxic-appearing.   HENT:      Head: Normocephalic.      Right Ear: External ear " normal.      Left Ear: External ear normal.   Eyes:      General: Vision grossly intact.      Extraocular Movements: Extraocular movements intact.      Conjunctiva/sclera: Conjunctivae normal.   Cardiovascular:      Rate and Rhythm: Regular rhythm. Tachycardia present.      Heart sounds: Normal heart sounds.   Pulmonary:      Effort: Pulmonary effort is normal. No respiratory distress.      Breath sounds: Normal breath sounds. No decreased breath sounds.   Chest:      Chest wall: Tenderness (Right lateral) present.   Abdominal:      Palpations: Abdomen is soft.      Tenderness: There is no abdominal tenderness.   Musculoskeletal:         General: No deformity. Normal range of motion.      Cervical back: Normal range of motion.   Skin:     General: Skin is warm and dry.      Coloration: Skin is not pale.   Neurological:      Mental Status: She is alert and oriented to person, place, and time.      Sensory: No sensory deficit.      Motor: No weakness.      Coordination: Coordination normal.   Psychiatric:         Behavior: Behavior normal. Behavior is cooperative.     Chest x-ray:    Details    Reading Physician Reading Date Result Priority   Elias Urena M.D.  [phone] 8/28/2021 Urgent Care   Narrative & Impression     8/28/2021 2:29 PM     HISTORY/REASON FOR EXAM: Cough.     TECHNIQUE/EXAM DESCRIPTION AND NUMBER OF VIEWS:  Two views of the chest.     COMPARISON:  2/5/2014.     FINDINGS:  Cardiomediastinal contours are moderately enlarged.     There is some right lung volume loss with moderate pleural fluid laterally and inferiorly. Normal lung aeration at the apex. There is some linear left basilar opacity     No left pleural space process is evident.     IMPRESSION:     Medium left enlargement likely a right pleural effusion with compressive atelectasis. Underlying consolidation or mass not excluded     Stable hyperinflation left lung with some new basilar atelectasis        Exam Ended: 08/28/21  2:31 PM  Last Resulted: 08/28/21  2:44 PM        Right rib x-ray:    Details    Reading Physician Reading Date Result Priority   Elias Urena M.D.  [phone] 8/28/2021 Urgent Care   Narrative & Impression     8/28/2021 2:29 PM     HISTORY/REASON FOR EXAM:  Rib pain on the right with cough     TECHNIQUE/EXAM DESCRIPTION AND NUMBER OF VIEWS:  3 images of the RIGHT ribs.     COMPARISON: 2:00 PM     FINDINGS:  No displaced fracture is seen.     No pneumothorax.     Moderate right pleural fluid likely is loculated     IMPRESSION:     Normal right rib series.        Exam Ended: 08/28/21  2:31 PM Last Resulted: 08/28/21  2:43 PM        Radiology report and images reviewed by myself. Concur with findings.      Assessment/Plan:     1. Cough  - DX-CHEST-2 VIEWS; Future    2. Rib pain  - [id number] (W/O CXR) RIGHT; Future    3. SOB (shortness of breath)    4. Hypoxia    During the rooming process, SpO2 was noted to be 84%.  Patient placed on 2 L of oxygen. SpO2 continues to remain unsatisfactory for discharge home.    Patient referred to the ER for further evaluation and management.  EMS declined.  Patient will go via POV to the nearby ER right away.    Differential diagnosis, natural history, supportive care, over-the-counter symptom management per 's instructions, close monitoring, and indications for immediate follow-up discussed.     All questions answered. Patient agrees with the plan of care.

## 2021-09-01 ASSESSMENT — ENCOUNTER SYMPTOMS
FLANK PAIN: 1
MYALGIAS: 1

## 2021-09-29 ENCOUNTER — HOSPITAL ENCOUNTER (OUTPATIENT)
Dept: RADIOLOGY | Facility: MEDICAL CENTER | Age: 66
End: 2021-09-29
Attending: SURGERY
Payer: OTHER GOVERNMENT

## 2021-09-29 DIAGNOSIS — J90 EXUDATIVE PLEURISY: ICD-10-CM

## 2021-09-29 PROCEDURE — 71046 X-RAY EXAM CHEST 2 VIEWS: CPT

## 2021-10-01 ENCOUNTER — TELEPHONE (OUTPATIENT)
Dept: MEDICAL GROUP | Facility: LAB | Age: 66
End: 2021-10-01

## 2021-10-01 NOTE — TELEPHONE ENCOUNTER
1. Caller Name: Anuradha Rascon                        Call Back Number: [phone]      How would the patient prefer to be contacted with a response: Phone call OK to leave a detailed message    Pt has been started on home health as of yesterday.  They are wanting to do 1 skilled nursing visit per week and as needed for 9 weeks.

## 2021-10-03 NOTE — TELEPHONE ENCOUNTER
Order will be placed tomorrow at her visit as I need to document need for home health for her insurance. thanks

## 2021-10-04 ENCOUNTER — OFFICE VISIT (OUTPATIENT)
Dept: MEDICAL GROUP | Facility: LAB | Age: 66
End: 2021-10-04
Payer: OTHER GOVERNMENT

## 2021-10-04 VITALS
DIASTOLIC BLOOD PRESSURE: 66 MMHG | RESPIRATION RATE: 16 BRPM | TEMPERATURE: 96.5 F | HEIGHT: 63 IN | HEART RATE: 100 BPM | WEIGHT: 161 LBS | SYSTOLIC BLOOD PRESSURE: 124 MMHG | OXYGEN SATURATION: 95 % | BODY MASS INDEX: 28.53 KG/M2

## 2021-10-04 DIAGNOSIS — J18.9 PNEUMONIA DUE TO INFECTIOUS ORGANISM, UNSPECIFIED LATERALITY, UNSPECIFIED PART OF LUNG: ICD-10-CM

## 2021-10-04 DIAGNOSIS — F51.01 PRIMARY INSOMNIA: ICD-10-CM

## 2021-10-04 DIAGNOSIS — Z09 HOSPITAL DISCHARGE FOLLOW-UP: ICD-10-CM

## 2021-10-04 DIAGNOSIS — D64.9 ANEMIA, UNSPECIFIED TYPE: ICD-10-CM

## 2021-10-04 DIAGNOSIS — J86.9 EMPYEMA (HCC): ICD-10-CM

## 2021-10-04 DIAGNOSIS — N17.9 ACUTE KIDNEY INJURY (HCC): ICD-10-CM

## 2021-10-04 DIAGNOSIS — R00.2 HEART PALPITATIONS: ICD-10-CM

## 2021-10-04 DIAGNOSIS — Z23 NEED FOR INFLUENZA VACCINATION: ICD-10-CM

## 2021-10-04 PROCEDURE — 99214 OFFICE O/P EST MOD 30 MIN: CPT | Mod: 25 | Performed by: NURSE PRACTITIONER

## 2021-10-04 PROCEDURE — 90686 IIV4 VACC NO PRSV 0.5 ML IM: CPT | Performed by: NURSE PRACTITIONER

## 2021-10-04 PROCEDURE — 90471 IMMUNIZATION ADMIN: CPT | Performed by: NURSE PRACTITIONER

## 2021-10-04 RX ORDER — PROPRANOLOL HYDROCHLORIDE 10 MG/1
10 TABLET ORAL 2 TIMES DAILY PRN
Qty: 60 TABLET | Refills: 5
Start: 2021-10-04 | End: 2022-06-23 | Stop reason: SDUPTHER

## 2021-10-04 RX ORDER — TRAZODONE HYDROCHLORIDE 50 MG/1
50 TABLET ORAL EVERY EVENING
Qty: 30 TABLET | Refills: 3
Start: 2021-10-04 | End: 2021-10-26 | Stop reason: SDUPTHER

## 2021-10-04 NOTE — PROGRESS NOTES
"[facility] f/u     HPI  Penelope is a 67 yo est female here for hospital follow-up.   She was initially admitted to [facility] on August 28 because of chest pain/shortness of breath and chest x-ray showed a large right pleural effusion, CT scan showed parenchymal mass right upper lung which ended up being a diagnoses of empyema.  On August 30 she had a right thoracotomy, complicated by chronic conditions of smoking and asthma - finally extubated on September 7, chest tube removed September 9 and treated with antibiotics through September 20.  Discharged to [facility] acute rehab on oxygen, oral augmentin for PNA / empyema.  She was a bit anemic on last CBC, 9/21 with H&H of 8.6/28.9.  Fortunately kidney function was back to normal upon last CMP 9/21 with BUN/creatinine of 7/0.53.    Since being home for the past 7 d, after one week of rehab, she feels very tired and weak.  Temp  typically.  Sleeping well with combination trazodone and seroquel. Taking 2 norco per day for pain - this is controlled.  Denies constipation.  Appetite is good.  Denies anxiety / panic.  Crying easily.  Denies feeling short of breath at rest and chest pain is gone.  Surgical incisions are well-healed.  Fortunately she stopped smoking!  On 2.5 L NC oxygen - oxygen 91-93 at home.   Home health OT, PT, RN coming - [facility].  Using walker for any distance walking - can get to bathroom / kitchen on her own.   She did see Dr. Minor 9/29/2021 and repeated CXR but hasn't heard anything from their office about results.      Past medical, surgical, family, and social history is reviewed and updated in Epic chart by me today.   Medications and allergies reviewed and updated in Epic chart by me today.     ROS:   As documented in history of present illness above    Exam:  /66 (BP Location: Right arm, Patient Position: Sitting, BP Cuff Size: Large adult)   Pulse 100   Temp 35.8 °C (96.5 °F)   Resp 16   Ht 1.6 m (5' 3\")   Wt 73 kg (161 " lb)   SpO2 95%     Constitutional: Alert, no distress, plus 3 vital signs  Skin:  Warm, dry, no rashes invisible areas  Eye: Equal, round and reactive, conjunctiva clear  Neck: Trachea midline, no masses, no thyromegaly  Respiratory: Unlabored respiration.  Right lower lobe lung sounds are certainly harder to hear than left but are present without wheezing or rhonchi.  Chest wall: She has a very well-healed incision without any openings/erythema or tenderness to right upper posterior trunk.  Cardiovascular: Normal rate and rhythm with the exception of frequent extrasystole, heard about every 4-5 beats -chronic for patient.  Abdomen: Soft, nontender.  Psych: Alert, pleasant, well-groomed, normal affect    Assessment / Plan / Medical Decision makin. Hospital discharge follow-up     2. Empyema (HCC)  CBC WITH DIFFERENTIAL    Comp Metabolic Panel   3. Pneumonia due to infectious organism, unspecified laterality, unspecified part of lung     4. Anemia, unspecified type  CBC WITH DIFFERENTIAL    Comp Metabolic Panel   5. Acute kidney injury (HCC)  CBC WITH DIFFERENTIAL    Comp Metabolic Panel   6. Primary insomnia  propranolol (INDERAL) 10 MG Tab   7. Need for influenza vaccination  INFLUENZA VACCINE QUAD INJ (PF)     Reviewed patient's hospitalization with her in depth including all paperwork from [facility].  Recommend repeat CBC and CMP, this will be drawn by her home health nurse at next visit.  She will continue oxygen therapy for now with hopes for weaning off of this over the next few weeks to months.  Discussed the importance of deep breathing exercises and incentive spirometer use.  Fortunately she has quit smoking!  Covid vaccines are up-to-date.  Influenza vaccine given today.  She is sleeping well with trazodone and has plenty.  Also pain is controlled with hydrocodone and she did not need a refill of this today.  I did restart her on propanolol to help with heart palpitations and  sleep.  Home health will continue to monitor vital signs including blood pressure and heart rate.  She is looking forward to working with home health physical therapy for strength/less weakness.  Reviewed chest x-ray ordered by her surgeon which shows improvement.  She will notify me after she hears back from her surgeon regarding his opinion of her chest x-ray results and recommendation for CT scan -this was done at [facility] and [facility] radiology may need to compare but again we will wait for her surgeon's opinion.    Include home health cert.  Face to Face Supporting Documentation - Home Health    The encounter with this patient was in whole or in part the primary reason for home health admission.    Date of encounter:   Patient:                    MRN:                       YOB: 2019  Penelope Bautista  [id number]  1955     Home health to see patient for:  Skilled Nursing care for assessment, interventions & education, Physical Therapy evaluation and treatment and Occupational therapy evaluation and treatment    Skilled need for:  Comment: VS checks.      Skilled nursing interventions to include:  Comment: vital sign monitoring d/t respiratory failure     Homebound status evidenced by:  Need the aid of supportive devices such as crutches, canes, wheelchairs or walkers or Needs the assistance of another person in order to leave the home. Leaving home requires a considerable and taxing effort. There is a normal inability to leave the home.    Community Physician to provide follow up care: SHIREEN Lorenzo     Optional Interventions? No unless needed      I certify the face to face encounter for this home health care referral meets the CMS requirements and the encounter/clinical assessment with the patient was, in whole, or in part, for the medical condition(s) listed above, which is the primary reason for home health care. Based on my clinical findings: the service(s) are  medically necessary, support the need for home health care, and the homebound criteria are met.  I certify that this patient has had a face to face encounter by myself.  CARMELLA Lorenzo. - NPI: [id number]

## 2021-10-08 ENCOUNTER — TELEPHONE (OUTPATIENT)
Dept: MEDICAL GROUP | Facility: LAB | Age: 66
End: 2021-10-08

## 2021-10-08 NOTE — TELEPHONE ENCOUNTER
"· Home Health paperwork received from [facility] requiring provider signature.     · All appropriate fields completed by Medical Assistant: N/A CMA printed and distributed to MA    · Paperwork placed in \"MA to Provider\" folder/basket. Awaiting provider completion/signature.  "

## 2021-10-12 ENCOUNTER — HOSPITAL ENCOUNTER (OUTPATIENT)
Facility: MEDICAL CENTER | Age: 66
End: 2021-10-12
Attending: NURSE PRACTITIONER
Payer: OTHER GOVERNMENT

## 2021-10-12 LAB
ALBUMIN SERPL BCP-MCNC: 3.7 G/DL (ref 3.2–4.9)
ALBUMIN/GLOB SERPL: 0.9 G/DL
ALP SERPL-CCNC: 123 U/L (ref 30–99)
ALT SERPL-CCNC: 23 U/L (ref 2–50)
ANION GAP SERPL CALC-SCNC: 13 MMOL/L (ref 7–16)
AST SERPL-CCNC: 22 U/L (ref 12–45)
BASOPHILS # BLD AUTO: 0.9 % (ref 0–1.8)
BASOPHILS # BLD: 0.08 K/UL (ref 0–0.12)
BILIRUB SERPL-MCNC: 0.2 MG/DL (ref 0.1–1.5)
BUN SERPL-MCNC: 8 MG/DL (ref 8–22)
CALCIUM SERPL-MCNC: 9.5 MG/DL (ref 8.5–10.5)
CHLORIDE SERPL-SCNC: 103 MMOL/L (ref 96–112)
CO2 SERPL-SCNC: 24 MMOL/L (ref 20–33)
CREAT SERPL-MCNC: 0.53 MG/DL (ref 0.5–1.4)
EOSINOPHIL # BLD AUTO: 0.32 K/UL (ref 0–0.51)
EOSINOPHIL NFR BLD: 3.6 % (ref 0–6.9)
ERYTHROCYTE [DISTWIDTH] IN BLOOD BY AUTOMATED COUNT: 47.2 FL (ref 35.9–50)
GLOBULIN SER CALC-MCNC: 4.2 G/DL (ref 1.9–3.5)
GLUCOSE SERPL-MCNC: 107 MG/DL (ref 65–99)
HCT VFR BLD AUTO: 34.7 % (ref 37–47)
HGB BLD-MCNC: 10.5 G/DL (ref 12–16)
IMM GRANULOCYTES # BLD AUTO: 0.02 K/UL (ref 0–0.11)
IMM GRANULOCYTES NFR BLD AUTO: 0.2 % (ref 0–0.9)
LYMPHOCYTES # BLD AUTO: 3.19 K/UL (ref 1–4.8)
LYMPHOCYTES NFR BLD: 35.8 % (ref 22–41)
MCH RBC QN AUTO: 27.1 PG (ref 27–33)
MCHC RBC AUTO-ENTMCNC: 30.3 G/DL (ref 33.6–35)
MCV RBC AUTO: 89.7 FL (ref 81.4–97.8)
MONOCYTES # BLD AUTO: 0.62 K/UL (ref 0–0.85)
MONOCYTES NFR BLD AUTO: 7 % (ref 0–13.4)
NEUTROPHILS # BLD AUTO: 4.67 K/UL (ref 2–7.15)
NEUTROPHILS NFR BLD: 52.5 % (ref 44–72)
NRBC # BLD AUTO: 0 K/UL
NRBC BLD-RTO: 0 /100 WBC
PLATELET # BLD AUTO: 471 K/UL (ref 164–446)
PMV BLD AUTO: 11.4 FL (ref 9–12.9)
POTASSIUM SERPL-SCNC: 3.7 MMOL/L (ref 3.6–5.5)
PROT SERPL-MCNC: 7.9 G/DL (ref 6–8.2)
RBC # BLD AUTO: 3.87 M/UL (ref 4.2–5.4)
SODIUM SERPL-SCNC: 140 MMOL/L (ref 135–145)
WBC # BLD AUTO: 8.9 K/UL (ref 4.8–10.8)

## 2021-10-12 PROCEDURE — 85025 COMPLETE CBC W/AUTO DIFF WBC: CPT

## 2021-10-12 PROCEDURE — 80053 COMPREHEN METABOLIC PANEL: CPT

## 2021-10-12 PROCEDURE — 36415 COLL VENOUS BLD VENIPUNCTURE: CPT

## 2021-10-27 ENCOUNTER — PATIENT MESSAGE (OUTPATIENT)
Dept: MEDICAL GROUP | Facility: LAB | Age: 66
End: 2021-10-27

## 2021-10-27 DIAGNOSIS — M48.062 SPINAL STENOSIS OF LUMBAR REGION WITH NEUROGENIC CLAUDICATION: ICD-10-CM

## 2021-10-27 DIAGNOSIS — M25.552 CHRONIC LEFT HIP PAIN: ICD-10-CM

## 2021-10-27 DIAGNOSIS — G89.29 CHRONIC LEFT HIP PAIN: ICD-10-CM

## 2021-10-27 RX ORDER — TRAZODONE HYDROCHLORIDE 50 MG/1
50 TABLET ORAL EVERY EVENING
Qty: 30 TABLET | Refills: 3 | Status: SHIPPED | OUTPATIENT
Start: 2021-10-27 | End: 2022-02-22

## 2021-10-27 RX ORDER — HYDROCODONE BITARTRATE AND ACETAMINOPHEN 7.5; 325 MG/1; MG/1
1 TABLET ORAL 2 TIMES DAILY PRN
Qty: 60 TABLET | Refills: 0 | Status: SHIPPED | OUTPATIENT
Start: 2021-12-26 | End: 2021-12-30 | Stop reason: SDUPTHER

## 2021-10-27 RX ORDER — HYDROCODONE BITARTRATE AND ACETAMINOPHEN 7.5; 325 MG/1; MG/1
1 TABLET ORAL 2 TIMES DAILY PRN
Qty: 60 TABLET | Refills: 0 | Status: SHIPPED | OUTPATIENT
Start: 2021-11-26 | End: 2021-12-30 | Stop reason: SDUPTHER

## 2021-10-27 RX ORDER — HYDROCODONE BITARTRATE AND ACETAMINOPHEN 7.5; 325 MG/1; MG/1
1 TABLET ORAL 2 TIMES DAILY PRN
Qty: 60 TABLET | Refills: 0 | Status: SHIPPED | OUTPATIENT
Start: 2021-10-27 | End: 2021-12-30 | Stop reason: SDUPTHER

## 2021-10-27 NOTE — PATIENT COMMUNICATION
Received request via: Patient    Was the patient seen in the last year in this department? Yes  10/4/2021  Does the patient have an active prescription (recently filled or refills available) for medication(s) requested? No

## 2021-10-27 NOTE — TELEPHONE ENCOUNTER
----- Message from Penelope Bautista sent at 10/27/2021  8:05 AM PDT -----  Regarding: Prescription  Good Morning;    I am trying to get back on track with my medication and i was wondering when  am I eligible for Norco since I do not see you until the 30th of Dec.                                                 Thank You                                                      Penelope

## 2021-11-10 ENCOUNTER — HOSPITAL ENCOUNTER (OUTPATIENT)
Dept: RADIOLOGY | Facility: MEDICAL CENTER | Age: 66
End: 2021-11-10
Attending: SURGERY
Payer: OTHER GOVERNMENT

## 2021-11-10 DIAGNOSIS — J86.9 EMPYEMA OF PLEURA (HCC): ICD-10-CM

## 2021-11-10 PROCEDURE — 700117 HCHG RX CONTRAST REV CODE 255: Performed by: SURGERY

## 2021-11-10 PROCEDURE — 71260 CT THORAX DX C+: CPT

## 2021-11-10 RX ADMIN — IOHEXOL 75 ML: 350 INJECTION, SOLUTION INTRAVENOUS at 09:15

## 2021-12-06 ENCOUNTER — TELEPHONE (OUTPATIENT)
Dept: MEDICAL GROUP | Facility: LAB | Age: 66
End: 2021-12-06

## 2021-12-06 DIAGNOSIS — J90 PLEURAL EFFUSION: ICD-10-CM

## 2021-12-06 NOTE — TELEPHONE ENCOUNTER
1. Caller Name: BRENNEN                  Call Back Number [phone] (home)         How would the patient prefer to be contacted with a response:     Pt needs a referral to pulmonology to [facility] for procedure on Friday.

## 2021-12-14 ENCOUNTER — HOSPITAL ENCOUNTER (OUTPATIENT)
Dept: RADIOLOGY | Facility: MEDICAL CENTER | Age: 66
End: 2021-12-14
Attending: NURSE PRACTITIONER
Payer: OTHER GOVERNMENT

## 2021-12-14 ENCOUNTER — PATIENT MESSAGE (OUTPATIENT)
Dept: MEDICAL GROUP | Facility: LAB | Age: 66
End: 2021-12-14

## 2021-12-14 DIAGNOSIS — M79.672 LEFT FOOT PAIN: ICD-10-CM

## 2021-12-14 DIAGNOSIS — M25.572 ACUTE LEFT ANKLE PAIN: ICD-10-CM

## 2021-12-14 PROCEDURE — 73630 X-RAY EXAM OF FOOT: CPT | Mod: LT

## 2021-12-14 PROCEDURE — 73610 X-RAY EXAM OF ANKLE: CPT | Mod: LT

## 2021-12-26 DIAGNOSIS — J45.40 MODERATE PERSISTENT ASTHMA WITHOUT COMPLICATION: ICD-10-CM

## 2021-12-27 NOTE — TELEPHONE ENCOUNTER
Received request via: Pharmacy  10/4/2021lov  Was the patient seen in the last year in this department? Yes    Does the patient have an active prescription (recently filled or refills available) for medication(s) requested? No

## 2021-12-30 ENCOUNTER — OFFICE VISIT (OUTPATIENT)
Dept: MEDICAL GROUP | Facility: LAB | Age: 66
End: 2021-12-30
Payer: OTHER GOVERNMENT

## 2021-12-30 VITALS
HEART RATE: 108 BPM | HEIGHT: 63 IN | SYSTOLIC BLOOD PRESSURE: 114 MMHG | TEMPERATURE: 96.1 F | WEIGHT: 158.9 LBS | OXYGEN SATURATION: 92 % | DIASTOLIC BLOOD PRESSURE: 70 MMHG | RESPIRATION RATE: 16 BRPM | BODY MASS INDEX: 28.16 KG/M2

## 2021-12-30 DIAGNOSIS — M48.062 SPINAL STENOSIS OF LUMBAR REGION WITH NEUROGENIC CLAUDICATION: ICD-10-CM

## 2021-12-30 DIAGNOSIS — K52.9 CHRONIC DIARRHEA: ICD-10-CM

## 2021-12-30 DIAGNOSIS — M25.552 CHRONIC LEFT HIP PAIN: ICD-10-CM

## 2021-12-30 DIAGNOSIS — G89.29 CHRONIC LEFT HIP PAIN: ICD-10-CM

## 2021-12-30 DIAGNOSIS — L65.9 HAIR LOSS: ICD-10-CM

## 2021-12-30 DIAGNOSIS — Z79.891 CHRONIC USE OF OPIATE FOR THERAPEUTIC PURPOSE: ICD-10-CM

## 2021-12-30 PROCEDURE — 99214 OFFICE O/P EST MOD 30 MIN: CPT | Performed by: NURSE PRACTITIONER

## 2021-12-30 RX ORDER — HYDROCODONE BITARTRATE AND ACETAMINOPHEN 7.5; 325 MG/1; MG/1
1 TABLET ORAL 2 TIMES DAILY PRN
Qty: 60 TABLET | Refills: 0 | Status: SHIPPED | OUTPATIENT
Start: 2022-01-25 | End: 2022-03-31 | Stop reason: SDUPTHER

## 2021-12-30 RX ORDER — HYDROCODONE BITARTRATE AND ACETAMINOPHEN 7.5; 325 MG/1; MG/1
1 TABLET ORAL 2 TIMES DAILY PRN
Qty: 60 TABLET | Refills: 0 | Status: SHIPPED | OUTPATIENT
Start: 2022-03-26 | End: 2022-03-31 | Stop reason: SDUPTHER

## 2021-12-30 RX ORDER — HYDROCODONE BITARTRATE AND ACETAMINOPHEN 7.5; 325 MG/1; MG/1
1 TABLET ORAL 2 TIMES DAILY PRN
Qty: 60 TABLET | Refills: 0 | Status: SHIPPED | OUTPATIENT
Start: 2022-02-24 | End: 2022-03-31 | Stop reason: SDUPTHER

## 2021-12-30 RX ORDER — CONJUGATED ESTROGENS 0.62 MG/G
CREAM VAGINAL
COMMUNITY
Start: 2021-11-17

## 2021-12-30 ASSESSMENT — FIBROSIS 4 INDEX: FIB4 SCORE: 0.62

## 2021-12-30 NOTE — PROGRESS NOTES
"Chief Complaint   Patient presents with   • Medication Management     HPI:  Penelope is a 66-year-old established female here to follow-up on chronic issues as well as chronic pain.  She is here today with her .  She is overall doing pretty well, just has some soreness to her right posterior trunk following surgery to remove the parenchymal mass in the early fall.  She is still on oxygen at night and awaiting an overnight pulse oximetry study through pulmonology.  She last saw pulmonology 1 week ago and is awaiting an updated CT scan.    In terms of her chronic pain, she continues to limit herself to 2 Norco per day and states her pain is controlled.  She does not have constipation, in fact she has had diarrhea for many years.  She has no longer having to use a walker for any type of walking.  She has chronically taken her pain medication for chronic low back and left hip pain.  Last MRI of her spine was 2018 although she had a lumbar spine x-ray in 2021 that showed retrolisthesis and degenerative disc disease.    She is also concerned about hair loss, stating that chunks have been falling out for the past several weeks.  No new medications.  She did go through a stressful hospitalization a few months ago.  She states that her nutrition is pretty poor, she typically eats a half a bagel twice a day and occasionally dinner.  She is not a vegetarian.  She has chronic diarrhea.  She has no diagnosed thyroid disease but states her TSH/T4 have been abnormal periodically in the past.  She does have dry skin.  She denies unintentional weight changes.    Exam:  /70 (BP Location: Right arm, Patient Position: Sitting, BP Cuff Size: Adult)   Pulse (!) 108   Temp (!) 35.6 °C (96.1 °F) (Temporal)   Resp 16   Ht 1.6 m (5' 3\")   Wt 72.1 kg (158 lb 14.4 oz)   SpO2 92%   Gen. appears healthy in no distress   Skin appropriate for sex and age   Neck trachea is midline  Lungs unlabored breathing  Heart regular " "rate  Musculoskeletal: She does have soreness with deep palpation to her right supra scapular and intercostal regions to right posterior trunk -without overlying bruising or deformity.  Neuro gait and station normal   Psych appropriate, calm, interactive, well-groomed    A/P:  \"  1. Hair loss  CBC WITH DIFFERENTIAL    IRON/TOTAL IRON BIND    TSH    FREE THYROXINE    Comp Metabolic Panel   2. Chronic diarrhea  CBC WITH DIFFERENTIAL    IRON/TOTAL IRON BIND    TSH    FREE THYROXINE    Comp Metabolic Panel   3. Spinal stenosis of lumbar region with neurogenic claudication  HYDROcodone-acetaminophen (NORCO) 7.5-325 MG per tablet    HYDROcodone-acetaminophen (NORCO) 7.5-325 MG per tablet    HYDROcodone-acetaminophen (NORCO) 7.5-325 MG per tablet   4. Chronic left hip pain  HYDROcodone-acetaminophen (NORCO) 7.5-325 MG per tablet    HYDROcodone-acetaminophen (NORCO) 7.5-325 MG per tablet    HYDROcodone-acetaminophen (NORCO) 7.5-325 MG per tablet   \"  Overall impression that this patient is benefiting from opioid therapy and that the benefits outweigh the risks of continued use. yes   - Controlled Substance Use Agreement current  - Urine drug screen current and reviewed / compliant with rx medications  - Additional lab: CMP to assess liver and renal function - UTD   - Rx refill prescriptions are done for 3 months, No early refills.   - Referral to pain management no    In prescribing controlled substances to this patient, I certify that I have obtained and reviewed the medical history of Penelope Bautista. I have also made a good abelardo effort to obtain applicable records from other providers who have treated the patient and records did not demonstrate any increased risk of substance abuse that would prevent me from prescribing controlled substances.     I have conducted a physical exam and documented it. I have reviewed Ms. Bautista’s prescription history as maintained by the [place] Prescription Monitoring Program.     I " have assessed the patient’s risk for abuse, dependency, and addiction using the validated Opioid Risk Tool available at https://www.mdcalc.com/hdbwty-exol-jeby-ort-narcotic-abuse.     Given the above, I believe the benefits of controlled substance therapy outweigh the risks. The reasons for prescribing controlled substances include non-narcotic, oral analgesic alternatives have been inadequate for pain control. Accordingly, I have discussed the risk and benefits, treatment plan, and alternative therapies with the patient.     In terms of her soreness, we discussed scar tissue status post surgery.  I gave her various stretches to try.    In regards to hair loss, recommend a full updated lab panel as above.

## 2022-01-14 LAB
ALBUMIN SERPL-MCNC: 4.5 G/DL (ref 3.8–4.8)
ALBUMIN/GLOB SERPL: 1.7 {RATIO} (ref 1.2–2.2)
ALP SERPL-CCNC: 111 IU/L (ref 44–121)
ALT SERPL-CCNC: 22 IU/L (ref 0–32)
AST SERPL-CCNC: 20 IU/L (ref 0–40)
BASOPHILS # BLD AUTO: 0.1 X10E3/UL (ref 0–0.2)
BASOPHILS NFR BLD AUTO: 1 %
BILIRUB SERPL-MCNC: <0.2 MG/DL (ref 0–1.2)
BUN SERPL-MCNC: 19 MG/DL (ref 8–27)
BUN/CREAT SERPL: 21 (ref 12–28)
CALCIUM SERPL-MCNC: 10.5 MG/DL (ref 8.7–10.3)
CHLORIDE SERPL-SCNC: 100 MMOL/L (ref 96–106)
CO2 SERPL-SCNC: 23 MMOL/L (ref 20–29)
CREAT SERPL-MCNC: 0.91 MG/DL (ref 0.57–1)
EOSINOPHIL # BLD AUTO: 0.3 X10E3/UL (ref 0–0.4)
EOSINOPHIL NFR BLD AUTO: 2 %
ERYTHROCYTE [DISTWIDTH] IN BLOOD BY AUTOMATED COUNT: 14.6 % (ref 11.7–15.4)
GLOBULIN SER CALC-MCNC: 2.6 G/DL (ref 1.5–4.5)
GLUCOSE SERPL-MCNC: 85 MG/DL (ref 65–99)
HCT VFR BLD AUTO: 41.2 % (ref 34–46.6)
HGB BLD-MCNC: 13.1 G/DL (ref 11.1–15.9)
IMM GRANULOCYTES # BLD AUTO: 0 X10E3/UL (ref 0–0.1)
IMM GRANULOCYTES NFR BLD AUTO: 0 %
IMMATURE CELLS  115398: ABNORMAL
IRON SATN MFR SERPL: 8 % (ref 15–55)
IRON SERPL-MCNC: 34 UG/DL (ref 27–139)
LYMPHOCYTES # BLD AUTO: 3.8 X10E3/UL (ref 0.7–3.1)
LYMPHOCYTES NFR BLD AUTO: 34 %
MCH RBC QN AUTO: 26.5 PG (ref 26.6–33)
MCHC RBC AUTO-ENTMCNC: 31.8 G/DL (ref 31.5–35.7)
MCV RBC AUTO: 83 FL (ref 79–97)
MONOCYTES # BLD AUTO: 0.7 X10E3/UL (ref 0.1–0.9)
MONOCYTES NFR BLD AUTO: 6 %
MORPHOLOGY BLD-IMP: ABNORMAL
NEUTROPHILS # BLD AUTO: 6.3 X10E3/UL (ref 1.4–7)
NEUTROPHILS NFR BLD AUTO: 57 %
NRBC BLD AUTO-RTO: ABNORMAL %
PLATELET # BLD AUTO: 362 X10E3/UL (ref 150–450)
POTASSIUM SERPL-SCNC: 4.5 MMOL/L (ref 3.5–5.2)
PROT SERPL-MCNC: 7.1 G/DL (ref 6–8.5)
RBC # BLD AUTO: 4.95 X10E6/UL (ref 3.77–5.28)
SODIUM SERPL-SCNC: 138 MMOL/L (ref 134–144)
T4 FREE SERPL-MCNC: 1.15 NG/DL (ref 0.82–1.77)
TIBC SERPL-MCNC: 406 UG/DL (ref 250–450)
TSH SERPL DL<=0.005 MIU/L-ACNC: 0.66 UIU/ML (ref 0.45–4.5)
UIBC SERPL-MCNC: 372 UG/DL (ref 118–369)
WBC # BLD AUTO: 11 X10E3/UL (ref 3.4–10.8)

## 2022-01-17 ENCOUNTER — TELEPHONE (OUTPATIENT)
Dept: MEDICAL GROUP | Facility: LAB | Age: 67
End: 2022-01-17

## 2022-01-17 DIAGNOSIS — D50.9 IRON DEFICIENCY ANEMIA, UNSPECIFIED IRON DEFICIENCY ANEMIA TYPE: ICD-10-CM

## 2022-01-17 NOTE — LETTER
January 17, 2022        Penelope Bautista  [address] [place] [address]        Dear Penelope:    See attached labs to do 4 weeks after starting Iron.    If you have any questions or concerns, please don't hesitate to call.        Sincerely,        CARMELLA Lorenzo.    Electronically Signed

## 2022-01-26 ENCOUNTER — OFFICE VISIT (OUTPATIENT)
Dept: MEDICAL GROUP | Facility: LAB | Age: 67
End: 2022-01-26
Payer: OTHER GOVERNMENT

## 2022-01-26 VITALS
TEMPERATURE: 97.3 F | DIASTOLIC BLOOD PRESSURE: 68 MMHG | BODY MASS INDEX: 24.43 KG/M2 | SYSTOLIC BLOOD PRESSURE: 110 MMHG | RESPIRATION RATE: 16 BRPM | HEIGHT: 66 IN | HEART RATE: 100 BPM | OXYGEN SATURATION: 93 % | WEIGHT: 152 LBS

## 2022-01-26 DIAGNOSIS — M54.6 ACUTE RIGHT-SIDED THORACIC BACK PAIN: ICD-10-CM

## 2022-01-26 DIAGNOSIS — Z98.890 S/P THORACOTOMY: ICD-10-CM

## 2022-01-26 PROCEDURE — 99213 OFFICE O/P EST LOW 20 MIN: CPT | Performed by: NURSE PRACTITIONER

## 2022-01-26 RX ORDER — GABAPENTIN 300 MG/1
300-900 CAPSULE ORAL 3 TIMES DAILY
Qty: 270 CAPSULE | Refills: 4 | Status: SHIPPED | OUTPATIENT
Start: 2022-01-26 | End: 2022-09-15 | Stop reason: SDUPTHER

## 2022-01-26 RX ORDER — METHYLPREDNISOLONE 4 MG/1
TABLET ORAL
Qty: 21 TABLET | Refills: 0 | Status: SHIPPED | OUTPATIENT
Start: 2022-01-26 | End: 2022-02-09 | Stop reason: SDUPTHER

## 2022-01-26 ASSESSMENT — PATIENT HEALTH QUESTIONNAIRE - PHQ9: CLINICAL INTERPRETATION OF PHQ2 SCORE: 0

## 2022-01-26 ASSESSMENT — FIBROSIS 4 INDEX: FIB4 SCORE: 0.75

## 2022-01-26 NOTE — PATIENT INSTRUCTIONS
-Find generic rogaine scalp treatment to leave in after washing. (active ingredient is minoxidil)  .

## 2022-01-26 NOTE — PROGRESS NOTES
"Chief Complaint   Patient presents with   • Back Pain     right side upper back       HPI:  Penelope is a 66-year-old established female here with her  today with complaint of worsening right sided trunk pain.  She had a thoracotomy in September and has been suffering from pain above the incision ever since, now moving upwards and causing her unbearable pain.  She describes the pain is radiating, stabbing, burning.  Fortunately had a CT scan done a few days ago that was negative for any type of abscess or abnormality following surgery.  She can get comfortable if she holds still or is in a certain position in bed such as lying flat on her back.  The pain is much worse with movement of her right upper extremity.  Denies any falls or injuries.  She is a chronic pain patient and is trying not to take more of her allowed pain medication.  She does take gabapentin and trazodone at night.  She has tried Tylenol and ibuprofen without improvement.      Exam:  /68 (BP Location: Left arm, Patient Position: Sitting, BP Cuff Size: Large adult)   Pulse 100   Temp 36.3 °C (97.3 °F)   Resp 16   Ht 1.676 m (5' 6\")   Wt 68.9 kg (152 lb)   SpO2 93%   Gen. appears healthy in no distress   Skin she does have dry, scaly patches to her back, each measuring just a few centimeters and appeared to be eczema or psoriasis.  Neck trachea is midline  Lungs unlabored breathing  Heart regular rate  Right upper back: Well-healed incision without overlying erythema, fluctuance, bruising or any abnormality.  She experiences pain in this area, just overlying her scapula with extension of her arm above her head and forward abduction of her arm across her body.   are 2 out of 2 bilaterally.  No upper extremity deformity.  Neuro gait and station normal   Psych appropriate, calm, interactive, well-groomed    A/P:  \"  1. Acute right-sided thoracic back pain  gabapentin (NEURONTIN) 300 MG Cap    methylPREDNISolone (MEDROL DOSEPAK) 4 " MG Tablet Therapy Pack   2. S/P thoracotomy       May increase gabapentin as tolerated by 300 mg every few days up to 6 to 900 mg 3 times a day.  Discussed increasing gabapentin can certainly cause dizziness, fatigue and fall risk.  She continues on her chronic pain medication and these were not adjusted today.  Trial of a Medrol Dosepak to calm down soft tissue and nerve inflammation and she will follow up with me in 1 week regarding how she responds to this.  She also follows up with pulmonology in the near future.  She brought a copy of her CT scan ordered by general surgery and this was scanned into her chart, does not show any acute abnormality such as infection causing her pain.  She is also very worried about hair loss since her thoracotomy and we discussed over-the-counter scalp treatment with minoxidil, a high-protein diet, vitamin C, iron and biotin.  Discussed topical cortisone 10 and Aquaphor to the rash on her back as well as that a Medrol pack should help with this.    Side effects of all medications prescribed today were discussed with the patient including how to take the medications and proper dosage. Discussed repercussions of not taking the medications as prescribed. Instructed to call the office should she have any negative side effects or problems with the medications.

## 2022-02-08 ENCOUNTER — PATIENT MESSAGE (OUTPATIENT)
Dept: MEDICAL GROUP | Facility: LAB | Age: 67
End: 2022-02-08

## 2022-02-08 DIAGNOSIS — M54.6 ACUTE RIGHT-SIDED THORACIC BACK PAIN: ICD-10-CM

## 2022-02-08 NOTE — TELEPHONE ENCOUNTER
From: Penelope Bautista  To: Nurse Practitioner Laura Benjamin  Sent: 2/8/2022 3:56 PM PST  Subject: Medrol Dosepak    Good afternon;   Sorry for not getting back as quick as i would liked but things came up concerning my mother that needed attention so the medrol dosepak was working fine it was working a little bit eveyday i wish there was more to have if possable can i get one that lasts a bit longer ? i know we kalee talked about it let me know also i left a message with the office to see if you are taking any new patients if so let me know .   You have a wonderful afternoon   Penelope

## 2022-02-09 RX ORDER — METHYLPREDNISOLONE 4 MG/1
TABLET ORAL
Qty: 21 TABLET | Refills: 0 | Status: SHIPPED
Start: 2022-02-09 | End: 2022-03-31

## 2022-02-15 LAB
IRON SATN MFR SERPL: 46 % (ref 15–55)
IRON SERPL-MCNC: 178 UG/DL (ref 27–139)
TIBC SERPL-MCNC: 391 UG/DL (ref 250–450)
UIBC SERPL-MCNC: 213 UG/DL (ref 118–369)

## 2022-02-22 RX ORDER — TRAZODONE HYDROCHLORIDE 50 MG/1
TABLET ORAL
Qty: 30 TABLET | Refills: 3 | Status: SHIPPED | OUTPATIENT
Start: 2022-02-22 | End: 2022-06-23 | Stop reason: SDUPTHER

## 2022-02-22 NOTE — TELEPHONE ENCOUNTER
Received request via: Pharmacy    Was the patient seen in the last year in this department? Yes  1/26/2022  Does the patient have an active prescription (recently filled or refills available) for medication(s) requested? No

## 2022-03-25 DIAGNOSIS — F51.01 PRIMARY INSOMNIA: ICD-10-CM

## 2022-03-25 RX ORDER — QUETIAPINE FUMARATE 50 MG/1
50 TABLET, FILM COATED ORAL EVERY EVENING
Qty: 90 TABLET | Refills: 3 | Status: CANCELLED | OUTPATIENT
Start: 2022-03-25

## 2022-03-25 NOTE — TELEPHONE ENCOUNTER
Received request via: Pharmacy    Was the patient seen in the last year in this department? Yes  1/26/22  Does the patient have an active prescription (recently filled or refills available) for medication(s) requested? No

## 2022-03-30 DIAGNOSIS — J45.40 MODERATE PERSISTENT ASTHMA WITHOUT COMPLICATION: ICD-10-CM

## 2022-03-30 RX ORDER — ALBUTEROL SULFATE 90 UG/1
2 AEROSOL, METERED RESPIRATORY (INHALATION) EVERY 6 HOURS PRN
Qty: 3 EACH | Refills: 2 | Status: SHIPPED | OUTPATIENT
Start: 2022-03-30 | End: 2023-01-19 | Stop reason: SDUPTHER

## 2022-03-30 NOTE — TELEPHONE ENCOUNTER
Received request via: Patient    Was the patient seen in the last year in this department? Yes  LOV 01/26/2022  Does the patient have an active prescription (recently filled or refills available) for medication(s) requested? No

## 2022-03-31 ENCOUNTER — OFFICE VISIT (OUTPATIENT)
Dept: MEDICAL GROUP | Facility: LAB | Age: 67
End: 2022-03-31
Payer: OTHER GOVERNMENT

## 2022-03-31 VITALS
DIASTOLIC BLOOD PRESSURE: 60 MMHG | SYSTOLIC BLOOD PRESSURE: 124 MMHG | OXYGEN SATURATION: 92 % | TEMPERATURE: 98 F | WEIGHT: 149 LBS | BODY MASS INDEX: 26.4 KG/M2 | RESPIRATION RATE: 16 BRPM | HEIGHT: 63 IN | HEART RATE: 106 BPM

## 2022-03-31 DIAGNOSIS — F41.9 ANXIETY: ICD-10-CM

## 2022-03-31 DIAGNOSIS — F51.01 PRIMARY INSOMNIA: ICD-10-CM

## 2022-03-31 DIAGNOSIS — M25.552 CHRONIC LEFT HIP PAIN: ICD-10-CM

## 2022-03-31 DIAGNOSIS — Z79.891 CHRONIC USE OF OPIATE FOR THERAPEUTIC PURPOSE: ICD-10-CM

## 2022-03-31 DIAGNOSIS — M48.062 SPINAL STENOSIS OF LUMBAR REGION WITH NEUROGENIC CLAUDICATION: ICD-10-CM

## 2022-03-31 DIAGNOSIS — Z98.890 S/P THORACOTOMY: ICD-10-CM

## 2022-03-31 DIAGNOSIS — F43.9 STRESS: ICD-10-CM

## 2022-03-31 DIAGNOSIS — G89.29 CHRONIC LEFT HIP PAIN: ICD-10-CM

## 2022-03-31 PROCEDURE — 99214 OFFICE O/P EST MOD 30 MIN: CPT | Performed by: NURSE PRACTITIONER

## 2022-03-31 RX ORDER — QUETIAPINE FUMARATE 50 MG/1
50 TABLET, FILM COATED ORAL EVERY EVENING
Qty: 90 TABLET | Refills: 3 | Status: SHIPPED | OUTPATIENT
Start: 2022-03-31 | End: 2022-06-23 | Stop reason: SDUPTHER

## 2022-03-31 RX ORDER — HYDROCODONE BITARTRATE AND ACETAMINOPHEN 7.5; 325 MG/1; MG/1
1 TABLET ORAL 2 TIMES DAILY PRN
Qty: 60 TABLET | Refills: 0 | Status: SHIPPED | OUTPATIENT
Start: 2022-04-25 | End: 2022-06-23 | Stop reason: SDUPTHER

## 2022-03-31 RX ORDER — CLONIDINE HYDROCHLORIDE 0.1 MG/1
0.1 TABLET ORAL 2 TIMES DAILY PRN
Qty: 30 TABLET | Refills: 1 | Status: SHIPPED | OUTPATIENT
Start: 2022-03-31 | End: 2022-07-25 | Stop reason: SDUPTHER

## 2022-03-31 RX ORDER — HYDROCODONE BITARTRATE AND ACETAMINOPHEN 7.5; 325 MG/1; MG/1
1 TABLET ORAL 2 TIMES DAILY PRN
Qty: 60 TABLET | Refills: 0 | Status: SHIPPED | OUTPATIENT
Start: 2022-05-25 | End: 2022-06-23 | Stop reason: SDUPTHER

## 2022-03-31 RX ORDER — HYDROCODONE BITARTRATE AND ACETAMINOPHEN 7.5; 325 MG/1; MG/1
1 TABLET ORAL 2 TIMES DAILY PRN
Qty: 60 TABLET | Refills: 0 | Status: SHIPPED | OUTPATIENT
Start: 2022-06-24 | End: 2022-06-23 | Stop reason: SDUPTHER

## 2022-03-31 ASSESSMENT — FIBROSIS 4 INDEX: FIB4 SCORE: 0.75

## 2022-03-31 NOTE — PROGRESS NOTES
CC  Follow-up      HPI:  Penelope is a 66-year-old established female here to follow-up on chronic pain.  She is also under a lot of stress as her mom is on hospice, her sister is living with her mom and very frustrating for her.  She would like a refill of clonidine which has helped her in the past with stress and anxiety.  She is getting almost a daily headache when she returns from her mom's house.  She is not exercising at all.    Chronic pain:  Left low back, radiating down buttocks and right trunk from previous surgery.  Last dose of controlled substance: Today  Chronic pain treated with hydrocodone taken twice a day    She  reports no history of alcohol use.  She  reports no history of drug use.    Interval history: She is seen here every 3 months  Any major change in health since last appointment? Yes As above, stressors    Consequences of Chronic Opiate therapy:  (5 A's)  Analgesia: Compared to no treatment or prior treatment, pain is currently worsened  Activity: not changed  Adverse Events: She denies constipation, dry mouth, itchy skin, nausea and sedation  Aberrant Behaviors: She reports she is taking medication as prescribed and is not veering from agreed treatment regimen or provider recommendations. There have been no inappropriate refills or lost/stolen meds reported.  Affect/Mood: Pain is impacting patient's mood.  Patient reports anxiety.    Nonnarcotic treatments that are being used: Tylenol, Gabapentin, SSRI/SNRI, ice and heat.     Opioid Risk Score: 2    Interpretation of Opioid Risk Score   Score 0-3 = Low risk of abuse. Do UDS at least once per year.  Score 4-7 = Moderate risk of abuse. Do UDS 1-4 times per year.  Score 8+ = High risk of abuse. Refer to specialist.    Last order of CONTROLLED SUBSTANCE TREATMENT AGREEMENT was found on 5/2/2016 from Office Visit on 5/2/2016     UDS Summary          URINE DRUG SCREEN (Every 360 Days) Tentatively due on 6/25/2022 06/30/2021  Pain Management  "Screen    06/17/2020  Pain Management Screen    06/01/2020  Pain Management Screen    06/27/2019  Pain Management Screen    05/02/2016  PAIN MANAGEMENT SCRN, UR              Most recent UDS reviewed today and is consistent with prescribed medications.     I have reviewed the medical records, the Prescription Monitoring Program and I have determined that controlled substance treatment is medically indicated.       Nocturnal hypoxia: This has been an issue for the patient since she was [facility] in [facility] with diagnosis of a large pleural effusion and up having thoracotomy for empyema.  Using oxygen at night.  Seeing pulmonology at [facility], [place] and struggling to get result of pulse oximetry study - unsure of what company did this. Test was 3-4 weeks ago.       Exam:  /60 (BP Location: Left arm, Patient Position: Sitting, BP Cuff Size: Adult)   Pulse (!) 106   Temp 36.7 °C (98 °F)   Resp 16   Ht 1.6 m (5' 3\")   Wt 67.6 kg (149 lb)   SpO2 92%   Gen. appears healthy in no distress   Skin appropriate for sex and age   Neck trachea is midline  Lungs unlabored breathing  Heart regular rate  Neuro gait and station normal   Psych appropriate, calm, interactive, well-groomed    A/P:  1. Stress  cloNIDine (CATAPRES) 0.1 MG Tab   2. Anxiety  cloNIDine (CATAPRES) 0.1 MG Tab   3. Primary insomnia  QUEtiapine (SEROQUEL) 50 MG tablet   4. Spinal stenosis of lumbar region with neurogenic claudication  HYDROcodone-acetaminophen (NORCO) 7.5-325 MG tab    HYDROcodone-acetaminophen (NORCO) 7.5-325 MG tab    HYDROcodone-acetaminophen (NORCO) 7.5-325 MG tab   5. Chronic left hip pain  HYDROcodone-acetaminophen (NORCO) 7.5-325 MG tab    HYDROcodone-acetaminophen (NORCO) 7.5-325 MG tab    HYDROcodone-acetaminophen (NORCO) 7.5-325 MG tab   6. Chronic use of opiate for therapeutic purpose     In prescribing controlled substances to this patient, I certify that I have obtained and reviewed the medical history of " Penelope Corona Bautista. I have also made a good abelardo effort to obtain applicable records from other providers who have treated the patient and records did not demonstrate any increased risk of substance abuse that would prevent me from prescribing controlled substances.     I have conducted a physical exam and documented it. I have reviewed Ms. Bautista’s prescription history as maintained by the [place] Prescription Monitoring Program.     I have assessed the patient’s risk for abuse, dependency, and addiction using the validated Opioid Risk Tool available at https://www.mdcalc.com/bjvtum-khcq-zmms-ort-narcotic-abuse.     Given the above, I believe the benefits of controlled substance therapy outweigh the risks. The reasons for prescribing controlled substances include non-narcotic, oral analgesic alternatives have been inadequate for pain control. Accordingly, I have discussed the risk and benefits, treatment plan, and alternative therapies with the patient.     Refilled clonidine.  Her previous prescription was 0.2, I lowered this to 0.1 because of her current low blood pressure and lower weight than when she was given a different prescription.  Discussed that clonidine can make her a bit tired and dizzy.  Discussed side effects such as bradycardia and hypotension.  Fortunately clonidine is helpful for her anxiety.  We also had a good discussion about the importance of her starting an exercise program for headache and stress management as well as bone strengthening, improving her posture and overall long-term fall precaution.  Also discussed that walking will help break up some of the scar tissue from previous thoracotomy and over time lessen her pain there.    Labs are up-to-date.  She declined pneumonia shot today.  She will be due for urine drug screen at her next visit.  Fortunately hair loss is improving.  She is taking iron 3 times per week.

## 2022-06-23 ENCOUNTER — HOSPITAL ENCOUNTER (OUTPATIENT)
Facility: MEDICAL CENTER | Age: 67
End: 2022-06-23
Attending: NURSE PRACTITIONER
Payer: OTHER GOVERNMENT

## 2022-06-23 ENCOUNTER — OFFICE VISIT (OUTPATIENT)
Dept: MEDICAL GROUP | Facility: LAB | Age: 67
End: 2022-06-23
Payer: OTHER GOVERNMENT

## 2022-06-23 VITALS
TEMPERATURE: 97.9 F | BODY MASS INDEX: 22.66 KG/M2 | HEIGHT: 66 IN | SYSTOLIC BLOOD PRESSURE: 168 MMHG | RESPIRATION RATE: 16 BRPM | DIASTOLIC BLOOD PRESSURE: 82 MMHG | HEART RATE: 100 BPM | WEIGHT: 141 LBS | OXYGEN SATURATION: 91 %

## 2022-06-23 DIAGNOSIS — R00.2 HEART PALPITATIONS: ICD-10-CM

## 2022-06-23 DIAGNOSIS — J06.9 ACUTE URI: ICD-10-CM

## 2022-06-23 DIAGNOSIS — M25.552 CHRONIC LEFT HIP PAIN: ICD-10-CM

## 2022-06-23 DIAGNOSIS — M48.062 SPINAL STENOSIS OF LUMBAR REGION WITH NEUROGENIC CLAUDICATION: ICD-10-CM

## 2022-06-23 DIAGNOSIS — G89.29 CHRONIC LEFT HIP PAIN: ICD-10-CM

## 2022-06-23 DIAGNOSIS — F51.01 PRIMARY INSOMNIA: ICD-10-CM

## 2022-06-23 DIAGNOSIS — Z79.891 CHRONIC USE OF OPIATE FOR THERAPEUTIC PURPOSE: ICD-10-CM

## 2022-06-23 LAB
EXTERNAL QUALITY CONTROL: NORMAL
SARS-COV+SARS-COV-2 AG RESP QL IA.RAPID: NEGATIVE

## 2022-06-23 PROCEDURE — 0240U HCHG SARS-COV-2 COVID-19 NFCT DS RESP RNA 3 TRGT MIC: CPT

## 2022-06-23 PROCEDURE — 87426 SARSCOV CORONAVIRUS AG IA: CPT | Performed by: NURSE PRACTITIONER

## 2022-06-23 PROCEDURE — 99214 OFFICE O/P EST MOD 30 MIN: CPT | Performed by: NURSE PRACTITIONER

## 2022-06-23 RX ORDER — METHYLPREDNISOLONE 4 MG/1
TABLET ORAL
Qty: 21 TABLET | Refills: 0 | Status: SHIPPED
Start: 2022-06-23 | End: 2022-09-15

## 2022-06-23 RX ORDER — HYDROCODONE BITARTRATE AND ACETAMINOPHEN 7.5; 325 MG/1; MG/1
1 TABLET ORAL 2 TIMES DAILY PRN
Qty: 60 TABLET | Refills: 0 | Status: SHIPPED | OUTPATIENT
Start: 2022-06-24 | End: 2022-09-15 | Stop reason: SDUPTHER

## 2022-06-23 RX ORDER — DOXYCYCLINE HYCLATE 100 MG
100 TABLET ORAL 2 TIMES DAILY
Qty: 14 TABLET | Refills: 0 | Status: SHIPPED | OUTPATIENT
Start: 2022-06-23 | End: 2022-06-30

## 2022-06-23 RX ORDER — PROPRANOLOL HYDROCHLORIDE 10 MG/1
10 TABLET ORAL 2 TIMES DAILY PRN
Qty: 180 TABLET | Refills: 3 | Status: SHIPPED | OUTPATIENT
Start: 2022-06-23 | End: 2023-06-19 | Stop reason: SDUPTHER

## 2022-06-23 RX ORDER — HYDROCODONE BITARTRATE AND ACETAMINOPHEN 7.5; 325 MG/1; MG/1
1 TABLET ORAL 2 TIMES DAILY PRN
Qty: 60 TABLET | Refills: 0 | Status: SHIPPED | OUTPATIENT
Start: 2022-07-24 | End: 2022-09-15 | Stop reason: SDUPTHER

## 2022-06-23 RX ORDER — QUETIAPINE FUMARATE 50 MG/1
50 TABLET, FILM COATED ORAL EVERY EVENING
Qty: 90 TABLET | Refills: 3 | Status: SHIPPED | OUTPATIENT
Start: 2022-06-23 | End: 2023-03-02 | Stop reason: SDUPTHER

## 2022-06-23 RX ORDER — HYDROCODONE BITARTRATE AND ACETAMINOPHEN 7.5; 325 MG/1; MG/1
1 TABLET ORAL 2 TIMES DAILY PRN
Qty: 60 TABLET | Refills: 0 | Status: SHIPPED | OUTPATIENT
Start: 2022-08-23 | End: 2022-09-15 | Stop reason: SDUPTHER

## 2022-06-23 RX ORDER — TRAZODONE HYDROCHLORIDE 50 MG/1
50 TABLET ORAL EVERY EVENING
Qty: 90 TABLET | Refills: 3 | Status: SHIPPED | OUTPATIENT
Start: 2022-06-23 | End: 2023-03-02 | Stop reason: SDUPTHER

## 2022-06-23 RX ORDER — PROPRANOLOL HYDROCHLORIDE 10 MG/1
10 TABLET ORAL 2 TIMES DAILY PRN
Qty: 60 TABLET | Refills: 5 | Status: SHIPPED
Start: 2022-06-23 | End: 2022-09-15

## 2022-06-23 ASSESSMENT — FIBROSIS 4 INDEX: FIB4 SCORE: 0.75

## 2022-06-23 NOTE — PROGRESS NOTES
CC  Pain f/u  Cold symptoms      HPI:  Penelope is a 66-year-old established female here to follow-up on chronic pain.   Chronic pain:  Left low back, radiating down buttocks and right trunk from previous surgery.  Last dose of controlled substance: yesterday  Chronic pain treated with hydrocodone taken twice a day     She  reports no history of alcohol use.  She  reports no history of drug use.     Interval history: She is seen here every 3 months  Any major change in health since last appointment? Yes As above, stressors     Consequences of Chronic Opiate therapy:  (5 A's)  Analgesia: Compared to no treatment or prior treatment, pain is currently worsened  Activity: not changed  Adverse Events: She denies constipation, dry mouth, itchy skin, nausea and sedation  Aberrant Behaviors: She reports she is taking medication as prescribed and is not veering from agreed treatment regimen or provider recommendations. There have been no inappropriate refills or lost/stolen meds reported.  Affect/Mood: Pain is impacting patient's mood.  Patient reports anxiety.     Nonnarcotic treatments that are being used: Tylenol, Gabapentin, SSRI/SNRI, ice and heat.      Opioid Risk Score: 2     Interpretation of Opioid Risk Score   Score 0-3 = Low risk of abuse. Do UDS at least once per year.  Score 4-7 = Moderate risk of abuse. Do UDS 1-4 times per year.  Score 8+ = High risk of abuse. Refer to specialist.      #2- URI:  Onset 4 d ago of ST, cough, fatigue and intermittent shortness of breath.   was sick last week.  Vaccinated against COVID.  No other associated symptoms.  Has albuterol but has not been using.    #3- insomnia: Chronic issue.  Requesting refill of trazodone as her mail order prescription has not arrived.    #4-heart palpitations: Chronic issue.  Needs a refill of propanolol, similar to above she is waiting on mail order to ship her propanolol.      Exam:  BP (!) 168/82 (BP Location: Left arm, Patient Position:  "Sitting, BP Cuff Size: Large adult)   Pulse 100   Temp 36.6 °C (97.9 °F)   Resp 16   Ht 1.676 m (5' 6\")   Wt 64 kg (141 lb)   SpO2 91%   Constitutional: Alert, no distress, plus 3 vital signs  Skin:  Warm, dry, no rashes invisible areas  Eye: Equal, round and reactive, conjunctiva clear  ENMT: Tympanic membranes translucent bilaterally.  Oropharynx slightly injected.  Neck: Mild anterior cervical, nontender lymphadenopathy  Respiratory: Unlabored breathing although she has expiratory wheezing and rhonchi to her upper lobes.  Cardiovascular: Normal rate and rhythm, no murmur, no edema  Psych: Alert, pleasant, well-groomed, normal affect    A/P:  \"  1. Spinal stenosis of lumbar region with neurogenic claudication  HYDROcodone-acetaminophen (NORCO) 7.5-325 MG tab    HYDROcodone-acetaminophen (NORCO) 7.5-325 MG tab    HYDROcodone-acetaminophen (NORCO) 7.5-325 MG tab   2. Chronic left hip pain  HYDROcodone-acetaminophen (NORCO) 7.5-325 MG tab    HYDROcodone-acetaminophen (NORCO) 7.5-325 MG tab    HYDROcodone-acetaminophen (NORCO) 7.5-325 MG tab   3. Chronic use of opiate for therapeutic purpose  Pain Management Screen    Controlled Substance Treatment Agreement   4. Acute URI  POCT SARS-COV Antigen JUAN (Symptomatic only)    DX-CHEST-2 VIEWS    CoV-2 and Flu A/B by PCR (24 hour In-House): Collect NP swab in VTM    methylPREDNISolone (MEDROL DOSEPAK) 4 MG Tablet Therapy Pack    doxycycline (VIBRAMYCIN) 100 MG Tab   5. Primary insomnia  QUEtiapine (SEROQUEL) 50 MG tablet    propranolol (INDERAL) 10 MG Tab   6. Heart palpitations  propranolol (INDERAL) 10 MG Tab   \"  Overall impression that this patient is benefiting from opioid therapy and that the benefits outweigh the risks of continued use. yes   - Controlled Substance Use Agreement updated today  - Urine drug screen updated today  - Additional lab: CMP to assess liver and renal function - UTD   - Rx refill prescriptions are done for 3 months, No early refills.   - " Referral to pain management no  In prescribing controlled substances to this patient, I certify that I have obtained and reviewed the medical history of Penelope Bautista. I have also made a good abelardo effort to obtain applicable records from other providers who have treated the patient and records did not demonstrate any increased risk of substance abuse that would prevent me from prescribing controlled substances.     I have conducted a physical exam and documented it. I have reviewed Ms. Bautista’s prescription history as maintained by the [place] Prescription Monitoring Program.     I have assessed the patient’s risk for abuse, dependency, and addiction using the validated Opioid Risk Tool available at https://www.mdcalc.com/miavvu-qhgp-rzuu-ort-narcotic-abuse.     Given the above, I believe the benefits of controlled substance therapy outweigh the risks. The reasons for prescribing controlled substances include non-narcotic, oral analgesic alternatives have been inadequate for pain control. Accordingly, I have discussed the risk and benefits, treatment plan, and alternative therapies with the patient.       In terms of her upper respiratory tract infection, negative COVID test in our office today and PCR sent to the lab.  Suspect bacterial pneumonia, especially considering her medical history and I prescribed doxycycline along with a Medrol Dosepak.  Discussed ER precautions over the next 72 to 96 hours if she begins to have a fever greater than 102 or overall worsening symptoms.  Discussed using albuterol every 4-6 hours as needed for shortness of breath or wheezing.  She does have an incentive spirometer and I encouraged her to use that a few times per day.    Refilled trazodone and propanolol along with Seroquel.  Stable conditions.    Follow-up 3 months.

## 2022-06-24 DIAGNOSIS — J06.9 ACUTE URI: ICD-10-CM

## 2022-06-24 LAB
FLUAV RNA SPEC QL NAA+PROBE: NEGATIVE
FLUBV RNA SPEC QL NAA+PROBE: NEGATIVE
SARS-COV-2 RNA RESP QL NAA+PROBE: NOTDETECTED
SPECIMEN SOURCE: NORMAL

## 2022-07-25 ENCOUNTER — OFFICE VISIT (OUTPATIENT)
Dept: MEDICAL GROUP | Facility: LAB | Age: 67
End: 2022-07-25
Payer: OTHER GOVERNMENT

## 2022-07-25 VITALS
RESPIRATION RATE: 16 BRPM | SYSTOLIC BLOOD PRESSURE: 128 MMHG | WEIGHT: 149 LBS | TEMPERATURE: 97.7 F | HEIGHT: 66 IN | BODY MASS INDEX: 23.95 KG/M2 | OXYGEN SATURATION: 95 % | HEART RATE: 96 BPM | DIASTOLIC BLOOD PRESSURE: 78 MMHG

## 2022-07-25 DIAGNOSIS — F41.9 ANXIETY: ICD-10-CM

## 2022-07-25 DIAGNOSIS — F43.9 STRESS: ICD-10-CM

## 2022-07-25 PROCEDURE — 99213 OFFICE O/P EST LOW 20 MIN: CPT | Performed by: NURSE PRACTITIONER

## 2022-07-25 RX ORDER — CLONIDINE HYDROCHLORIDE 0.1 MG/1
0.1 TABLET ORAL 2 TIMES DAILY PRN
Qty: 60 TABLET | Refills: 1 | Status: SHIPPED | OUTPATIENT
Start: 2022-07-25 | End: 2023-05-05 | Stop reason: SDUPTHER

## 2022-07-25 RX ORDER — FLUOXETINE HYDROCHLORIDE 20 MG/1
CAPSULE ORAL
Qty: 60 CAPSULE | Refills: 1 | Status: SHIPPED | OUTPATIENT
Start: 2022-07-25 | End: 2022-09-15 | Stop reason: SDUPTHER

## 2022-07-25 ASSESSMENT — FIBROSIS 4 INDEX: FIB4 SCORE: 0.75

## 2022-09-15 ENCOUNTER — OFFICE VISIT (OUTPATIENT)
Dept: MEDICAL GROUP | Facility: LAB | Age: 67
End: 2022-09-15
Payer: MEDICARE

## 2022-09-15 VITALS
HEIGHT: 66 IN | TEMPERATURE: 97.7 F | RESPIRATION RATE: 16 BRPM | SYSTOLIC BLOOD PRESSURE: 128 MMHG | HEART RATE: 78 BPM | BODY MASS INDEX: 21.38 KG/M2 | OXYGEN SATURATION: 94 % | DIASTOLIC BLOOD PRESSURE: 70 MMHG | WEIGHT: 133 LBS

## 2022-09-15 DIAGNOSIS — Z79.891 CHRONIC USE OF OPIATE FOR THERAPEUTIC PURPOSE: ICD-10-CM

## 2022-09-15 DIAGNOSIS — F43.9 STRESS: ICD-10-CM

## 2022-09-15 DIAGNOSIS — F41.9 ANXIETY: ICD-10-CM

## 2022-09-15 DIAGNOSIS — M48.062 SPINAL STENOSIS OF LUMBAR REGION WITH NEUROGENIC CLAUDICATION: ICD-10-CM

## 2022-09-15 DIAGNOSIS — Z23 NEED FOR PNEUMOCOCCAL VACCINATION: ICD-10-CM

## 2022-09-15 DIAGNOSIS — G89.29 CHRONIC LEFT HIP PAIN: ICD-10-CM

## 2022-09-15 DIAGNOSIS — M25.552 CHRONIC LEFT HIP PAIN: ICD-10-CM

## 2022-09-15 DIAGNOSIS — G25.81 RESTLESS LEG SYNDROME: ICD-10-CM

## 2022-09-15 PROCEDURE — 90677 PCV20 VACCINE IM: CPT | Performed by: NURSE PRACTITIONER

## 2022-09-15 PROCEDURE — 99214 OFFICE O/P EST MOD 30 MIN: CPT | Mod: 25 | Performed by: NURSE PRACTITIONER

## 2022-09-15 PROCEDURE — G0009 ADMIN PNEUMOCOCCAL VACCINE: HCPCS | Performed by: NURSE PRACTITIONER

## 2022-09-15 RX ORDER — GABAPENTIN 300 MG/1
300-900 CAPSULE ORAL EVERY EVENING
Qty: 270 CAPSULE | Refills: 3 | Status: SHIPPED | OUTPATIENT
Start: 2022-09-15 | End: 2023-09-20

## 2022-09-15 RX ORDER — HYDROCODONE BITARTRATE AND ACETAMINOPHEN 7.5; 325 MG/1; MG/1
1 TABLET ORAL 2 TIMES DAILY PRN
Qty: 60 TABLET | Refills: 0 | Status: SHIPPED | OUTPATIENT
Start: 2022-11-22 | End: 2022-12-08 | Stop reason: SDUPTHER

## 2022-09-15 RX ORDER — HYDROCODONE BITARTRATE AND ACETAMINOPHEN 7.5; 325 MG/1; MG/1
1 TABLET ORAL 2 TIMES DAILY PRN
Qty: 60 TABLET | Refills: 0 | Status: SHIPPED | OUTPATIENT
Start: 2022-09-23 | End: 2022-12-08 | Stop reason: SDUPTHER

## 2022-09-15 RX ORDER — FLUOXETINE HYDROCHLORIDE 40 MG/1
40 CAPSULE ORAL DAILY
Qty: 90 CAPSULE | Refills: 3 | Status: SHIPPED | OUTPATIENT
Start: 2022-09-15 | End: 2023-10-30

## 2022-09-15 RX ORDER — HYDROCODONE BITARTRATE AND ACETAMINOPHEN 7.5; 325 MG/1; MG/1
1 TABLET ORAL 2 TIMES DAILY PRN
Qty: 60 TABLET | Refills: 0 | Status: SHIPPED | OUTPATIENT
Start: 2022-10-23 | End: 2022-11-22

## 2022-09-15 ASSESSMENT — FIBROSIS 4 INDEX: FIB4 SCORE: 0.77

## 2022-09-15 NOTE — PROGRESS NOTES
CC  F/u      HPI:  Penelope is a 66-year-old established female here to follow-up on chronic pain  / stress / depression.   #1- Chronic pain:  Left low back, radiating down buttocks and right trunk from previous surgery.  Also suffers from chronic hip pain.  Has restless leg syndrome which is controlled with gabapentin at night and needs this refilled.  Last dose of controlled substance: yesterday  Chronic pain treated with hydrocodone taken twice a day     She  reports no history of alcohol use.  She  reports no history of drug use.     Interval history: She is seen here every 3 months  Any major change in health since last appointment? Yes As above, stressors     Consequences of Chronic Opiate therapy:  (5 A's)  Analgesia: Compared to no treatment or prior treatment, pain is currently worsened  Activity: not changed  Adverse Events: She denies constipation, dry mouth, itchy skin, nausea and sedation  Aberrant Behaviors: She reports she is taking medication as prescribed and is not veering from agreed treatment regimen or provider recommendations. There have been no inappropriate refills or lost/stolen meds reported.  Affect/Mood: Pain is impacting patient's mood.  Patient reports anxiety.     Nonnarcotic treatments that are being used: Tylenol, Gabapentin, SSRI/SNRI, ice and heat.      Opioid Risk Score: 2     Interpretation of Opioid Risk Score   Score 0-3 = Low risk of abuse. Do UDS at least once per year.  Score 4-7 = Moderate risk of abuse. Do UDS 1-4 times per year.  Score 8+ = High risk of abuse. Refer to specialist.       #2-stress/anxiety/depression: Has been under a lot of stress in relationship to her mother's health, newly going into an assisted living and her mother's memory.  Prozac has been very helpful and she is taking 40 mg.  Also sleeps well at night with trazodone.      Exam:  /70 (BP Location: Left arm, Patient Position: Sitting, BP Cuff Size: Adult)   Pulse 78   Temp 36.5 °C (97.7 °F)    "Resp 16   Ht 1.676 m (5' 6\")   Wt 60.3 kg (133 lb)   SpO2 94%    Gen. appears healthy in no distress   Skin appropriate for sex and age   Neck trachea is midline  Lungs unlabored breathing  Heart regular rate  Neuro gait and station normal   Psych appropriate, calm, interactive, well-groomed      A/P:  \"  1. Spinal stenosis of lumbar region with neurogenic claudication  HYDROcodone-acetaminophen (NORCO) 7.5-325 MG tab    HYDROcodone-acetaminophen (NORCO) 7.5-325 MG tab    HYDROcodone-acetaminophen (NORCO) 7.5-325 MG tab      2. Chronic left hip pain  HYDROcodone-acetaminophen (NORCO) 7.5-325 MG tab    HYDROcodone-acetaminophen (NORCO) 7.5-325 MG tab    HYDROcodone-acetaminophen (NORCO) 7.5-325 MG tab      3. Restless leg syndrome  gabapentin (NEURONTIN) 300 MG Cap      4. Stress  FLUoxetine (PROZAC) 40 MG capsule      5. Anxiety  FLUoxetine (PROZAC) 40 MG capsule      6. Need for pneumococcal vaccination  Pneumococcal Conjugate Vaccine 20-Valent (19 yrs+)      7. Chronic use of opiate for therapeutic purpose        \"  Pain controlled.    Overall impression that this patient is benefiting from opioid therapy and that the benefits outweigh the risks of continued use. yes   - Controlled Substance Use Agreement current  - Urine drug screen current and reviewed / compliant with rx medications  - Additional lab: CMP to assess liver and renal function - UTD   - Rx refill prescriptions are done for 3 months, No early refills.   - Referral to pain management no  In prescribing controlled substances to this patient, I certify that I have obtained and reviewed the medical history of Penelope Bautista. I have also made a good abelardo effort to obtain applicable records from other providers who have treated the patient and records did not demonstrate any increased risk of substance abuse that would prevent me from prescribing controlled substances.     I have conducted a physical exam and documented it. I have reviewed Ms. " Michele’s prescription history as maintained by the [place] Prescription Monitoring Program.     I have assessed the patient’s risk for abuse, dependency, and addiction using the validated Opioid Risk Tool available at https://www.mdcalc.com/dyujqr-wiqf-zoqk-ort-narcotic-abuse.     Given the above, I believe the benefits of controlled substance therapy outweigh the risks. The reasons for prescribing controlled substances include non-narcotic, oral analgesic alternatives have been inadequate for pain control. Accordingly, I have discussed the risk and benefits, treatment plan, and alternative therapies with the patient.      Refilled gabapentin, RLS stable.    Refilled Prozac.  Moods stable.    PCV 20 updated today.    Follow-up 3 months.

## 2022-11-03 ENCOUNTER — PATIENT MESSAGE (OUTPATIENT)
Dept: HEALTH INFORMATION MANAGEMENT | Facility: OTHER | Age: 67
End: 2022-11-03

## 2022-12-08 ENCOUNTER — OFFICE VISIT (OUTPATIENT)
Dept: MEDICAL GROUP | Facility: LAB | Age: 67
End: 2022-12-08
Payer: MEDICARE

## 2022-12-08 VITALS
DIASTOLIC BLOOD PRESSURE: 70 MMHG | TEMPERATURE: 97.5 F | RESPIRATION RATE: 16 BRPM | SYSTOLIC BLOOD PRESSURE: 130 MMHG | WEIGHT: 131 LBS | OXYGEN SATURATION: 96 % | HEIGHT: 66 IN | HEART RATE: 82 BPM | BODY MASS INDEX: 21.05 KG/M2

## 2022-12-08 DIAGNOSIS — M25.552 CHRONIC LEFT HIP PAIN: ICD-10-CM

## 2022-12-08 DIAGNOSIS — Z79.891 CHRONIC USE OF OPIATE FOR THERAPEUTIC PURPOSE: ICD-10-CM

## 2022-12-08 DIAGNOSIS — R19.7 DIARRHEA, UNSPECIFIED TYPE: ICD-10-CM

## 2022-12-08 DIAGNOSIS — G89.29 CHRONIC LEFT HIP PAIN: ICD-10-CM

## 2022-12-08 DIAGNOSIS — F33.1 MODERATE EPISODE OF RECURRENT MAJOR DEPRESSIVE DISORDER (HCC): ICD-10-CM

## 2022-12-08 DIAGNOSIS — M48.062 SPINAL STENOSIS OF LUMBAR REGION WITH NEUROGENIC CLAUDICATION: ICD-10-CM

## 2022-12-08 PROCEDURE — 99214 OFFICE O/P EST MOD 30 MIN: CPT | Performed by: NURSE PRACTITIONER

## 2022-12-08 RX ORDER — ARIPIPRAZOLE 5 MG/1
TABLET ORAL
Qty: 45 TABLET | Refills: 2 | Status: SHIPPED | OUTPATIENT
Start: 2022-12-08 | End: 2023-06-08 | Stop reason: SDUPTHER

## 2022-12-08 RX ORDER — HYDROCODONE BITARTRATE AND ACETAMINOPHEN 7.5; 325 MG/1; MG/1
1 TABLET ORAL 2 TIMES DAILY PRN
Qty: 60 TABLET | Refills: 0 | Status: SHIPPED | OUTPATIENT
Start: 2022-12-22 | End: 2023-03-02 | Stop reason: SDUPTHER

## 2022-12-08 RX ORDER — DICYCLOMINE HYDROCHLORIDE 10 MG/1
10 CAPSULE ORAL
Qty: 120 CAPSULE | Refills: 1 | Status: SHIPPED | OUTPATIENT
Start: 2022-12-08

## 2022-12-08 RX ORDER — HYDROCODONE BITARTRATE AND ACETAMINOPHEN 7.5; 325 MG/1; MG/1
1 TABLET ORAL 2 TIMES DAILY PRN
Qty: 60 TABLET | Refills: 0 | Status: SHIPPED | OUTPATIENT
Start: 2023-01-21 | End: 2023-03-02 | Stop reason: SDUPTHER

## 2022-12-08 RX ORDER — HYDROCODONE BITARTRATE AND ACETAMINOPHEN 7.5; 325 MG/1; MG/1
1 TABLET ORAL 2 TIMES DAILY PRN
Qty: 60 TABLET | Refills: 0 | Status: SHIPPED | OUTPATIENT
Start: 2023-02-20 | End: 2023-03-02 | Stop reason: SDUPTHER

## 2022-12-08 ASSESSMENT — FIBROSIS 4 INDEX: FIB4 SCORE: 0.77

## 2022-12-08 NOTE — PROGRESS NOTES
Chief Complaint   Patient presents with    Chronic Opiate Therapy       HPI:  Jadon is a 67-year-old established female here with a couple of issues.  She is joined by her  Paul today.    #1- diarrhea:  present x 4-5 mo.  Has been under a lot of stress - worst year of life, per patient.  Watery diarrhea / non-bloody.  Occurs up to 5 x per day and periodically wakes her up in the middle of the night.  She has lost a little bit of weight.  Tried imodium but has to take 4-5 to help.   Due for colonoscopy.  Taking metamucil which helped last week.  No other associated symptoms.    #2-chronic pain: Suffers from chronic pain in her low back that radiates down her right buttocks.  Also has chronic pain in her right trunk related to lung surgery a few years ago.  Takes 2 hydrocodone per day which she feels sufficiently controls her pain.  Start physical therapy, physiatry and has no interest in any further treatment right now.  Denies constipation with pain pills, see above as she is actually having diarrhea.  Last dose of controlled substance: Today  Chronic pain treated with hydrocodone taken twice a day     She  reports no history of alcohol use.  She  reports no history of drug use.     Interval history: She is seen here every 3 months  Any major change in health since last appointment? Yes As above, stressors     Consequences of Chronic Opiate therapy:  (5 A's)  Analgesia: Compared to no treatment or prior treatment, pain is currently worsened  Activity: not changed  Adverse Events: She denies constipation, dry mouth, itchy skin, nausea and sedation  Aberrant Behaviors: She reports she is taking medication as prescribed and is not veering from agreed treatment regimen or provider recommendations. There have been no inappropriate refills or lost/stolen meds reported.  Affect/Mood: Pain is impacting patient's mood.  Patient reports anxiety.     Nonnarcotic treatments that are being used: Tylenol, Gabapentin,  "SSRI/SNRI, ice and heat.      Opioid Risk Score: 2     Interpretation of Opioid Risk Score   Score 0-3 = Low risk of abuse. Do UDS at least once per year.  Score 4-7 = Moderate risk of abuse. Do UDS 1-4 times per year.  Score 8+ = High risk of abuse. Refer to specialist.    Exam:  /70 (BP Location: Left arm, Patient Position: Sitting, BP Cuff Size: Adult)   Pulse 82   Temp 36.4 °C (97.5 °F)   Resp 16   Ht 1.676 m (5' 6\")   Wt 59.4 kg (131 lb)   SpO2 96%   Gen: NAD  Resp: nonlabored.  Able to speak in full sentences  Psy: pleasant / cooperative.   Neuro:  Alert and oriented x 3    A/P:  1. Diarrhea, unspecified type  dicyclomine (BENTYL) 10 MG Cap      2. Spinal stenosis of lumbar region with neurogenic claudication  HYDROcodone-acetaminophen (NORCO) 7.5-325 MG tab    HYDROcodone-acetaminophen (NORCO) 7.5-325 MG tab    HYDROcodone-acetaminophen (NORCO) 7.5-325 MG tab      3. Chronic left hip pain  HYDROcodone-acetaminophen (NORCO) 7.5-325 MG tab    HYDROcodone-acetaminophen (NORCO) 7.5-325 MG tab    HYDROcodone-acetaminophen (NORCO) 7.5-325 MG tab      4. Moderate episode of recurrent major depressive disorder (HCC)        5. Chronic use of opiate for therapeutic purpose        15 MME/day.  Quality of life maintained.  Insufficient control with Tylenol and ibuprofen.  Overall impression that this patient is benefiting from opioid therapy and that the benefits outweigh the risks of continued use. yes   - Controlled Substance Use Agreement current  - Urine drug screen current and reviewed / compliant with rx medications  - Additional lab: CMP to assess liver and renal function - UTD   - Rx refill prescriptions are done for 3 months, No early refills.   - Referral to pain management no    In prescribing controlled substances to this patient, I certify that I have obtained and reviewed the medical history of Penelope Bautista. I have also made a good abelardo effort to obtain applicable records from other " providers who have treated the patient and records did not demonstrate any increased risk of substance abuse that would prevent me from prescribing controlled substances.     I have conducted a physical exam and documented it. I have reviewed Ms. Bautista’s prescription history as maintained by the [place] Prescription Monitoring Program.     I have assessed the patient’s risk for abuse, dependency, and addiction using the validated Opioid Risk Tool available at https://www.mdcalc.com/pqhjdn-fmtg-bpnl-ort-narcotic-abuse.     Given the above, I believe the benefits of controlled substance therapy outweigh the risks. The reasons for prescribing controlled substances include non-narcotic, oral analgesic alternatives have been inadequate for pain control. Accordingly, I have discussed the risk and benefits, treatment plan, and alternative therapies with the patient.     In terms of diarrhea, reiterated the importance of following her gastroenterologist to schedule colonoscopy which she is coincidently due for anyway related to history of colon polyps.  Suspect that diarrhea is mostly related to IBS/current stressors and recommend a trial of 10 mg dicyclomine up to 4 times daily.  May continue Metamucil.  Discussed healthy eating and high water intake.  She will let me know if dicyclomine is not helping her.    She is obviously under a lot of stress and suffering from depression.  She would like to restart Abilify which has been extremely helpful for her in the past.  She preferred to start on 5 mg and will increase after 2 weeks if tolerated.  Reminded her of potential weight gain, length of onset efficacy and side effects of Abilify.  She will continue on fluoxetine.  She denies SI or HI.  Has a very supportive family.  I will definitely see her back here in 3 months, prior to if she is not improving.

## 2023-01-11 ENCOUNTER — PATIENT MESSAGE (OUTPATIENT)
Dept: MEDICAL GROUP | Facility: LAB | Age: 68
End: 2023-01-11
Payer: MEDICARE

## 2023-01-11 DIAGNOSIS — J06.9 ACUTE URI: ICD-10-CM

## 2023-01-11 RX ORDER — ALBUTEROL SULFATE 2.5 MG/3ML
2.5 SOLUTION RESPIRATORY (INHALATION) EVERY 4 HOURS PRN
Qty: 75 EACH | Refills: 5 | Status: SHIPPED | OUTPATIENT
Start: 2023-01-11

## 2023-01-11 RX ORDER — METHYLPREDNISOLONE 4 MG/1
TABLET ORAL
Qty: 21 TABLET | Refills: 0 | Status: SHIPPED | OUTPATIENT
Start: 2023-01-11 | End: 2023-05-25

## 2023-01-19 ENCOUNTER — PATIENT MESSAGE (OUTPATIENT)
Dept: MEDICAL GROUP | Facility: LAB | Age: 68
End: 2023-01-19
Payer: MEDICARE

## 2023-01-19 DIAGNOSIS — J45.40 MODERATE PERSISTENT ASTHMA WITHOUT COMPLICATION: ICD-10-CM

## 2023-01-19 RX ORDER — FLUTICASONE PROPIONATE AND SALMETEROL 50; 250 UG/1; UG/1
2 POWDER RESPIRATORY (INHALATION) EVERY 12 HOURS
COMMUNITY
End: 2023-01-19 | Stop reason: SDUPTHER

## 2023-01-19 RX ORDER — FLUTICASONE PROPIONATE AND SALMETEROL 50; 250 UG/1; UG/1
1 POWDER RESPIRATORY (INHALATION) EVERY 12 HOURS
Qty: 60 EACH | Refills: 3 | Status: SHIPPED | OUTPATIENT
Start: 2023-01-19 | End: 2023-01-30 | Stop reason: SDUPTHER

## 2023-01-19 RX ORDER — ALBUTEROL SULFATE 90 MCG
2 HFA AEROSOL WITH ADAPTER (GRAM) INHALATION EVERY 6 HOURS PRN
COMMUNITY
End: 2023-11-15

## 2023-01-30 RX ORDER — FLUTICASONE PROPIONATE AND SALMETEROL 50; 250 UG/1; UG/1
1 POWDER RESPIRATORY (INHALATION) EVERY 12 HOURS
Qty: 3 EACH | Refills: 3 | Status: SHIPPED | OUTPATIENT
Start: 2023-01-30 | End: 2023-04-24 | Stop reason: SDUPTHER

## 2023-03-02 ENCOUNTER — OFFICE VISIT (OUTPATIENT)
Dept: MEDICAL GROUP | Facility: LAB | Age: 68
End: 2023-03-02
Payer: MEDICARE

## 2023-03-02 VITALS
OXYGEN SATURATION: 95 % | DIASTOLIC BLOOD PRESSURE: 80 MMHG | TEMPERATURE: 97.2 F | BODY MASS INDEX: 22.66 KG/M2 | SYSTOLIC BLOOD PRESSURE: 154 MMHG | WEIGHT: 141 LBS | HEART RATE: 76 BPM | HEIGHT: 66 IN | RESPIRATION RATE: 12 BRPM

## 2023-03-02 DIAGNOSIS — M48.062 SPINAL STENOSIS OF LUMBAR REGION WITH NEUROGENIC CLAUDICATION: ICD-10-CM

## 2023-03-02 DIAGNOSIS — Z78.0 ASYMPTOMATIC MENOPAUSE: ICD-10-CM

## 2023-03-02 DIAGNOSIS — F51.01 PRIMARY INSOMNIA: ICD-10-CM

## 2023-03-02 DIAGNOSIS — M25.552 CHRONIC LEFT HIP PAIN: ICD-10-CM

## 2023-03-02 DIAGNOSIS — G89.29 CHRONIC LEFT HIP PAIN: ICD-10-CM

## 2023-03-02 DIAGNOSIS — Z79.891 CHRONIC USE OF OPIATE FOR THERAPEUTIC PURPOSE: ICD-10-CM

## 2023-03-02 DIAGNOSIS — Z12.31 SCREENING MAMMOGRAM FOR BREAST CANCER: ICD-10-CM

## 2023-03-02 DIAGNOSIS — R19.7 DIARRHEA, UNSPECIFIED TYPE: ICD-10-CM

## 2023-03-02 PROCEDURE — 99214 OFFICE O/P EST MOD 30 MIN: CPT | Performed by: NURSE PRACTITIONER

## 2023-03-02 RX ORDER — HYDROCODONE BITARTRATE AND ACETAMINOPHEN 7.5; 325 MG/1; MG/1
1 TABLET ORAL 2 TIMES DAILY PRN
Qty: 60 TABLET | Refills: 0 | Status: SHIPPED | OUTPATIENT
Start: 2023-03-22 | End: 2023-06-08 | Stop reason: SDUPTHER

## 2023-03-02 RX ORDER — HYDROCODONE BITARTRATE AND ACETAMINOPHEN 7.5; 325 MG/1; MG/1
1 TABLET ORAL 2 TIMES DAILY PRN
Qty: 60 TABLET | Refills: 0 | Status: SHIPPED | OUTPATIENT
Start: 2023-05-21 | End: 2023-06-08 | Stop reason: SDUPTHER

## 2023-03-02 RX ORDER — TRAZODONE HYDROCHLORIDE 50 MG/1
50 TABLET ORAL EVERY EVENING
Qty: 90 TABLET | Refills: 3 | Status: SHIPPED | OUTPATIENT
Start: 2023-03-02 | End: 2023-06-08 | Stop reason: SDUPTHER

## 2023-03-02 RX ORDER — HYDROCODONE BITARTRATE AND ACETAMINOPHEN 7.5; 325 MG/1; MG/1
1 TABLET ORAL 2 TIMES DAILY PRN
Qty: 60 TABLET | Refills: 0 | Status: SHIPPED | OUTPATIENT
Start: 2023-04-21 | End: 2023-06-08 | Stop reason: SDUPTHER

## 2023-03-02 RX ORDER — QUETIAPINE FUMARATE 50 MG/1
50 TABLET, FILM COATED ORAL EVERY EVENING
Qty: 90 TABLET | Refills: 3 | Status: SHIPPED | OUTPATIENT
Start: 2023-03-02 | End: 2023-06-08 | Stop reason: SDUPTHER

## 2023-03-02 ASSESSMENT — FIBROSIS 4 INDEX: FIB4 SCORE: 0.77

## 2023-03-02 ASSESSMENT — PATIENT HEALTH QUESTIONNAIRE - PHQ9: CLINICAL INTERPRETATION OF PHQ2 SCORE: 0

## 2023-03-02 NOTE — PROGRESS NOTES
Chief Complaint   Patient presents with    Chronic Opiate Therapy       HPI:  #1-chronic pain: Has chronic pain in low back that radiates Also has chronic pain in her right trunk related to lung surgery a few years ago.  Takes 2 hydrocodone per day which she feels sufficiently controls her pain.  Has tried physical therapy, physiatry and has no interest in any further treatment right now.  Last dose of controlled substance: Today  Chronic pain treated with hydrocodone taken twice a day     She  reports no history of alcohol use.  She  reports no history of drug use.     Interval history: She is seen here every 3 months  Any major change in health since last appointment? Yes As above, stressors     Consequences of Chronic Opiate therapy:  (5 A's)  Analgesia: Compared to no treatment or prior treatment, pain is currently worsened  Activity: not changed  Adverse Events: She denies constipation, dry mouth, itchy skin, nausea and sedation  Aberrant Behaviors: She reports she is taking medication as prescribed and is not veering from agreed treatment regimen or provider recommendations. There have been no inappropriate refills or lost/stolen meds reported.  Affect/Mood: Pain is impacting patient's mood.  Patient reports anxiety.     Nonnarcotic treatments that are being used: Tylenol, Gabapentin, SSRI/SNRI, ice and heat.      Opioid Risk Score: 2     Interpretation of Opioid Risk Score   Score 0-3 = Low risk of abuse. Do UDS at least once per year.  Score 4-7 = Moderate risk of abuse. Do UDS 1-4 times per year.  Score 8+ = High risk of abuse. Refer to specialist.    #2- diarrhea:  persistent. has endoscopy / colonoscopy 3/14/2023.  Diarrhea is not bloody / black.  Appetite is improving. No further weight loss.     3.  Sleep disturbance: Stable with Seroquel and trazodone.    Exam:  BP (!) 154/80 (BP Location: Left arm, Patient Position: Sitting, BP Cuff Size: Adult)   Pulse 76   Temp 36.2 °C (97.2 °F)   Resp 12   Ht  "1.676 m (5' 6\")   Wt 64 kg (141 lb)   SpO2 95%   Gen: NAD  Resp: nonlabored.  Able to speak in full sentences  Psy: pleasant / cooperative.   Neuro:  Alert and oriented x 3    A/P:  \"  1. Spinal stenosis of lumbar region with neurogenic claudication  HYDROcodone-acetaminophen (NORCO) 7.5-325 MG tab    HYDROcodone-acetaminophen (NORCO) 7.5-325 MG tab    HYDROcodone-acetaminophen (NORCO) 7.5-325 MG tab      2. Chronic left hip pain  HYDROcodone-acetaminophen (NORCO) 7.5-325 MG tab    HYDROcodone-acetaminophen (NORCO) 7.5-325 MG tab    HYDROcodone-acetaminophen (NORCO) 7.5-325 MG tab      3. Chronic use of opiate for therapeutic purpose        4. Asymptomatic menopause  DS-BONE DENSITY STUDY (DEXA)      5. Screening mammogram for breast cancer  MA-SCREENING MAMMO BILAT W/TOMOSYNTHESIS W/CAD      6. Diarrhea, unspecified type        7.   Sleep disturbance      Overall impression that this patient is benefiting from opioid therapy and that the benefits outweigh the risks of continued use. yes   - Controlled Substance Use Agreement current  - Urine drug screen current and reviewed / compliant with rx medications  - Additional lab: CMP to assess liver and renal function - UTD   - Rx refill prescriptions are done for 3 months, No early refills.   - Referral to pain management no  In prescribing controlled substances to this patient, I certify that I have obtained and reviewed the medical history of Penelope Bautista. I have also made a good abelardo effort to obtain applicable records from other providers who have treated the patient and records did not demonstrate any increased risk of substance abuse that would prevent me from prescribing controlled substances.     I have conducted a physical exam and documented it. I have reviewed Ms. Bautista’s prescription history as maintained by the [place] Prescription Monitoring Program.     I have assessed the patient’s risk for abuse, dependency, and addiction using the validated " Opioid Risk Tool available at https://www.mdcalc.com/ocbanz-ogzm-ikcc-ort-narcotic-abuse.     Given the above, I believe the benefits of controlled substance therapy outweigh the risks. The reasons for prescribing controlled substances include non-narcotic, oral analgesic alternatives have been inadequate for pain control. Accordingly, I have discussed the risk and benefits, treatment plan, and alternative therapies with the patient.      In terms of diarrhea, she is personally scheduled gastroenterology for endoscopy and colonoscopy in 2 weeks.  May continue using anything over-the-counter that seems to help such as Imodium.  Fortunately she has had a low bit of weight gain and her appetite is back.  Discussed ER for black or bloody stools.    Sleeping well with Seroquel and trazodone, both prescriptions were moved over to [facility] for her from SNF as per her request.    In terms of healthcare maintenance, recommend updated bone density and mammogram within the next 3 months.

## 2023-04-03 DIAGNOSIS — R09.02 HYPOXIA: ICD-10-CM

## 2023-04-20 DIAGNOSIS — J45.40 MODERATE PERSISTENT ASTHMA WITHOUT COMPLICATION: ICD-10-CM

## 2023-04-20 RX ORDER — ALBUTEROL SULFATE 90 UG/1
AEROSOL, METERED RESPIRATORY (INHALATION)
Qty: 25.5 G | Refills: 3 | Status: SHIPPED | OUTPATIENT
Start: 2023-04-20

## 2023-04-20 NOTE — TELEPHONE ENCOUNTER
Received request via: Pharmacy    Was the patient seen in the last year in this department? Yes  3/2/23  Does the patient have an active prescription (recently filled or refills available) for medication(s) requested? No    Does the patient have group home Plus and need 100 day supply (blood pressure, diabetes and cholesterol meds only)? Medication is not for cholesterol, blood pressure or diabetes

## 2023-04-24 ENCOUNTER — HOSPITAL ENCOUNTER (OUTPATIENT)
Dept: RADIOLOGY | Facility: MEDICAL CENTER | Age: 68
End: 2023-04-24
Attending: NURSE PRACTITIONER
Payer: MEDICARE

## 2023-04-24 DIAGNOSIS — Z12.31 SCREENING MAMMOGRAM FOR BREAST CANCER: ICD-10-CM

## 2023-04-24 DIAGNOSIS — Z78.0 ASYMPTOMATIC MENOPAUSE: ICD-10-CM

## 2023-04-24 PROCEDURE — 77063 BREAST TOMOSYNTHESIS BI: CPT

## 2023-04-24 PROCEDURE — 77080 DXA BONE DENSITY AXIAL: CPT

## 2023-04-24 RX ORDER — FLUTICASONE PROPIONATE AND SALMETEROL 50; 250 UG/1; UG/1
1 POWDER RESPIRATORY (INHALATION) EVERY 12 HOURS
Qty: 3 EACH | Refills: 3 | Status: SHIPPED | OUTPATIENT
Start: 2023-04-24 | End: 2023-07-03 | Stop reason: SDUPTHER

## 2023-04-24 NOTE — TELEPHONE ENCOUNTER
Received request via: Pharmacy    Was the patient seen in the last year in this department? Yes  3/2/23  Does the patient have an active prescription (recently filled or refills available) for medication(s) requested? No    Does the patient have half-way Plus and need 100 day supply (blood pressure, diabetes and cholesterol meds only)? Medication is not for cholesterol, blood pressure or diabetes

## 2023-05-05 DIAGNOSIS — F41.9 ANXIETY: ICD-10-CM

## 2023-05-05 DIAGNOSIS — F43.9 STRESS: ICD-10-CM

## 2023-05-05 RX ORDER — CLONIDINE HYDROCHLORIDE 0.1 MG/1
0.1 TABLET ORAL 2 TIMES DAILY PRN
Qty: 60 TABLET | Refills: 1 | Status: SHIPPED | OUTPATIENT
Start: 2023-05-05 | End: 2023-07-06

## 2023-06-08 ENCOUNTER — OFFICE VISIT (OUTPATIENT)
Dept: MEDICAL GROUP | Facility: LAB | Age: 68
End: 2023-06-08
Payer: MEDICARE

## 2023-06-08 VITALS
BODY MASS INDEX: 23.3 KG/M2 | WEIGHT: 145 LBS | SYSTOLIC BLOOD PRESSURE: 120 MMHG | HEART RATE: 84 BPM | DIASTOLIC BLOOD PRESSURE: 70 MMHG | TEMPERATURE: 97.5 F | RESPIRATION RATE: 12 BRPM | HEIGHT: 66 IN | OXYGEN SATURATION: 93 %

## 2023-06-08 DIAGNOSIS — F33.42 RECURRENT MAJOR DEPRESSIVE DISORDER, IN FULL REMISSION (HCC): ICD-10-CM

## 2023-06-08 DIAGNOSIS — K21.9 GASTROESOPHAGEAL REFLUX DISEASE WITHOUT ESOPHAGITIS: ICD-10-CM

## 2023-06-08 DIAGNOSIS — M48.062 SPINAL STENOSIS OF LUMBAR REGION WITH NEUROGENIC CLAUDICATION: ICD-10-CM

## 2023-06-08 DIAGNOSIS — G89.29 CHRONIC LEFT HIP PAIN: ICD-10-CM

## 2023-06-08 DIAGNOSIS — F41.9 ANXIETY: ICD-10-CM

## 2023-06-08 DIAGNOSIS — M25.552 CHRONIC LEFT HIP PAIN: ICD-10-CM

## 2023-06-08 DIAGNOSIS — Z79.891 CHRONIC USE OF OPIATE FOR THERAPEUTIC PURPOSE: ICD-10-CM

## 2023-06-08 DIAGNOSIS — F51.01 PRIMARY INSOMNIA: ICD-10-CM

## 2023-06-08 PROCEDURE — 3078F DIAST BP <80 MM HG: CPT | Performed by: NURSE PRACTITIONER

## 2023-06-08 PROCEDURE — 99214 OFFICE O/P EST MOD 30 MIN: CPT | Performed by: NURSE PRACTITIONER

## 2023-06-08 PROCEDURE — 3074F SYST BP LT 130 MM HG: CPT | Performed by: NURSE PRACTITIONER

## 2023-06-08 RX ORDER — HYDROCODONE BITARTRATE AND ACETAMINOPHEN 7.5; 325 MG/1; MG/1
1 TABLET ORAL 2 TIMES DAILY PRN
Qty: 60 TABLET | Refills: 0 | Status: SHIPPED | OUTPATIENT
Start: 2023-07-20 | End: 2023-08-17 | Stop reason: SDUPTHER

## 2023-06-08 RX ORDER — ARIPIPRAZOLE 5 MG/1
5 TABLET ORAL DAILY
Qty: 90 TABLET | Refills: 3 | Status: SHIPPED | OUTPATIENT
Start: 2023-06-08

## 2023-06-08 RX ORDER — TRAZODONE HYDROCHLORIDE 50 MG/1
50 TABLET ORAL EVERY EVENING
Qty: 90 TABLET | Refills: 3 | Status: SHIPPED | OUTPATIENT
Start: 2023-06-08

## 2023-06-08 RX ORDER — HYDROCODONE BITARTRATE AND ACETAMINOPHEN 7.5; 325 MG/1; MG/1
1 TABLET ORAL 2 TIMES DAILY PRN
Qty: 60 TABLET | Refills: 0 | Status: SHIPPED | OUTPATIENT
Start: 2023-08-19 | End: 2023-08-17 | Stop reason: SDUPTHER

## 2023-06-08 RX ORDER — OMEPRAZOLE 20 MG/1
20 CAPSULE, DELAYED RELEASE ORAL
Qty: 90 CAPSULE | Refills: 3 | Status: SHIPPED | OUTPATIENT
Start: 2023-06-08

## 2023-06-08 RX ORDER — QUETIAPINE FUMARATE 50 MG/1
50 TABLET, FILM COATED ORAL EVERY EVENING
Qty: 90 TABLET | Refills: 3 | Status: SHIPPED | OUTPATIENT
Start: 2023-06-08 | End: 2024-03-06

## 2023-06-08 RX ORDER — HYDROCODONE BITARTRATE AND ACETAMINOPHEN 7.5; 325 MG/1; MG/1
1 TABLET ORAL 2 TIMES DAILY PRN
Qty: 60 TABLET | Refills: 0 | Status: SHIPPED | OUTPATIENT
Start: 2023-06-20 | End: 2023-08-17 | Stop reason: SDUPTHER

## 2023-06-08 ASSESSMENT — FIBROSIS 4 INDEX: FIB4 SCORE: 0.77

## 2023-06-08 NOTE — PROGRESS NOTES
Chief Complaint   Patient presents with    Chronic Opiate Therapy       HPI:   Penelope is a 67-year-old established female here for chronic pain follow-up.  Denies any other specific complaints or concerns today.  Tells me that her breathing has been stable but not improving and she would like to know what she could do to improve her lung health.  She is using Advair twice daily and albuterol a few times per day.  She is not exercising.  She did quit smoking.  Also heartburn has been well controlled, taking omeprazole 20 to 40 mg daily and needs this refilled.  Mood/sleep stable with Prozac, Seroquel, trazodone and Abilify.  Needs Abilify refilled.    Chronic pain recheck for: chronic pain in hips and back (low back).  Neck always sore.  Both thumbs hurt. Knees are fine.  Denies shoulder pain.   Last dose of controlled substance: today  Chronic pain treated with norco taken twice a day    She  reports that she does not drink alcohol.  She  reports that she does not use drugs.    Interval history: she is seen here every 3 months.   Any major change in health since last appointment? No    Consequences of Chronic Opiate therapy:  (5 A's)  Analgesia: Compared to no treatment or prior treatment, pain is currently not changed  Activity: not changed  Adverse Events:denies constipation, dry mouth, itchy skin, nausea, and sedation  Aberrant Behaviors: She reports she is taking medication as prescribed and is not veering from agreed treatment regimen or provider recommendations. There have been no inappropriate refills or lost/stolen meds reported.  Affect/Mood: Pain is not impacting patient's mood.  Patient denies depression/anxiety.    Nonnarcotic treatments that are being used: muscle relaxers, Gabapentin, ice, and heat.       Opioid Risk Score: 1    Interpretation of Opioid Risk Score   Score 0-3 = Low risk of abuse. Do UDS at least once per year.  Score 4-7 = Moderate risk of abuse. Do UDS 1-4 times per year.  Score 8+ =  High risk of abuse. Refer to specialist.    Last order of CONTROLLED SUBSTANCE TREATMENT AGREEMENT was found on 2017 from Office Visit on 2017     UDS Summary                  URINE DRUG SCREEN Next Due 2019      Done 2018 [facility] PAIN MANAGEMENT SCREEN     Patient has more history with this topic...          Most recent UDS reviewed today and is consistent with prescribed medications.     I have reviewed the medical records, the Prescription Monitoring Program and I have determined that controlled substance treatment is medically indicated.         Past medical, surgical, family, and social history is reviewed and updated in Epic chart by me today.   Medications and allergies reviewed and updated in Epic chart by me today.     ROS:   As documented in history of present illness above    Exam:    Constitutional: Alert, no distress, plus 3 vital signs  Skin:  Warm, dry, no rashes invisible areas  Eye: Equal, round and reactive, conjunctiva clear  ENMT: Lips without lesions, good dentition, oropharynx clear    Neck: Trachea midline, no masses, no thyromegaly  Respiratory: Unlabored respiration, lungs clear to auscultation, no wheezes, no rhonchi  Cardiovascular: Normal rate and rhythm, no murmur, no edema  Abdomen: Soft, nontender, no masses or hepatosplenomegaly  Psych: Alert, pleasant, well-groomed, normal affect    Assessment/ Plan / Medical Decision makin. Chronic pain from:    DDD, OA, hip degeneration / inflammation.    2. Chronic use of opiate drugs therapeutic purposes  -Overall impression that this patient is benefiting from opioid therapy and that the benefits outweigh the risks of continued use. yes   -UDS and CSA updated today.  - Additional lab: CMP to assess liver and renal function - UTD   - Rx refill prescriptions are done for 3 months, No early refills.   - Referral to pain management no  In prescribing controlled substances to this patient, I certify that I have obtained  and reviewed the medical history.. I have also made a good abelardo effort to obtain applicable records from other providers who have treated the patient and records did not demonstrate any increased risk of substance abuse that would prevent me from prescribing controlled substances.     I have conducted a physical exam and documented it. I have reviewed her prescription history as maintained by the [place] Prescription Monitoring Program.     I have assessed the patient’s risk for abuse, dependency, and addiction using the validated Opioid Risk Tool available at https://www.[employer].com/wlsywk-ufob-dafb-ort-narcotic-abuse.     Given the above, I believe the benefits of controlled substance therapy outweigh the risks. The reasons for prescribing controlled substances include non-narcotic, oral analgesic alternatives have been inadequate for pain control. Accordingly, I have discussed the risk and benefits, treatment plan, and alternative therapies with the patient.     3.  GERD: Controlled.  Refilled omeprazole.    4.  Anxiety, depression with insomnia: Controlled.  Refilled Abilify.

## 2023-06-19 DIAGNOSIS — F51.01 PRIMARY INSOMNIA: ICD-10-CM

## 2023-06-19 RX ORDER — PROPRANOLOL HYDROCHLORIDE 10 MG/1
TABLET ORAL
Qty: 180 TABLET | Refills: 3 | Status: SHIPPED | OUTPATIENT
Start: 2023-06-19

## 2023-07-03 RX ORDER — FLUTICASONE PROPIONATE AND SALMETEROL 50; 250 UG/1; UG/1
1 POWDER RESPIRATORY (INHALATION) EVERY 12 HOURS
Qty: 3 EACH | Refills: 3 | Status: SHIPPED | OUTPATIENT
Start: 2023-07-03 | End: 2023-07-06 | Stop reason: SDUPTHER

## 2023-07-06 DIAGNOSIS — F41.9 ANXIETY: ICD-10-CM

## 2023-07-06 DIAGNOSIS — F43.9 STRESS: ICD-10-CM

## 2023-07-06 RX ORDER — CLONIDINE HYDROCHLORIDE 0.1 MG/1
TABLET ORAL
Qty: 60 TABLET | Refills: 1 | Status: SHIPPED | OUTPATIENT
Start: 2023-07-06 | End: 2023-07-06

## 2023-07-06 RX ORDER — FLUTICASONE PROPIONATE AND SALMETEROL 50; 250 UG/1; UG/1
1 POWDER RESPIRATORY (INHALATION) EVERY 12 HOURS
Qty: 3 EACH | Refills: 0 | Status: SHIPPED | OUTPATIENT
Start: 2023-07-06 | End: 2023-08-17 | Stop reason: SDUPTHER

## 2023-07-06 RX ORDER — CLONIDINE HYDROCHLORIDE 0.1 MG/1
TABLET ORAL
Qty: 180 TABLET | Refills: 0 | Status: SHIPPED | OUTPATIENT
Start: 2023-07-06 | End: 2023-08-16

## 2023-07-06 NOTE — TELEPHONE ENCOUNTER
Received request via: Pharmacy    Was the patient seen in the last year in this department? Yes  LOV : 6/8/2023  Does the patient have an active prescription (recently filled or refills available) for medication(s) requested? Yes.   Does the patient have long-term Plus and need 100 day supply (blood pressure, diabetes and cholesterol meds only)? Patient does not have SCP

## 2023-07-06 NOTE — TELEPHONE ENCOUNTER
Received request via: Pharmacy    Was the patient seen in the last year in this department? Yes  LOV 06/08/2023  Does the patient have an active prescription (recently filled or refills available) for medication(s) requested? No    Does the patient have skilled nursing Plus and need 100 day supply (blood pressure, diabetes and cholesterol meds only)? Medication is not for cholesterol, blood pressure or diabetes and Patient does not have SCP

## 2023-07-25 ENCOUNTER — OFFICE VISIT (OUTPATIENT)
Dept: MEDICAL GROUP | Facility: LAB | Age: 68
End: 2023-07-25
Payer: MEDICARE

## 2023-07-25 ENCOUNTER — HOSPITAL ENCOUNTER (OUTPATIENT)
Facility: MEDICAL CENTER | Age: 68
End: 2023-07-25
Attending: NURSE PRACTITIONER
Payer: MEDICARE

## 2023-07-25 VITALS
SYSTOLIC BLOOD PRESSURE: 142 MMHG | RESPIRATION RATE: 14 BRPM | BODY MASS INDEX: 23.3 KG/M2 | HEIGHT: 66 IN | OXYGEN SATURATION: 92 % | DIASTOLIC BLOOD PRESSURE: 70 MMHG | WEIGHT: 145 LBS | TEMPERATURE: 97.2 F | HEART RATE: 78 BPM

## 2023-07-25 DIAGNOSIS — R30.0 DYSURIA: ICD-10-CM

## 2023-07-25 LAB
APPEARANCE UR: NORMAL
BILIRUB UR STRIP-MCNC: NEGATIVE MG/DL
COLOR UR AUTO: YELLOW
GLUCOSE UR STRIP.AUTO-MCNC: NEGATIVE MG/DL
KETONES UR STRIP.AUTO-MCNC: NEGATIVE MG/DL
LEUKOCYTE ESTERASE UR QL STRIP.AUTO: NEGATIVE
NITRITE UR QL STRIP.AUTO: NEGATIVE
PH UR STRIP.AUTO: 6.5 [PH] (ref 5–8)
PROT UR QL STRIP: NEGATIVE MG/DL
RBC UR QL AUTO: NEGATIVE
SP GR UR STRIP.AUTO: 1.02
UROBILINOGEN UR STRIP-MCNC: 0.2 MG/DL

## 2023-07-25 PROCEDURE — 87086 URINE CULTURE/COLONY COUNT: CPT

## 2023-07-25 PROCEDURE — 99213 OFFICE O/P EST LOW 20 MIN: CPT | Performed by: NURSE PRACTITIONER

## 2023-07-25 PROCEDURE — 3078F DIAST BP <80 MM HG: CPT | Performed by: NURSE PRACTITIONER

## 2023-07-25 PROCEDURE — 81002 URINALYSIS NONAUTO W/O SCOPE: CPT | Performed by: NURSE PRACTITIONER

## 2023-07-25 PROCEDURE — 3077F SYST BP >= 140 MM HG: CPT | Performed by: NURSE PRACTITIONER

## 2023-07-25 RX ORDER — SULFAMETHOXAZOLE AND TRIMETHOPRIM 800; 160 MG/1; MG/1
1 TABLET ORAL 2 TIMES DAILY
Qty: 6 TABLET | Refills: 0 | Status: SHIPPED | OUTPATIENT
Start: 2023-07-25 | End: 2023-11-15

## 2023-07-25 ASSESSMENT — FIBROSIS 4 INDEX: FIB4 SCORE: 0.77

## 2023-07-25 NOTE — PROGRESS NOTES
"Subjective:     CC:   Chief Complaint   Patient presents with    UTI     Burning , urgency      HPI:   Penelope presents today with the following:    UTI  Onset several days ago. Reports dysuria, urgency, frequency, strong urine odor, incontinence, nocturia.   Denies hematuria, vaginal discharge, itching, fever, chills, N/V, flank pain.   Has not been taking any OTC medications.  Last UTI was about a month ago and she was treated at [facility] ER, completed a course of antibiotics, but does not remember what they were. Reports that symptoms are the same.     ROS:   As documented in history of present illness above    Objective:     Exam: BP (!) 142/70 (BP Location: Right arm, Patient Position: Sitting, BP Cuff Size: Adult)   Pulse 78   Temp 36.2 °C (97.2 °F)   Resp 14   Ht 1.676 m (5' 6\")   Wt 65.8 kg (145 lb)   SpO2 92%  Body mass index is 23.4 kg/m².    Constitutional: Alert, no distress, well-groomed.  Skin: Warm, dry, good turgor, no rashes in visible areas.  Eye: Equal, round and reactive, conjunctiva clear, lids normal.  ENMT: Lips without lesions, good dentition, moist mucous membranes.  Neck: Trachea midline, no masses, no thyromegaly.  Respiratory: Unlabored respiratory effort, no cough.  MSK: Normal gait, moves all extremities.  Neuro: Grossly non-focal.   Psych: Alert and oriented x3, normal affect and mood.    Assessment & Plan:     67 y.o. female with the following -     1. Dysuria  Send urine for culture. Patient to take antibiotics as prescribed. Return to clinic if symptoms not improving within 3-4 days or in case of vomiting, fever, increasing pain. Discussed drinking plenty of fluids, wiping front to back every void and BM.  - sulfamethoxazole-trimethoprim (BACTRIM DS) 800-160 MG tablet; Take 1 Tablet by mouth 2 times a day.  Dispense: 6 Tablet; Refill: 0  - POCT Urinalysis  - URINE CULTURE(NEW); Future    "

## 2023-07-28 LAB
BACTERIA UR CULT: NORMAL
SIGNIFICANT IND 70042: NORMAL
SITE SITE: NORMAL
SOURCE SOURCE: NORMAL

## 2023-08-16 DIAGNOSIS — F41.9 ANXIETY: ICD-10-CM

## 2023-08-16 DIAGNOSIS — F43.9 STRESS: ICD-10-CM

## 2023-08-16 RX ORDER — CLONIDINE HYDROCHLORIDE 0.1 MG/1
TABLET ORAL
Qty: 180 TABLET | Refills: 0 | Status: SHIPPED | OUTPATIENT
Start: 2023-08-16 | End: 2023-09-19

## 2023-08-17 ENCOUNTER — OFFICE VISIT (OUTPATIENT)
Dept: MEDICAL GROUP | Facility: LAB | Age: 68
End: 2023-08-17
Payer: MEDICARE

## 2023-08-17 VITALS
HEIGHT: 66 IN | BODY MASS INDEX: 23.4 KG/M2 | OXYGEN SATURATION: 94 % | SYSTOLIC BLOOD PRESSURE: 120 MMHG | HEART RATE: 90 BPM | DIASTOLIC BLOOD PRESSURE: 70 MMHG | TEMPERATURE: 97.5 F

## 2023-08-17 DIAGNOSIS — M48.062 SPINAL STENOSIS OF LUMBAR REGION WITH NEUROGENIC CLAUDICATION: ICD-10-CM

## 2023-08-17 DIAGNOSIS — R15.9 INCONTINENCE OF FECES, UNSPECIFIED FECAL INCONTINENCE TYPE: ICD-10-CM

## 2023-08-17 DIAGNOSIS — R32 URINARY INCONTINENCE, UNSPECIFIED TYPE: ICD-10-CM

## 2023-08-17 DIAGNOSIS — M25.552 CHRONIC LEFT HIP PAIN: ICD-10-CM

## 2023-08-17 DIAGNOSIS — E66.3 OVERWEIGHT: ICD-10-CM

## 2023-08-17 DIAGNOSIS — E55.9 VITAMIN D DEFICIENCY: ICD-10-CM

## 2023-08-17 DIAGNOSIS — G89.29 CHRONIC LEFT HIP PAIN: ICD-10-CM

## 2023-08-17 DIAGNOSIS — R73.01 IMPAIRED FASTING GLUCOSE: ICD-10-CM

## 2023-08-17 DIAGNOSIS — F11.20 OPIOID TYPE DEPENDENCE, CONTINUOUS (HCC): ICD-10-CM

## 2023-08-17 DIAGNOSIS — J44.9 CHRONIC OBSTRUCTIVE PULMONARY DISEASE, UNSPECIFIED COPD TYPE (HCC): ICD-10-CM

## 2023-08-17 PROCEDURE — 3074F SYST BP LT 130 MM HG: CPT | Performed by: NURSE PRACTITIONER

## 2023-08-17 PROCEDURE — 99214 OFFICE O/P EST MOD 30 MIN: CPT | Performed by: NURSE PRACTITIONER

## 2023-08-17 PROCEDURE — 3078F DIAST BP <80 MM HG: CPT | Performed by: NURSE PRACTITIONER

## 2023-08-17 RX ORDER — HYDROCODONE BITARTRATE AND ACETAMINOPHEN 7.5; 325 MG/1; MG/1
1 TABLET ORAL 2 TIMES DAILY PRN
Qty: 60 TABLET | Refills: 0 | Status: SHIPPED | OUTPATIENT
Start: 2023-09-18 | End: 2023-11-15 | Stop reason: SDUPTHER

## 2023-08-17 RX ORDER — FLUTICASONE PROPIONATE AND SALMETEROL 250; 50 UG/1; UG/1
1 POWDER RESPIRATORY (INHALATION) EVERY 12 HOURS
Qty: 3 EACH | Refills: 3 | Status: SHIPPED | OUTPATIENT
Start: 2023-08-17 | End: 2023-08-31 | Stop reason: SDUPTHER

## 2023-08-17 RX ORDER — HYDROCODONE BITARTRATE AND ACETAMINOPHEN 7.5; 325 MG/1; MG/1
1 TABLET ORAL 2 TIMES DAILY PRN
Qty: 60 TABLET | Refills: 0 | Status: SHIPPED | OUTPATIENT
Start: 2023-10-18 | End: 2023-11-17

## 2023-08-17 RX ORDER — PHENTERMINE HYDROCHLORIDE 37.5 MG/1
TABLET ORAL
Qty: 30 TABLET | Refills: 0 | Status: SHIPPED | OUTPATIENT
Start: 2023-08-17 | End: 2023-10-09

## 2023-08-17 RX ORDER — HYDROCODONE BITARTRATE AND ACETAMINOPHEN 7.5; 325 MG/1; MG/1
1 TABLET ORAL 2 TIMES DAILY PRN
Qty: 60 TABLET | Refills: 0 | Status: SHIPPED | OUTPATIENT
Start: 2023-11-17 | End: 2023-11-15 | Stop reason: SDUPTHER

## 2023-08-17 ASSESSMENT — PATIENT HEALTH QUESTIONNAIRE - PHQ9
3. TROUBLE FALLING OR STAYING ASLEEP OR SLEEPING TOO MUCH: NOT AT ALL
4. FEELING TIRED OR HAVING LITTLE ENERGY: NOT AT ALL
9. THOUGHTS THAT YOU WOULD BE BETTER OFF DEAD, OR OF HURTING YOURSELF: NOT AT ALL
SUM OF ALL RESPONSES TO PHQ QUESTIONS 1-9: 0
7. TROUBLE CONCENTRATING ON THINGS, SUCH AS READING THE NEWSPAPER OR WATCHING TELEVISION: NOT AT ALL
5. POOR APPETITE OR OVEREATING: NOT AT ALL
2. FEELING DOWN, DEPRESSED, IRRITABLE, OR HOPELESS: NOT AT ALL
8. MOVING OR SPEAKING SO SLOWLY THAT OTHER PEOPLE COULD HAVE NOTICED. OR THE OPPOSITE, BEING SO FIGETY OR RESTLESS THAT YOU HAVE BEEN MOVING AROUND A LOT MORE THAN USUAL: NOT AT ALL
6. FEELING BAD ABOUT YOURSELF - OR THAT YOU ARE A FAILURE OR HAVE LET YOURSELF OR YOUR FAMILY DOWN: NOT AL ALL
1. LITTLE INTEREST OR PLEASURE IN DOING THINGS: NOT AT ALL
SUM OF ALL RESPONSES TO PHQ9 QUESTIONS 1 AND 2: 0

## 2023-08-17 NOTE — PROGRESS NOTES
"Chief Complaint   Patient presents with    Chronic Opiate Therapy       HPI:  Penelope is a 69 yo est female here to f/u on chronic issues:   #1- copd:  off oxygen.  Home spo2 great lately - mid 90's.  Using advair bid and proair as needed.  Walking more / doing deep breathing.  Stopped smoking about a year ago.    #2- chronic pain:  chronic pain in left hip, low back.  Does not want to go to PT or a spine specialist.  Occasional pain that radiates down legs.  Takes 2 hydrocodone.    #3- has both urinary and bowel leakage with urge.  Leaks liquid from rectum and occasional hard ball type stool.      Exam:   /70 (BP Location: Left arm, Patient Position: Sitting, BP Cuff Size: Large adult)   Pulse 90   Temp 36.4 °C (97.5 °F)   Ht 1.676 m (5' 6\")   SpO2 94%   Gen: NAD  Resp: nonlabored.  Able to speak in full sentences  Psy: pleasant / cooperative.   Neuro:  Alert and oriented x 3      1. Impaired fasting glucose  -recommend updated labs.    - HEMOGLOBIN A1C; Future  - TSH WITH REFLEX TO FT4; Future  - CBC WITH DIFFERENTIAL; Future  - Comp Metabolic Panel; Future    2. Vitamin D deficiency  - VITAMIN D,25 HYDROXY (DEFICIENCY); Future    3. Overweight  -encouraged limitation of phentermine due to her age but she states that she has done fine on this for years.  Encouraged her to   - phentermine (ADIPEX-P) 37.5 MG tablet; TAKE ONE TABLET BY MOUTH EVERY MORNING BEFORE BREAKFAST for 30 days  Dispense: 30 Tablet; Refill: 0    4. Spinal stenosis of lumbar region with neurogenic claudication  -Persistent.  Has no interest in seeing a surgeon or physical therapist.  She will be going with her  to meet Dr. Silva for his spinal issues and I encouraged her to consider seeing Dr. Silva for her spine.    5. Chronic left hip pain  -As in #4, no interest in seeing a specialist.    6. Chronic obstructive pulmonary disease, unspecified COPD type (HCC)  -stable.  Continues on Advair.  Rare use of albuterol.  Wants to " get off oxygen eventually but not yet.  Has stopped smoking.  Discussed the importance of deep breathing exercises.    7. Urinary incontinence, unspecified type  -At this point has no interested in seeing a specialist.  Pelvic floor strengthening discussed in depth..  Consider urogyn consult.      8. Incontinence of feces, unspecified fecal incontinence type  - at this point has no interest in seeing gastroenterology for this.  We discussed bulking up her stool by using 1 scoop metamucil everyday to prevent incontinence.      9.  Chronic opiate therapy:  Overall impression that this patient is benefiting from opioid therapy and that the benefits outweigh the risks of continued use. yes   - Controlled Substance Use Agreement current  - Urine drug screen current and reviewed / compliant with rx medications  - Additional lab: CMP to assess liver and renal function - UTD   - Rx refill prescriptions are done for 3 months, No early refills.   - Referral to pain management no    In prescribing controlled substances to this patient, I certify that I have obtained and reviewed the medical history of Penelope Bautista. I have also made a good abelardo effort to obtain applicable records from other providers who have treated the patient and records did not demonstrate any increased risk of substance abuse that would prevent me from prescribing controlled substances.     I have conducted a physical exam and documented it. I have reviewed Ms. Bautista’s prescription history as maintained by the [place] Prescription Monitoring Program.     I have assessed the patient’s risk for abuse, dependency, and addiction using the validated Opioid Risk Tool available at https://www.mdcalc.com/jqjfdj-jqas-nlyw-ort-narcotic-abuse.     Given the above, I believe the benefits of controlled substance therapy outweigh the risks. The reasons for prescribing controlled substances include non-narcotic, oral analgesic alternatives have been inadequate  for pain control. Accordingly, I have discussed the risk and benefits, treatment plan, and alternative therapies with the patient.

## 2023-08-18 ENCOUNTER — TELEPHONE (OUTPATIENT)
Dept: MEDICAL GROUP | Facility: LAB | Age: 68
End: 2023-08-18
Payer: MEDICARE

## 2023-08-22 NOTE — TELEPHONE ENCOUNTER
DOCUMENTATION OF PAR STATUS:    1. Name of Medication & Dose:  Phentermine HCl 37.5MG tablets     2. Name of Prescription Coverage Company & phone #: cover my meds    3. Date Prior Auth Submitted: 8/22/23    4. What informatieon was given to obtain insurance decision? Faxed ov notes    5. Prior Auth Status? deniedPending    6. Patient Notified: N\A

## 2023-08-31 DIAGNOSIS — J44.9 CHRONIC OBSTRUCTIVE PULMONARY DISEASE, UNSPECIFIED COPD TYPE (HCC): ICD-10-CM

## 2023-08-31 RX ORDER — FLUTICASONE PROPIONATE AND SALMETEROL 250; 50 UG/1; UG/1
1 POWDER RESPIRATORY (INHALATION) EVERY 12 HOURS
Qty: 3 EACH | Refills: 3 | Status: SHIPPED | OUTPATIENT
Start: 2023-08-31 | End: 2023-11-15

## 2023-09-17 DIAGNOSIS — F43.9 STRESS: ICD-10-CM

## 2023-09-17 DIAGNOSIS — F41.9 ANXIETY: ICD-10-CM

## 2023-09-19 RX ORDER — CLONIDINE HYDROCHLORIDE 0.1 MG/1
TABLET ORAL
Qty: 180 TABLET | Refills: 0 | Status: SHIPPED | OUTPATIENT
Start: 2023-09-19 | End: 2024-03-14

## 2023-09-19 NOTE — TELEPHONE ENCOUNTER
Received request via: Pharmacy    Was the patient seen in the last year in this department? Yes  8/17/23  Does the patient have an active prescription (recently filled or refills available) for medication(s) requested? No    Does the patient have intermediate Plus and need 100 day supply (blood pressure, diabetes and cholesterol meds only)? Medication is not for cholesterol, blood pressure or diabetes

## 2023-09-20 DIAGNOSIS — G25.81 RESTLESS LEG SYNDROME: ICD-10-CM

## 2023-09-20 RX ORDER — GABAPENTIN 300 MG/1
CAPSULE ORAL
Qty: 270 CAPSULE | Refills: 3 | Status: SHIPPED | OUTPATIENT
Start: 2023-09-20

## 2023-09-20 NOTE — TELEPHONE ENCOUNTER
Received request via: Pharmacy    Was the patient seen in the last year in this department? Yes  8/17/23  Does the patient have an active prescription (recently filled or refills available) for medication(s) requested? No    Does the patient have assisted Plus and need 100 day supply (blood pressure, diabetes and cholesterol meds only)? Medication is not for cholesterol, blood pressure or diabetes

## 2023-09-27 ENCOUNTER — APPOINTMENT (OUTPATIENT)
Dept: MEDICAL GROUP | Facility: LAB | Age: 68
End: 2023-09-27
Payer: MEDICARE

## 2023-10-09 DIAGNOSIS — E66.3 OVERWEIGHT: ICD-10-CM

## 2023-10-09 RX ORDER — PHENTERMINE HYDROCHLORIDE 37.5 MG/1
TABLET ORAL
Qty: 30 TABLET | Refills: 0 | Status: SHIPPED | OUTPATIENT
Start: 2023-10-09 | End: 2023-11-15 | Stop reason: SDUPTHER

## 2023-10-09 NOTE — TELEPHONE ENCOUNTER
Received request via: Pharmacy    Was the patient seen in the last year in this department? Yes  8/17/23  Does the patient have an active prescription (recently filled or refills available) for medication(s) requested? No    Does the patient have correction Plus and need 100 day supply (blood pressure, diabetes and cholesterol meds only)? Medication is not for cholesterol, blood pressure or diabetes

## 2023-10-26 RX ORDER — FLUTICASONE PROPIONATE AND SALMETEROL 50; 250 UG/1; UG/1
1 POWDER RESPIRATORY (INHALATION) EVERY 12 HOURS
Qty: 3 EACH | Refills: 3 | Status: SHIPPED | OUTPATIENT
Start: 2023-10-26 | End: 2023-12-28 | Stop reason: SDUPTHER

## 2023-10-28 DIAGNOSIS — F41.9 ANXIETY: ICD-10-CM

## 2023-10-28 DIAGNOSIS — F43.9 STRESS: ICD-10-CM

## 2023-10-30 RX ORDER — FLUOXETINE HYDROCHLORIDE 40 MG/1
40 CAPSULE ORAL EVERY MORNING
Qty: 90 CAPSULE | Refills: 3 | Status: SHIPPED | OUTPATIENT
Start: 2023-10-30

## 2023-11-15 ENCOUNTER — OFFICE VISIT (OUTPATIENT)
Dept: MEDICAL GROUP | Facility: LAB | Age: 68
End: 2023-11-15
Payer: MEDICARE

## 2023-11-15 VITALS
HEART RATE: 92 BPM | TEMPERATURE: 97.2 F | OXYGEN SATURATION: 94 % | SYSTOLIC BLOOD PRESSURE: 138 MMHG | DIASTOLIC BLOOD PRESSURE: 78 MMHG | HEIGHT: 66 IN | BODY MASS INDEX: 23.4 KG/M2 | RESPIRATION RATE: 16 BRPM

## 2023-11-15 DIAGNOSIS — G89.29 CHRONIC LEFT HIP PAIN: ICD-10-CM

## 2023-11-15 DIAGNOSIS — M25.552 CHRONIC LEFT HIP PAIN: ICD-10-CM

## 2023-11-15 DIAGNOSIS — M48.062 SPINAL STENOSIS OF LUMBAR REGION WITH NEUROGENIC CLAUDICATION: ICD-10-CM

## 2023-11-15 DIAGNOSIS — Z79.891 CHRONIC USE OF OPIATE FOR THERAPEUTIC PURPOSE: ICD-10-CM

## 2023-11-15 DIAGNOSIS — J43.2 CENTRILOBULAR EMPHYSEMA (HCC): ICD-10-CM

## 2023-11-15 DIAGNOSIS — Z23 NEED FOR INFLUENZA VACCINATION: ICD-10-CM

## 2023-11-15 DIAGNOSIS — E66.3 OVERWEIGHT: ICD-10-CM

## 2023-11-15 PROCEDURE — 99214 OFFICE O/P EST MOD 30 MIN: CPT | Mod: 25 | Performed by: NURSE PRACTITIONER

## 2023-11-15 PROCEDURE — 3078F DIAST BP <80 MM HG: CPT | Performed by: NURSE PRACTITIONER

## 2023-11-15 PROCEDURE — 90662 IIV NO PRSV INCREASED AG IM: CPT | Performed by: NURSE PRACTITIONER

## 2023-11-15 PROCEDURE — G0008 ADMIN INFLUENZA VIRUS VAC: HCPCS | Performed by: NURSE PRACTITIONER

## 2023-11-15 PROCEDURE — 3075F SYST BP GE 130 - 139MM HG: CPT | Performed by: NURSE PRACTITIONER

## 2023-11-15 RX ORDER — HYDROCODONE BITARTRATE AND ACETAMINOPHEN 7.5; 325 MG/1; MG/1
1 TABLET ORAL 2 TIMES DAILY PRN
Qty: 60 TABLET | Refills: 0 | Status: SHIPPED | OUTPATIENT
Start: 2023-12-17 | End: 2024-01-16

## 2023-11-15 RX ORDER — HYDROCODONE BITARTRATE AND ACETAMINOPHEN 7.5; 325 MG/1; MG/1
1 TABLET ORAL 2 TIMES DAILY PRN
Qty: 60 TABLET | Refills: 0 | Status: SHIPPED | OUTPATIENT
Start: 2024-01-16 | End: 2024-02-07 | Stop reason: SDUPTHER

## 2023-11-15 RX ORDER — HYDROCODONE BITARTRATE AND ACETAMINOPHEN 7.5; 325 MG/1; MG/1
1 TABLET ORAL 2 TIMES DAILY PRN
Qty: 60 TABLET | Refills: 0 | Status: SHIPPED | OUTPATIENT
Start: 2023-11-17 | End: 2024-02-07 | Stop reason: SDUPTHER

## 2023-11-15 RX ORDER — PHENTERMINE HYDROCHLORIDE 37.5 MG/1
TABLET ORAL
Qty: 30 TABLET | Refills: 2 | Status: SHIPPED | OUTPATIENT
Start: 2023-11-15 | End: 2024-02-26 | Stop reason: SDUPTHER

## 2023-11-15 NOTE — ASSESSMENT & PLAN NOTE
Chronic issue.  Feels her breathing is pretty well controlled with Advair twice daily.  Rare use for albuterol.  Occasional tickle in her throat with a cough that occurs strongly at least once a week.  Denies recent illness.  Needs her flu shot today.  No longer a smoker.

## 2023-11-15 NOTE — PROGRESS NOTES
Chief Complaint   Patient presents with    Chronic Opiate Therapy       HPI:   Penelope is a 68-year-old established female here to follow-up on chronic pain.  Suffers from chronic pain in her left hip and low back - left hip pain improving.  Takes 2 hydrocodone per day for pain control.  Prefers to refrain from physical therapy and physiatry.  Has no desire for any type of surgeries.  She admits that she does frequently have to take ibuprofen on top of her pain pills because of her pain level.  She rarely is physically active.  PEG score:  What number best describes your pain on average in the past week: 1-10:  3  What number in the past week past describes how much pain has interfered with enjoyment of life?  1 - 10: 1  What number on average describes how much pain has interfered with general activity? 1-10: 7-8 b/c of pain with walking / vacuuming.       Interval history: q 3 mo  Any major change in health since last appointment? No    Consequences of Chronic Opiate therapy:  (5 A's)  Analgesia: Compared to no treatment or prior treatment, pain is currently not changed  Activity: not changed  Adverse Events: denies constipation, dry mouth, itchy skin, nausea, and sedation  Aberrant Behaviors: She reports she is taking medication as prescribed and is not veering from agreed treatment regimen or provider recommendations. There have been no inappropriate refills or lost/stolen meds reported.  Affect/Mood: Pain is not impacting patient's mood.  Patient denies depression/anxiety.    Nonnarcotic treatments that are being used: Gabapentin    Last urine drug screen and controlled substance agreement were in 2023 and within normal limits\  Last imagin    Opioid Risk Score: 1    Interpretation of Opioid Risk Score   Score 0-3 = Low risk of abuse. Do UDS at least once per year.  Score 4-7 = Moderate risk of abuse. Do UDS 1-4 times per year.  Score 8+ = High risk of abuse. Refer to specialist.    Last order of  "CONTROLLED SUBSTANCE TREATMENT AGREEMENT was found on 2017 from Office Visit on 2017     UDS Summary                  URINE DRUG SCREEN Next Due 2019      Done 2018 [facility] PAIN MANAGEMENT SCREEN     Patient has more history with this topic...          Most recent UDS reviewed today and is consistent with prescribed medications.     I have reviewed the medical records, the Prescription Monitoring Program and I have determined that controlled substance treatment is medically indicated.     Centrilobular emphysema (HCC)  Chronic issue.  Feels her breathing is pretty well controlled with Advair twice daily.  Rare use for albuterol.  Occasional tickle in her throat with a cough that occurs strongly at least once a week.  Denies recent illness.  Needs her flu shot today.  No longer a smoker.     Past medical, surgical, family, and social history is reviewed and updated in Epic chart by me today.   Medications and allergies reviewed and updated in Epic chart by me today.     ROS:   As documented in history of present illness above    Exam:  /78 (BP Location: Left arm, Patient Position: Sitting, BP Cuff Size: Large adult)   Pulse 92   Temp 36.2 °C (97.2 °F)   Resp 16   Ht 1.676 m (5' 6\")   SpO2 94%   Gen: NAD  Resp: nonlabored.  Able to speak in full sentences  Psy: pleasant / cooperative.   Neuro:  Alert and oriented x 3      Assessment/ Plan / Medical Decision makin. Chronic pain from:    Chronic low back pain.      2. Chronic use of opiate drugs therapeutic purposes  -Overall impression that this patient is benefiting from opioid therapy and that the benefits outweigh the risks of continued use. yes   - Controlled Substance Use Agreement current  - Urine drug screen current and reviewed / compliant with rx medications  - Additional lab: CMP to assess liver and renal function - UTD   - Rx refill prescriptions are done for 3 months, No early refills.   - Referral to pain " management no  In prescribing controlled substances to this patient, I certify that I have obtained and reviewed the medical history.. I have also made a good abelardo effort to obtain applicable records from other providers who have treated the patient and records did not demonstrate any increased risk of substance abuse that would prevent me from prescribing controlled substances.     I have conducted a physical exam and documented it. I have reviewed this patient's prescription history as maintained by the [place] Prescription Monitoring Program.     I have assessed the patient’s risk for abuse, dependency, and addiction using the validated Opioid Risk Tool available at https://www.mdcalc.com/dltuqe-xgxm-ohti-ort-narcotic-abuse.     Given the above, I believe the benefits of controlled substance therapy outweigh the risks. The reasons for prescribing controlled substances include non-narcotic, oral analgesic alternatives have been inadequate for pain control. Accordingly, I have discussed the risk and benefits, treatment plan, and alternative therapies with the patient.     3.  Emphysema: Stable.  Continue Advair twice daily and albuterol as needed.  Influenza vaccine updated.  Declines pulmonary function testing.  Encouraged her to restart an exercise program to get back in shape/ build muscle mass, improve her lung health, lower her risk of cardiovascular events, prevent falls and broken bones.

## 2023-11-20 ENCOUNTER — TELEPHONE (OUTPATIENT)
Dept: MEDICAL GROUP | Facility: LAB | Age: 68
End: 2023-11-20
Payer: MEDICARE

## 2023-11-20 NOTE — TELEPHONE ENCOUNTER
DOCUMENTATION OF PAR STATUS:    1. Name of Medication & Dose: name brand advair     2. Name of Prescription Coverage Company & phone #: cover my meds  [id number] -   3. Date Prior Auth Submitted: 11/20/23    4. What information was given to obtain insurance decision? Ov notes faxed    5. Prior Auth Status? DENIEDPending    6. Patient Notified: yes

## 2023-12-28 ENCOUNTER — PATIENT MESSAGE (OUTPATIENT)
Dept: MEDICAL GROUP | Facility: LAB | Age: 68
End: 2023-12-28
Payer: MEDICARE

## 2023-12-28 RX ORDER — FLUTICASONE PROPIONATE AND SALMETEROL 50; 250 UG/1; UG/1
1 POWDER RESPIRATORY (INHALATION) EVERY 12 HOURS
Qty: 180 EACH | Refills: 3 | Status: SHIPPED | OUTPATIENT
Start: 2023-12-28 | End: 2024-01-03 | Stop reason: SDUPTHER

## 2024-01-03 ENCOUNTER — PATIENT MESSAGE (OUTPATIENT)
Dept: MEDICAL GROUP | Facility: LAB | Age: 69
End: 2024-01-03
Payer: MEDICARE

## 2024-01-03 RX ORDER — FLUTICASONE PROPIONATE AND SALMETEROL 250; 50 UG/1; UG/1
1 POWDER RESPIRATORY (INHALATION) EVERY 12 HOURS
Qty: 180 EACH | Refills: 3 | Status: SHIPPED | OUTPATIENT
Start: 2024-01-03 | End: 2024-01-12 | Stop reason: SDUPTHER

## 2024-01-12 NOTE — TELEPHONE ENCOUNTER
Patient would like a 30 day supply to [facility] since she is so low. Then a 90 day to [facility].

## 2024-01-14 RX ORDER — FLUTICASONE PROPIONATE AND SALMETEROL 250; 50 UG/1; UG/1
1 POWDER RESPIRATORY (INHALATION) EVERY 12 HOURS
Qty: 1 EACH | Refills: 5 | Status: SHIPPED | OUTPATIENT
Start: 2024-01-14 | End: 2024-02-07

## 2024-01-15 ENCOUNTER — PATIENT MESSAGE (OUTPATIENT)
Dept: MEDICAL GROUP | Facility: LAB | Age: 69
End: 2024-01-15
Payer: MEDICARE

## 2024-01-15 RX ORDER — FLUTICASONE PROPIONATE AND SALMETEROL 250; 50 UG/1; UG/1
1 POWDER RESPIRATORY (INHALATION) EVERY 12 HOURS
Qty: 60 EACH | Refills: 0 | Status: SHIPPED | OUTPATIENT
Start: 2024-01-15

## 2024-01-29 ENCOUNTER — HOSPITAL ENCOUNTER (OUTPATIENT)
Dept: RADIOLOGY | Facility: MEDICAL CENTER | Age: 69
End: 2024-01-29
Attending: NURSE PRACTITIONER
Payer: MEDICARE

## 2024-01-29 DIAGNOSIS — M48.062 SPINAL STENOSIS OF LUMBAR REGION WITH NEUROGENIC CLAUDICATION: ICD-10-CM

## 2024-01-29 PROCEDURE — 72100 X-RAY EXAM L-S SPINE 2/3 VWS: CPT

## 2024-02-07 ENCOUNTER — OFFICE VISIT (OUTPATIENT)
Dept: MEDICAL GROUP | Facility: LAB | Age: 69
End: 2024-02-07
Payer: MEDICARE

## 2024-02-07 ENCOUNTER — TELEPHONE (OUTPATIENT)
Dept: MEDICAL GROUP | Facility: LAB | Age: 69
End: 2024-02-07

## 2024-02-07 VITALS
OXYGEN SATURATION: 92 % | SYSTOLIC BLOOD PRESSURE: 90 MMHG | HEART RATE: 98 BPM | TEMPERATURE: 97.5 F | RESPIRATION RATE: 16 BRPM | DIASTOLIC BLOOD PRESSURE: 56 MMHG | HEIGHT: 66 IN | BODY MASS INDEX: 28.77 KG/M2 | WEIGHT: 179 LBS

## 2024-02-07 DIAGNOSIS — M25.552 CHRONIC LEFT HIP PAIN: ICD-10-CM

## 2024-02-07 DIAGNOSIS — R73.01 IMPAIRED FASTING GLUCOSE: ICD-10-CM

## 2024-02-07 DIAGNOSIS — J96.11 CHRONIC RESPIRATORY FAILURE WITH HYPOXIA (HCC): ICD-10-CM

## 2024-02-07 DIAGNOSIS — Z13.6 ENCOUNTER FOR SCREENING FOR CARDIOVASCULAR DISORDERS: ICD-10-CM

## 2024-02-07 DIAGNOSIS — R63.5 WEIGHT GAIN: ICD-10-CM

## 2024-02-07 DIAGNOSIS — F11.20 OPIOID TYPE DEPENDENCE, CONTINUOUS (HCC): ICD-10-CM

## 2024-02-07 DIAGNOSIS — J43.2 CENTRILOBULAR EMPHYSEMA (HCC): ICD-10-CM

## 2024-02-07 DIAGNOSIS — F33.42 RECURRENT MAJOR DEPRESSIVE DISORDER, IN FULL REMISSION (HCC): ICD-10-CM

## 2024-02-07 DIAGNOSIS — G89.29 CHRONIC LEFT HIP PAIN: ICD-10-CM

## 2024-02-07 DIAGNOSIS — M48.062 SPINAL STENOSIS OF LUMBAR REGION WITH NEUROGENIC CLAUDICATION: ICD-10-CM

## 2024-02-07 DIAGNOSIS — E55.9 VITAMIN D DEFICIENCY: ICD-10-CM

## 2024-02-07 PROCEDURE — 99214 OFFICE O/P EST MOD 30 MIN: CPT | Performed by: NURSE PRACTITIONER

## 2024-02-07 PROCEDURE — 3078F DIAST BP <80 MM HG: CPT | Performed by: NURSE PRACTITIONER

## 2024-02-07 PROCEDURE — 3074F SYST BP LT 130 MM HG: CPT | Performed by: NURSE PRACTITIONER

## 2024-02-07 RX ORDER — HYDROCODONE BITARTRATE AND ACETAMINOPHEN 7.5; 325 MG/1; MG/1
1 TABLET ORAL 2 TIMES DAILY PRN
Qty: 60 TABLET | Refills: 0 | Status: SHIPPED | OUTPATIENT
Start: 2024-04-16 | End: 2024-05-16

## 2024-02-07 RX ORDER — HYDROCODONE BITARTRATE AND ACETAMINOPHEN 7.5; 325 MG/1; MG/1
1 TABLET ORAL 2 TIMES DAILY PRN
Qty: 60 TABLET | Refills: 0 | Status: SHIPPED | OUTPATIENT
Start: 2024-03-17 | End: 2024-04-16

## 2024-02-07 RX ORDER — HYDROCODONE BITARTRATE AND ACETAMINOPHEN 7.5; 325 MG/1; MG/1
1 TABLET ORAL 2 TIMES DAILY PRN
Qty: 60 TABLET | Refills: 0 | Status: SHIPPED | OUTPATIENT
Start: 2024-02-16 | End: 2024-03-17

## 2024-02-07 ASSESSMENT — PATIENT HEALTH QUESTIONNAIRE - PHQ9: CLINICAL INTERPRETATION OF PHQ2 SCORE: 0

## 2024-02-07 NOTE — PROGRESS NOTES
Chief Complaint   Patient presents with    Chronic Opiate Therapy       HPI:  Penelope is a 68-year-old established female here to follow-up on chronic pain.  Suffers from chronic pain in her left hip and low back - left hip pain improving.  Takes 2 hydrocodone per day for pain control.  Prefers to refrain from physical therapy and physiatry.  Has no desire for any type of surgeries.  She admits that she does frequently have to take ibuprofen on top of her pain pills because of her pain level.  She rarely is physically active.  PEG score:  What number best describes your pain on average in the past week: 1-10:  3  What number in the past week past describes how much pain has interfered with enjoyment of life?  1 - 10: 1  What number on average describes how much pain has interfered with general activity? 1-10: 7-8 b/c of pain with walking / vacuuming.    last refill 2024 for #60 pills 7.5/325.  phentermine 2024 #30      Interval history: q 3 mo  Any major change in health since last appointment? No     Consequences of Chronic Opiate therapy:  (5 A's)  Analgesia: Compared to no treatment or prior treatment, pain is currently not changed  Activity: not changed  Adverse Events: denies constipation, dry mouth, itchy skin, nausea, and sedation  Aberrant Behaviors: She reports she is taking medication as prescribed and is not veering from agreed treatment regimen or provider recommendations. There have been no inappropriate refills or lost/stolen meds reported.  Affect/Mood: Pain is not impacting patient's mood.  Patient denies depression/anxiety.     Nonnarcotic treatments that are being used: Gabapentin     Last urine drug screen and controlled substance agreement were in 2023 and within normal limits\  Last imagin     Opioid Risk Score: 1     Interpretation of Opioid Risk Score   Score 0-3 = Low risk of abuse. Do UDS at least once per year.  Score 4-7 = Moderate risk of abuse. Do UDS 1-4 times per  "year.  Score 8+ = High risk of abuse. Refer to specialist.     2-she is also worried about her weight.  She would like advice on how to lose weight.  She states that she is avoiding white carbohydrates/sugars as much as she can but has a sweet tooth periodically.  She does not exercise.  [facility] but unfortunately we do not have the result, she tells me that it was at 6.  She does have emphysema.  She has periodic trouble with her breathing but uses Wixela.  Her moods have been good.      Exam:   BP 90/56 (BP Location: Left arm, Patient Position: Sitting, BP Cuff Size: Large adult)   Pulse 98   Temp 36.4 °C (97.5 °F)   Resp 16   Ht 1.676 m (5' 6\")   SpO2 92%   BMI 23.40 kg/m²   Gen: NAD  Resp: nonlabored.  Able to speak in full sentences  Psy: pleasant / cooperative.   Neuro:  Alert and oriented x 3      Assessment / Plan:  1. Impaired fasting glucose  Lipid Profile    TSH    CANCELED: TSH      2. Encounter for screening for cardiovascular disorders  Lipid Profile    TSH      3. Weight gain  TSH    CANCELED: TSH      4. Spinal stenosis of lumbar region with neurogenic claudication  HYDROcodone-acetaminophen (NORCO) 7.5-325 MG tab    HYDROcodone-acetaminophen (NORCO) 7.5-325 MG tab    HYDROcodone-acetaminophen (NORCO) 7.5-325 MG tab      5. Chronic left hip pain        6. Chronic respiratory failure with hypoxia (HCC)        7. Centrilobular emphysema (HCC)        8. Opioid type dependence, continuous (HCC)        9. Recurrent major depressive disorder, in full remission (HCC)        Fortunately her pain is stable, no changes were made to her opiate therapy.  Her UDS and CSA are up-to-date.  Overall impression that this patient is benefiting from opioid therapy and that the benefits outweigh the risks of continued use. yes   - Controlled Substance Use Agreement current  - Urine drug screen current and reviewed / compliant with rx medications  - Additional lab: CMP to " assess liver and renal function - UTD   - Rx refill prescriptions are done for 3 months, No early refills.   - Referral to pain management no  In prescribing controlled substances to this patient, I certify that I have obtained and reviewed the medical history of Penelope Bautista. I have also made a good abelardo effort to obtain applicable records from other providers who have treated the patient and records did not demonstrate any increased risk of substance abuse that would prevent me from prescribing controlled substances.     I have conducted a physical exam and documented it. I have reviewed Ms. Bautista’s prescription history as maintained by the [place] Prescription Monitoring Program.     I have assessed the patient’s risk for abuse, dependency, and addiction using the validated Opioid Risk Tool available at https://www.mdcalc.com/qzzqlp-tvnn-zpxt-ort-narcotic-abuse.     Given the above, I believe the benefits of controlled substance therapy outweigh the risks. The reasons for prescribing controlled substances include non-narcotic, oral analgesic alternatives have been inadequate for pain control. Accordingly, I have discussed the risk and benefits, treatment plan, and alternative therapies with the patient.        We had a good discussion about increasing her exercise.  I encouraged her to exercise for 30 minutes at least 5 days a week.  Encouraged her to move around more in her daily life such as cleaning her house, parking far away from her destination, working in her yard and going for walks.  Encouraged her to continue with a high-protein, vegetable based diet and avoid white carbohydrates.  We discussed that increasing exercise frequency/activity level will also improve her respiratory and cardiovascular health.  Requested labs from [facility].    HCC Gap Form    Diagnosis to address: J43.2 - Centrilobular emphysema (HCC)  Assessment and plan: Chronic, stable. Continue with current defined treatment plan:  Newly on Wixela which she states she does not find as helpful as Advair but has not had any recent flareups. Follow-up at least annually.  Diagnosis: F11.20 - Opioid type dependence, continuous (HCC)  Assessment and plan: Chronic, stable. Continue with current defined treatment plan: Doing fine on 2 hydrocodone per day.. Follow-up at least annually.  Diagnosis: F33.42 - Recurrent major depressive disorder, in full remission (HCC)  Assessment and plan: Chronic, stable. Continue with current defined treatment plan: Tells me her moods are very stable.  Denies depression.. Follow-up at least annually.  Diagnosis: J96.11 - Chronic respiratory failure with hypoxia (HCC)  Assessment and plan: Chronic, stable. Continue with current defined treatment plan: Prefers to refrain from daytime oxygen.  Oxygen stable today in our office at 92%.  Encouraged her to increase her exercise to improve her lung capacity and cardiovascular fitness level.. Follow-up at least annually.  Last edited 02/07/24 11:01 PST by SHIREEN Lorenzo

## 2024-02-26 ENCOUNTER — PATIENT MESSAGE (OUTPATIENT)
Dept: MEDICAL GROUP | Facility: LAB | Age: 69
End: 2024-02-26
Payer: MEDICARE

## 2024-02-26 DIAGNOSIS — E66.3 OVERWEIGHT: ICD-10-CM

## 2024-02-29 RX ORDER — PHENTERMINE HYDROCHLORIDE 37.5 MG/1
TABLET ORAL
Qty: 30 TABLET | Refills: 2 | Status: SHIPPED | OUTPATIENT
Start: 2024-02-29 | End: 2024-03-27

## 2024-03-06 DIAGNOSIS — F51.01 PRIMARY INSOMNIA: ICD-10-CM

## 2024-03-06 RX ORDER — QUETIAPINE FUMARATE 50 MG/1
50 TABLET, FILM COATED ORAL EVERY EVENING
Qty: 90 TABLET | Refills: 3 | Status: SHIPPED | OUTPATIENT
Start: 2024-03-06

## 2024-03-06 NOTE — TELEPHONE ENCOUNTER
Received request via: Pharmacy    Was the patient seen in the last year in this department? Yes    Does the patient have an active prescription (recently filled or refills available) for medication(s) requested? No    Pharmacy Name: [facility] [address]    Does the patient have nursing home Plus and need 100 day supply (blood pressure, diabetes and cholesterol meds only)? Patient does not have SCP

## 2024-03-12 LAB
CHOLEST SERPL-MCNC: 188 MG/DL (ref 100–199)
HDLC SERPL-MCNC: 116 MG/DL
LABORATORY COMMENT REPORT: NORMAL
LDLC SERPL CALC-MCNC: 59 MG/DL (ref 0–99)
TRIGL SERPL-MCNC: 72 MG/DL (ref 0–149)
TSH SERPL DL<=0.005 MIU/L-ACNC: 2.27 UIU/ML (ref 0.45–4.5)
VLDLC SERPL CALC-MCNC: 13 MG/DL (ref 5–40)

## 2024-03-14 DIAGNOSIS — F43.9 STRESS: ICD-10-CM

## 2024-03-14 DIAGNOSIS — F41.9 ANXIETY: ICD-10-CM

## 2024-03-14 RX ORDER — CLONIDINE HYDROCHLORIDE 0.1 MG/1
TABLET ORAL
Qty: 180 TABLET | Refills: 0 | Status: SHIPPED | OUTPATIENT
Start: 2024-03-14

## 2024-04-22 RX ORDER — FLUTICASONE PROPIONATE AND SALMETEROL 250; 50 UG/1; UG/1
1 POWDER RESPIRATORY (INHALATION) EVERY 12 HOURS
Qty: 60 EACH | Refills: 0 | Status: SHIPPED | OUTPATIENT
Start: 2024-04-22

## 2024-04-22 NOTE — TELEPHONE ENCOUNTER
Received request via: Pharmacy    Was the patient seen in the last year in this department? Yes    Does the patient have an active prescription (recently filled or refills available) for medication(s) requested? No    Pharmacy Name: [facility]    Does the patient have correction Plus and need 100 day supply (blood pressure, diabetes and cholesterol meds only)? Patient does not have SCP

## 2024-04-24 DIAGNOSIS — J45.40 MODERATE PERSISTENT ASTHMA WITHOUT COMPLICATION: ICD-10-CM

## 2024-04-24 RX ORDER — ALBUTEROL SULFATE 90 UG/1
AEROSOL, METERED RESPIRATORY (INHALATION)
Qty: 18 G | Refills: 20 | Status: SHIPPED | OUTPATIENT
Start: 2024-04-24

## 2024-04-24 NOTE — TELEPHONE ENCOUNTER
Received request via: Pharmacy    Was the patient seen in the last year in this department? Yes    Does the patient have an active prescription (recently filled or refills available) for medication(s) requested? No    [facility]    Does the patient have correction Plus and need 100 day supply (blood pressure, diabetes and cholesterol meds only)? Patient does not have SCP

## 2024-05-09 ENCOUNTER — APPOINTMENT (OUTPATIENT)
Dept: MEDICAL GROUP | Facility: LAB | Age: 69
End: 2024-05-09
Payer: MEDICARE

## 2024-05-09 VITALS
BODY MASS INDEX: 29.09 KG/M2 | RESPIRATION RATE: 16 BRPM | TEMPERATURE: 97.3 F | WEIGHT: 181 LBS | HEART RATE: 88 BPM | HEIGHT: 66 IN | OXYGEN SATURATION: 93 % | DIASTOLIC BLOOD PRESSURE: 76 MMHG | SYSTOLIC BLOOD PRESSURE: 118 MMHG

## 2024-05-09 DIAGNOSIS — E66.3 OVERWEIGHT (BMI 25.0-29.9): ICD-10-CM

## 2024-05-09 DIAGNOSIS — J96.11 CHRONIC RESPIRATORY FAILURE WITH HYPOXIA (HCC): ICD-10-CM

## 2024-05-09 DIAGNOSIS — Z79.891 CHRONIC USE OF OPIATE FOR THERAPEUTIC PURPOSE: ICD-10-CM

## 2024-05-09 DIAGNOSIS — J45.40 MODERATE PERSISTENT ASTHMA WITHOUT COMPLICATION: ICD-10-CM

## 2024-05-09 DIAGNOSIS — M48.062 SPINAL STENOSIS OF LUMBAR REGION WITH NEUROGENIC CLAUDICATION: ICD-10-CM

## 2024-05-09 DIAGNOSIS — J43.2 CENTRILOBULAR EMPHYSEMA (HCC): ICD-10-CM

## 2024-05-09 DIAGNOSIS — F51.01 PRIMARY INSOMNIA: ICD-10-CM

## 2024-05-09 DIAGNOSIS — F33.42 RECURRENT MAJOR DEPRESSIVE DISORDER, IN FULL REMISSION (HCC): ICD-10-CM

## 2024-05-09 PROCEDURE — 99214 OFFICE O/P EST MOD 30 MIN: CPT | Performed by: NURSE PRACTITIONER

## 2024-05-09 PROCEDURE — 3078F DIAST BP <80 MM HG: CPT | Performed by: NURSE PRACTITIONER

## 2024-05-09 PROCEDURE — 3074F SYST BP LT 130 MM HG: CPT | Performed by: NURSE PRACTITIONER

## 2024-05-09 RX ORDER — HYDROCODONE BITARTRATE AND ACETAMINOPHEN 7.5; 325 MG/1; MG/1
1 TABLET ORAL 2 TIMES DAILY PRN
Qty: 60 TABLET | Refills: 0 | Status: SHIPPED | OUTPATIENT
Start: 2024-06-16 | End: 2024-07-16

## 2024-05-09 RX ORDER — FLUTICASONE PROPIONATE AND SALMETEROL 250; 50 UG/1; UG/1
1 POWDER RESPIRATORY (INHALATION) EVERY 12 HOURS
Qty: 3 EACH | Refills: 3 | Status: SHIPPED | OUTPATIENT
Start: 2024-05-09

## 2024-05-09 RX ORDER — HYDROCODONE BITARTRATE AND ACETAMINOPHEN 7.5; 325 MG/1; MG/1
1 TABLET ORAL 2 TIMES DAILY PRN
Qty: 60 TABLET | Refills: 0 | Status: SHIPPED | OUTPATIENT
Start: 2024-05-17 | End: 2024-06-16

## 2024-05-09 RX ORDER — ALBUTEROL SULFATE 90 UG/1
AEROSOL, METERED RESPIRATORY (INHALATION)
Qty: 3 EACH | Refills: 3 | Status: SHIPPED | OUTPATIENT
Start: 2024-05-09

## 2024-05-09 RX ORDER — HYDROCODONE BITARTRATE AND ACETAMINOPHEN 7.5; 325 MG/1; MG/1
1 TABLET ORAL 2 TIMES DAILY PRN
Qty: 60 TABLET | Refills: 0 | Status: SHIPPED | OUTPATIENT
Start: 2024-07-16 | End: 2024-08-15

## 2024-05-09 NOTE — PROGRESS NOTES
Chief Complaint   Patient presents with    Chronic Opiate Therapy       HPI:   Penelope is a 67 yo est female here with her  to follow-up on chronic pain.  She is also very frustrated with her weight, despite eating healthy.  She is not exercising.  Moods are stable when she is sleeping well at night.  Denies problems with her bowels or bladder.  Chronic pain recheck for: She suffers from chronic pain in her spine, in particular thoracic and lumbar area.  Last dose of controlled substance: Today.  PEG score:  What number best describes your pain on average in the past week: 1-10:5  What number in the past week past describes how much pain has interfered with enjoyment of life?  1 - 10:5  What number on average describes how much pain has interfered with general activity? 1-10:5    Interval history: She is seen here every 3 months  Any major change in health since last appointment? No    Consequences of Chronic Opiate therapy:  (5 A's)  Analgesia: Compared to no treatment or prior treatment, pain is currently not changed  Activity: not changed  Adverse Events: denies constipation, dry mouth, itchy skin, nausea, and sedation  Aberrant Behaviors: She reports she is taking medication as prescribed and is not veering from agreed treatment regimen or provider recommendations. There have been no inappropriate refills or lost/stolen meds reported.  Affect/Mood: Pain is not impacting patient's mood.  Patient denies depression/anxiety.    Nonnarcotic treatments that are being used: Tylenol, SSRI/SNRI, topical agents, ice, and heat.     Last imagin2024    Opioid Risk Score: 1    Interpretation of Opioid Risk Score   Score 0-3 = Low risk of abuse. Do UDS at least once per year.  Score 4-7 = Moderate risk of abuse. Do UDS 1-4 times per year.  Score 8+ = High risk of abuse. Refer to specialist.      Most recent UDS reviewed today and is consistent with prescribed medications.     I have reviewed the medical records, the  "Prescription Monitoring Program and I have determined that controlled substance treatment is medically indicated.         Past medical, surgical, family, and social history is reviewed and updated in Epic chart by me today.   Medications and allergies reviewed and updated in Epic chart by me today.     ROS:   As documented in history of present illness above    Exam:  /76 (BP Location: Left arm, Patient Position: Sitting, BP Cuff Size: Large adult)   Pulse 88   Temp 36.3 °C (97.3 °F)   Resp 16   Ht 1.676 m (5' 6\")   Wt 82.1 kg (181 lb)   SpO2 93%     Constitutional: Alert, no distress, plus 3 vital signs  Skin:  Warm, dry, no rashes invisible areas  Respiratory: Unlabored respiration  Psych: Alert, pleasant, well-groomed, normal affect    Assessment/ Plan / Medical Decision makin. Chronic pain from:    Lumbar degenerative disc disease  2. Chronic use of opiate drugs therapeutic purposes  -Overall impression that this patient is benefiting from opioid therapy and that the benefits outweigh the risks of continued use. yes   - Controlled Substance Use Agreement current  - Urine drug screen current and reviewed / compliant with rx medications -this will be updated at her next visit  - Additional lab: CMP to assess liver and renal function - UTD   - Rx refill prescriptions are done for 3 months, No early refills.   - Referral to pain management no  In prescribing controlled substances to this patient, I certify that I have obtained and reviewed the medical history.. I have also made a good abelardo effort to obtain applicable records from other providers who have treated the patient and records did not demonstrate any increased risk of substance abuse that would prevent me from prescribing controlled substances.     I have conducted a physical exam and documented it. I have reviewed this patient's prescription history as maintained by the [place] Prescription Monitoring Program.     I have assessed the " patient’s risk for abuse, dependency, and addiction using the validated Opioid Risk Tool available at https://www.mdcalc.com/ibwsxb-omqg-ofbp-ort-narcotic-abuse.     Given the above, I believe the benefits of controlled substance therapy outweigh the risks. The reasons for prescribing controlled substances include non-narcotic, oral analgesic alternatives have been inadequate for pain control. Accordingly, I have discussed the risk and benefits, treatment plan, and alternative therapies with the patient.     3.  Overweight: We discussed various treatment methods.  She has tried phentermine in the past without significant success.  We discussed GLP-1 agonist therapy as she was very curious about that.  Discussed approval of Wegovy by the FDA for her BMI although difficulty with supply chain/availability with GLP-1 agonist therapy and lack of coverage by Medicare at this time.  I did encourage her to check with [facility] compounding pharmacy as they may be compounding semaglutide or tirzepatide.  Discussed benefit and side effects of GLP-1 agonist therapy.    4.  Insomnia: Stable.  Sleeping well.    5.  Anxiety with depression: Improved.  Doing well on Abilify.    Side effects of all medications prescribed today were discussed with the patient including how to take the medications and proper dosage. Discussed repercussions of not taking the medications as prescribed. Instructed to call the office should she have any negative side effects or problems with the medications.

## 2024-05-10 NOTE — TELEPHONE ENCOUNTER
Received request via: Pharmacy    Was the patient seen in the last year in this department? Yes    Does the patient have an active prescription (recently filled or refills available) for medication(s) requested? No    Pharmacy Name: [facility]    Does the patient have alf Plus and need 100 day supply (blood pressure, diabetes and cholesterol meds only)? Patient does not have SCP

## 2024-05-12 RX ORDER — OMEPRAZOLE 20 MG/1
20 CAPSULE, DELAYED RELEASE ORAL DAILY
Qty: 90 CAPSULE | Refills: 3 | Status: SHIPPED | OUTPATIENT
Start: 2024-05-12

## 2024-06-04 ENCOUNTER — PATIENT MESSAGE (OUTPATIENT)
Dept: MEDICAL GROUP | Facility: LAB | Age: 69
End: 2024-06-04
Payer: MEDICARE

## 2024-06-22 DIAGNOSIS — F43.9 STRESS: ICD-10-CM

## 2024-06-22 DIAGNOSIS — J96.11 CHRONIC RESPIRATORY FAILURE WITH HYPOXIA (HCC): ICD-10-CM

## 2024-06-22 DIAGNOSIS — J43.2 CENTRILOBULAR EMPHYSEMA (HCC): ICD-10-CM

## 2024-06-22 DIAGNOSIS — F41.9 ANXIETY: ICD-10-CM

## 2024-06-24 ENCOUNTER — APPOINTMENT (OUTPATIENT)
Dept: MEDICAL GROUP | Facility: LAB | Age: 69
End: 2024-06-24
Payer: MEDICARE

## 2024-06-24 RX ORDER — CLONIDINE HYDROCHLORIDE 0.1 MG/1
0.1 TABLET ORAL 2 TIMES DAILY
Qty: 180 TABLET | Refills: 0 | Status: SHIPPED | OUTPATIENT
Start: 2024-06-24 | End: 2024-09-22

## 2024-06-24 RX ORDER — FLUTICASONE PROPIONATE AND SALMETEROL 250; 50 UG/1; UG/1
1 POWDER RESPIRATORY (INHALATION) EVERY 12 HOURS
Qty: 60 EACH | Refills: 2 | Status: SHIPPED | OUTPATIENT
Start: 2024-06-24

## 2024-06-24 NOTE — TELEPHONE ENCOUNTER
Received request via: Pharmacy    Was the patient seen in the last year in this department? Yes    Does the patient have an active prescription (recently filled or refills available) for medication(s) requested? No    [facility]     Does the patient have [facility] Plus and need 100 day supply (blood pressure, diabetes and cholesterol meds only)? Patient does not have SCP

## 2024-06-27 ENCOUNTER — OFFICE VISIT (OUTPATIENT)
Dept: MEDICAL GROUP | Facility: LAB | Age: 69
End: 2024-06-27
Payer: MEDICARE

## 2024-06-27 ENCOUNTER — HOSPITAL ENCOUNTER (OUTPATIENT)
Dept: RADIOLOGY | Facility: MEDICAL CENTER | Age: 69
End: 2024-06-27
Attending: NURSE PRACTITIONER
Payer: MEDICARE

## 2024-06-27 VITALS
DIASTOLIC BLOOD PRESSURE: 50 MMHG | SYSTOLIC BLOOD PRESSURE: 88 MMHG | OXYGEN SATURATION: 91 % | WEIGHT: 173 LBS | TEMPERATURE: 96.9 F | BODY MASS INDEX: 27.8 KG/M2 | HEIGHT: 66 IN | HEART RATE: 94 BPM | RESPIRATION RATE: 20 BRPM

## 2024-06-27 DIAGNOSIS — J96.11 CHRONIC RESPIRATORY FAILURE WITH HYPOXIA (HCC): ICD-10-CM

## 2024-06-27 DIAGNOSIS — R09.02 HYPOXIA: ICD-10-CM

## 2024-06-27 DIAGNOSIS — J43.2 CENTRILOBULAR EMPHYSEMA (HCC): ICD-10-CM

## 2024-06-27 DIAGNOSIS — I95.9 HYPOTENSION, UNSPECIFIED HYPOTENSION TYPE: ICD-10-CM

## 2024-06-27 PROCEDURE — 3078F DIAST BP <80 MM HG: CPT | Performed by: NURSE PRACTITIONER

## 2024-06-27 PROCEDURE — 3074F SYST BP LT 130 MM HG: CPT | Performed by: NURSE PRACTITIONER

## 2024-06-27 PROCEDURE — 71046 X-RAY EXAM CHEST 2 VIEWS: CPT

## 2024-06-27 PROCEDURE — 99214 OFFICE O/P EST MOD 30 MIN: CPT | Performed by: NURSE PRACTITIONER

## 2024-06-27 RX ORDER — FLUTICASONE FUROATE, UMECLIDINIUM BROMIDE AND VILANTEROL TRIFENATATE 200; 62.5; 25 UG/1; UG/1; UG/1
1 POWDER RESPIRATORY (INHALATION) DAILY
Qty: 30 EACH | Refills: 5 | Status: SHIPPED | OUTPATIENT
Start: 2024-06-27

## 2024-06-27 NOTE — PROGRESS NOTES
Verbal consent was acquired by the patient to use Z2 ambient listening note generation during this visit Yes      Subjective   Penelope Bautista is a 68 y.o. female who presents for low oxygen levels  History of Present Illness  The patient is a 68-year-old established female here with complaint of breathing issues.    The patient's oxygen saturation levels have been consistently low at home, initially ranging between 78 and 79, but has since improved to between 85 and 91. The onset of these symptoms was approximately 1.5 weeks ago, with no accompanying symptoms such as cough, shortness of breath, or fever. The patient also reported feeling fatigued. She has been utilizing supplemental oxygen throughout the day and night for the past week, although she admits to inconsistent usage yesterday. The patient suspects that her blood pressure may be influenced by her recent dental visit where she was sedated for 4 hours, during which she was put on oxygen. She admits to not consuming water this morning and admits to poor hydration. She denies experiencing dizziness but reports oral pain, for which she was prescribed 10 to 12 hydrocodone due to dental procedure yesterday. She denies any recent illness or recent travel. Her appetite remains unaffected, and she has lost 8 pounds on semiglutide. She has been using a treadmill for exercise. She denies current illness, nausea, vomiting, or abdominal pain. She reported feeling unwell the day after starting an injection, but did not vomit. She uses a rescue inhaler as needed and Wixela inhalation in the morning and at night. She reduced her smoking from 2 cigarettes to 1.5 cigarettes per day, but ceased this habit when her oxygen levels decreased. She also uses a nebulizer and takes propranolol at night.   She is allergic to CECLOR.    Review of Systems  Negative except for HPI  Objective   BP (!) 88/50 (BP Location: Right arm, Patient Position: Sitting, BP Cuff Size:  "Large adult)   Pulse 94   Temp 36.1 °C (96.9 °F)   Resp 20   Ht 1.676 m (5' 6\")   Wt 78.5 kg (173 lb)   SpO2 91%   Physical Exam  Constitutional: Alert, no distress, plus 3 vital signs  Skin:  Warm, dry, no rashes invisible areas  Eye: Equal, round and reactive, conjunctiva clear  ENMT: Oropharynx without erythema or exudate.  Neck: No significantly enlarged lymphadenopathy.  Respiratory: She does have slowed expiration with occasional expiratory rhonchi.  No expiratory wheezing.  She is able to speak in a full sentence without appearing to be in distress.  Cardiovascular: Normal rate and rhythm.  Psych: Alert, pleasant, well-groomed, normal affect        Assessment & Plan  1.  COPD exacerbation:  Recommend updated pulmonary function test.  Change Wixela to Trelegy if covered by insurance.  Discussed Trelegy in depth.  May continue albuterol every 4-6 hours as needed for shortness of breath or wheezing.  May use albuterol via nebulizer at home as needed.  Recommend chest x-ray today if possible.  Discussed deep breathing exercises and increasing hydration.  She understands the importance of completely refraining from any type of cigarette smoking/inhaling anything.  We discussed continuation of exercise.  She is on amoxicillin 500 mg 3 times daily for a week through her dentist and if she has pneumonia on her chest x-ray I will add on azithromycin and recheck chest x-ray a few weeks later.  ER precautions prn fever >102.5, increased WOB, onset CP, or worsening overall symptoms.    2.  Overweight: Improving.  Doing well on semaglutide.  Encouraged continued exercise.    3.  Hypotension: Likely related to dehydration and potentially sedation with her dentist yesterday.  Discussed the importance of hydration today and she will work on getting about 40 to 60 ounces of water throughout the day today.  Encouraged her to skip her propranolol tonight if her blood pressure is consistently below 100/60.  Fortunately she " is asymptomatic, denying dizziness or feeling lightheaded.               Please note that this dictation was created using voice recognition software. I have made every reasonable attempt to correct obvious errors, but I expect that there are errors of grammar and possibly content that I did not discover before finalizing the note.

## 2024-07-09 DIAGNOSIS — F51.01 PRIMARY INSOMNIA: ICD-10-CM

## 2024-07-10 RX ORDER — QUETIAPINE FUMARATE 50 MG/1
50 TABLET, FILM COATED ORAL EVERY EVENING
Qty: 90 TABLET | Refills: 3 | Status: SHIPPED | OUTPATIENT
Start: 2024-07-10

## 2024-07-19 DIAGNOSIS — G25.81 RESTLESS LEG SYNDROME: ICD-10-CM

## 2024-07-19 DIAGNOSIS — F51.01 PRIMARY INSOMNIA: ICD-10-CM

## 2024-07-21 RX ORDER — PROPRANOLOL HYDROCHLORIDE 10 MG/1
TABLET ORAL
Qty: 180 TABLET | Refills: 3 | Status: SHIPPED | OUTPATIENT
Start: 2024-07-21

## 2024-07-21 RX ORDER — TRAZODONE HYDROCHLORIDE 50 MG/1
50 TABLET ORAL EVERY EVENING
Qty: 90 TABLET | Refills: 3 | Status: SHIPPED | OUTPATIENT
Start: 2024-07-21

## 2024-07-21 RX ORDER — GABAPENTIN 300 MG/1
CAPSULE ORAL
Qty: 270 CAPSULE | Refills: 3 | Status: SHIPPED | OUTPATIENT
Start: 2024-07-21

## 2024-07-22 RX ORDER — ARIPIPRAZOLE 5 MG/1
5 TABLET ORAL DAILY
Qty: 90 TABLET | Refills: 3 | Status: SHIPPED | OUTPATIENT
Start: 2024-07-22

## 2024-07-31 ENCOUNTER — OFFICE VISIT (OUTPATIENT)
Dept: MEDICAL GROUP | Facility: LAB | Age: 69
End: 2024-07-31
Payer: MEDICARE

## 2024-07-31 ENCOUNTER — HOSPITAL ENCOUNTER (OUTPATIENT)
Facility: MEDICAL CENTER | Age: 69
End: 2024-07-31
Attending: NURSE PRACTITIONER
Payer: MEDICARE

## 2024-07-31 VITALS
DIASTOLIC BLOOD PRESSURE: 64 MMHG | WEIGHT: 164.6 LBS | BODY MASS INDEX: 26.45 KG/M2 | HEIGHT: 66 IN | RESPIRATION RATE: 14 BRPM | SYSTOLIC BLOOD PRESSURE: 116 MMHG | HEART RATE: 88 BPM | OXYGEN SATURATION: 93 % | TEMPERATURE: 97.2 F

## 2024-07-31 DIAGNOSIS — J43.2 CENTRILOBULAR EMPHYSEMA (HCC): ICD-10-CM

## 2024-07-31 DIAGNOSIS — F11.20 OPIOID TYPE DEPENDENCE, CONTINUOUS (HCC): ICD-10-CM

## 2024-07-31 DIAGNOSIS — G89.29 OTHER CHRONIC PAIN: ICD-10-CM

## 2024-07-31 DIAGNOSIS — Z79.891 CHRONIC USE OF OPIATE FOR THERAPEUTIC PURPOSE: ICD-10-CM

## 2024-07-31 DIAGNOSIS — M48.062 SPINAL STENOSIS OF LUMBAR REGION WITH NEUROGENIC CLAUDICATION: ICD-10-CM

## 2024-07-31 DIAGNOSIS — Z98.890 S/P THORACOTOMY: ICD-10-CM

## 2024-07-31 DIAGNOSIS — E66.3 OVERWEIGHT (BMI 25.0-29.9): ICD-10-CM

## 2024-07-31 PROCEDURE — G0481 DRUG TEST DEF 8-14 CLASSES: HCPCS

## 2024-07-31 RX ORDER — HYDROCODONE BITARTRATE AND ACETAMINOPHEN 7.5; 325 MG/1; MG/1
1 TABLET ORAL 2 TIMES DAILY PRN
Qty: 60 TABLET | Refills: 0 | Status: SHIPPED | OUTPATIENT
Start: 2024-10-16 | End: 2024-11-15

## 2024-07-31 RX ORDER — HYDROCODONE BITARTRATE AND ACETAMINOPHEN 7.5; 325 MG/1; MG/1
1 TABLET ORAL 2 TIMES DAILY PRN
Qty: 60 TABLET | Refills: 0 | Status: SHIPPED | OUTPATIENT
Start: 2024-09-16 | End: 2024-10-16

## 2024-07-31 RX ORDER — FLUTICASONE FUROATE, UMECLIDINIUM BROMIDE AND VILANTEROL TRIFENATATE 200; 62.5; 25 UG/1; UG/1; UG/1
1 POWDER RESPIRATORY (INHALATION) DAILY
Qty: 3 EACH | Refills: 3 | Status: SHIPPED | OUTPATIENT
Start: 2024-07-31

## 2024-07-31 RX ORDER — HYDROCODONE BITARTRATE AND ACETAMINOPHEN 7.5; 325 MG/1; MG/1
1 TABLET ORAL 2 TIMES DAILY PRN
Qty: 96 TABLET | Refills: 0 | Status: SHIPPED | OUTPATIENT
Start: 2024-08-01 | End: 2024-09-16

## 2024-08-05 LAB
1OH-MIDAZOLAM UR QL SCN: NOT DETECTED
6MAM UR QL: NOT DETECTED
7AMINOCLONAZEPAM UR QL: NOT DETECTED
A-OH ALPRAZ UR QL: NOT DETECTED
ALPRAZ UR QL: NOT DETECTED
AMPHET UR QL SCN: PRESENT
ANNOTATION COMMENT IMP: NORMAL
BARBITURATES UR QL: NEGATIVE
BUPRENORPHINE UR QL: NOT DETECTED
BZE UR QL: NEGATIVE
CARBOXYTHC UR QL: NEGATIVE
CARISOPRODOL UR QL: NEGATIVE
CLONAZEPAM UR QL: NOT DETECTED
CODEINE UR QL: NOT DETECTED
CREAT UR-MCNC: 209.8 MG/DL (ref 20–400)
DIAZEPAM UR QL: NOT DETECTED
ETHYL GLUCURONIDE UR QL: NEGATIVE
FENTANYL UR QL: NOT DETECTED
GABAPENTIN UR QL CFM: PRESENT
HYDROCODONE UR QL: PRESENT
HYDROMORPHONE UR QL: PRESENT
LORAZEPAM UR QL: NOT DETECTED
MDA UR QL: NOT DETECTED
MDEA UR QL: NOT DETECTED
MDMA UR QL: NOT DETECTED
ME-PHENIDATE UR QL: NOT DETECTED
METHADONE UR QL: NEGATIVE
METHAMPHET UR QL: NOT DETECTED
MIDAZOLAM UR QL SCN: NOT DETECTED
MORPHINE UR QL: NOT DETECTED
NALOXONE UR QL CFM: NOT DETECTED
NORBUPRENORPHINE UR QL CFM: NOT DETECTED
NORDIAZEPAM UR QL: NOT DETECTED
NORFENTANYL UR QL: NOT DETECTED
NORHYDROCODONE UR QL CFM: PRESENT
NORMEPERIDINE UR QL CFM: NOT DETECTED
NOROXYCODONE UR QL CFM: NOT DETECTED
NOROXYMORPHONE UR QL SCN: NOT DETECTED
OXAZEPAM UR QL: NOT DETECTED
OXYCODONE UR QL: NOT DETECTED
OXYMORPHONE UR QL: NOT DETECTED
PATHOLOGY STUDY: NORMAL
PCP UR QL: NEGATIVE
PHENTERMINE UR QL: NOT DETECTED
PREGABALIN UR QL CFM: NOT DETECTED
SERVICE CMNT-IMP: NORMAL
TAPENTADOL UR QL SCN: NOT DETECTED
TAPENTADOL UR QL SCN: NOT DETECTED
TEMAZEPAM UR QL: NOT DETECTED
TRAMADOL UR QL: NEGATIVE
ZOLPIDEM PHENYL-4-CARB UR QL SCN: NOT DETECTED
ZOLPIDEM UR QL: NOT DETECTED

## 2024-09-18 ENCOUNTER — HOSPITAL ENCOUNTER (OUTPATIENT)
Dept: PULMONOLOGY | Facility: MEDICAL CENTER | Age: 69
End: 2024-09-18
Attending: NURSE PRACTITIONER
Payer: MEDICARE

## 2024-09-18 DIAGNOSIS — J96.11 CHRONIC RESPIRATORY FAILURE WITH HYPOXIA (HCC): ICD-10-CM

## 2024-09-18 DIAGNOSIS — J43.2 CENTRILOBULAR EMPHYSEMA (HCC): ICD-10-CM

## 2024-09-18 DIAGNOSIS — R09.02 HYPOXIA: ICD-10-CM

## 2024-10-14 ENCOUNTER — PATIENT MESSAGE (OUTPATIENT)
Dept: MEDICAL GROUP | Facility: LAB | Age: 69
End: 2024-10-14
Payer: MEDICARE

## 2024-10-14 DIAGNOSIS — M48.062 SPINAL STENOSIS OF LUMBAR REGION WITH NEUROGENIC CLAUDICATION: ICD-10-CM

## 2024-10-16 RX ORDER — HYDROCODONE BITARTRATE AND ACETAMINOPHEN 7.5; 325 MG/1; MG/1
1 TABLET ORAL 2 TIMES DAILY PRN
Qty: 60 TABLET | Refills: 0 | Status: SHIPPED | OUTPATIENT
Start: 2024-10-27 | End: 2024-11-30

## 2024-10-23 ENCOUNTER — PATIENT MESSAGE (OUTPATIENT)
Dept: MEDICAL GROUP | Facility: LAB | Age: 69
End: 2024-10-23
Payer: MEDICARE

## 2024-10-23 DIAGNOSIS — E66.3 OVERWEIGHT (BMI 25.0-29.9): ICD-10-CM

## 2024-10-29 ENCOUNTER — APPOINTMENT (OUTPATIENT)
Dept: PULMONOLOGY | Facility: MEDICAL CENTER | Age: 69
End: 2024-10-29
Attending: NURSE PRACTITIONER
Payer: MEDICARE

## 2024-11-11 ENCOUNTER — APPOINTMENT (OUTPATIENT)
Dept: MEDICAL GROUP | Facility: LAB | Age: 69
End: 2024-11-11
Payer: MEDICARE

## 2024-11-11 VITALS
RESPIRATION RATE: 16 BRPM | SYSTOLIC BLOOD PRESSURE: 100 MMHG | TEMPERATURE: 96.8 F | DIASTOLIC BLOOD PRESSURE: 60 MMHG | OXYGEN SATURATION: 93 % | HEART RATE: 84 BPM | WEIGHT: 148 LBS | HEIGHT: 66 IN | BODY MASS INDEX: 23.78 KG/M2

## 2024-11-11 DIAGNOSIS — M17.12 PRIMARY OSTEOARTHRITIS OF LEFT KNEE: ICD-10-CM

## 2024-11-11 DIAGNOSIS — F51.01 PRIMARY INSOMNIA: ICD-10-CM

## 2024-11-11 DIAGNOSIS — G89.4 CHRONIC PAIN SYNDROME: ICD-10-CM

## 2024-11-11 DIAGNOSIS — Z79.891 CHRONIC USE OF OPIATE FOR THERAPEUTIC PURPOSE: ICD-10-CM

## 2024-11-11 DIAGNOSIS — E66.3 OVERWEIGHT (BMI 25.0-29.9): ICD-10-CM

## 2024-11-11 DIAGNOSIS — M48.062 SPINAL STENOSIS OF LUMBAR REGION WITH NEUROGENIC CLAUDICATION: ICD-10-CM

## 2024-11-11 DIAGNOSIS — G89.29 OTHER CHRONIC PAIN: ICD-10-CM

## 2024-11-11 PROCEDURE — 3074F SYST BP LT 130 MM HG: CPT | Performed by: NURSE PRACTITIONER

## 2024-11-11 PROCEDURE — 99214 OFFICE O/P EST MOD 30 MIN: CPT | Performed by: NURSE PRACTITIONER

## 2024-11-11 PROCEDURE — 3078F DIAST BP <80 MM HG: CPT | Performed by: NURSE PRACTITIONER

## 2024-11-11 RX ORDER — HYDROCODONE BITARTRATE AND ACETAMINOPHEN 7.5; 325 MG/1; MG/1
1 TABLET ORAL 2 TIMES DAILY PRN
Qty: 60 TABLET | Refills: 0 | Status: SHIPPED | OUTPATIENT
Start: 2024-11-30 | End: 2024-12-30

## 2024-11-11 RX ORDER — HYDROCODONE BITARTRATE AND ACETAMINOPHEN 7.5; 325 MG/1; MG/1
1 TABLET ORAL 2 TIMES DAILY PRN
Qty: 60 TABLET | Refills: 0 | Status: SHIPPED | OUTPATIENT
Start: 2024-12-30 | End: 2025-01-29

## 2024-11-11 RX ORDER — HYDROCODONE BITARTRATE AND ACETAMINOPHEN 7.5; 325 MG/1; MG/1
1 TABLET ORAL 2 TIMES DAILY PRN
Qty: 96 TABLET | Refills: 0 | Status: SHIPPED | OUTPATIENT
Start: 2025-01-29 | End: 2025-02-28

## 2024-11-11 RX ORDER — TRAZODONE HYDROCHLORIDE 50 MG/1
50 TABLET, FILM COATED ORAL EVERY EVENING
Qty: 90 TABLET | Refills: 3 | Status: SHIPPED | OUTPATIENT
Start: 2024-11-11

## 2024-11-11 NOTE — PROGRESS NOTES
"Verbal consent was acquired by the patient to use [employer] ambient listening note generation during this visit Yes      Subjective   Penelope Bautista is a 69 y.o. female who presents for chronic pain f/u.   History of Present Illness  The patient is a 69-year-old established female here for chronic pain follow-up. She is accompanied by her . She has chronic pain r/t multiple areas of osteoarthritis.  Also from pain in her chest related to a history of surgery when she had a empyema.  Currently taking 2 hydrocodone per day.    Emphysema: Chronic issue.  She reports that her oxygen levels have been fluctuating between 88 and 92, but never dropping below 88.  Using Trelegy daily as her maintenance inhaler.  No recent flareups.    Overweight: Doing well on semaglutide.  She has experienced a weight loss of 30 pounds since June 2024, which she attributes to the use of semaglutide injections. She attempted to increase the dosage to 40, but this resulted in sickness the following day, so she has decided not to continue with that dosage.     Insomnia: Chronic issue.  Well-controlled with trazodone.  Requesting a refill of trazodone.      Review of Systems  Negative except for HPI  Objective   /60 (BP Location: Right arm, Patient Position: Sitting, BP Cuff Size: Adult)   Pulse 84   Temp 36 °C (96.8 °F)   Resp 16   Ht 1.676 m (5' 6\")   Wt 67.1 kg (148 lb)   SpO2 93%   Physical Exam  Gen. appears healthy in no distress   Skin appropriate for sex and age   Neck trachea is midline  Lungs unlabored breathing  Heart regular rate  Neuro gait and station normal   Psych appropriate, calm, interactive, well-groomed       Assessment & Plan  1. Chronic pain/ OA:  In prescribing controlled substances to this patient, I certify that I have obtained and reviewed the medical history of Penelope Bautista. I have also made a good abelardo effort to obtain applicable records from other providers who have treated the " patient and records did not demonstrate any increased risk of substance abuse that would prevent me from prescribing controlled substances.     I have conducted a physical exam and documented it. I have reviewed Ms. Bautista’s prescription history as maintained by the [place] Prescription Monitoring Program.     I have assessed the patient’s risk for abuse, dependency, and addiction using the validated Opioid Risk Tool available at https://www.[employer].com/gkokcf-vpuw-kalr-ort-narcotic-abuse.     Given the above, I believe the benefits of controlled substance therapy outweigh the risks. The reasons for prescribing controlled substances include non-narcotic, oral analgesic alternatives have been inadequate for pain control. Accordingly, I have discussed the risk and benefits, treatment plan, and alternative therapies with the patient.      2. Emphysema:   Stable with use of daily Trelegy and prn albuterol.  Declined influenza vaccine.      3.  Chronic use opiates in therapeutic use:  In prescribing controlled substances to this patient, I certify that I have obtained and reviewed the medical history of Penelope Bautista. I have also made a good abelardo effort to obtain applicable records from other providers who have treated the patient and records did not demonstrate any increased risk of substance abuse that would prevent me from prescribing controlled substances.     I have conducted a physical exam and documented it. I have reviewed Ms. Bautista’s prescription history as maintained by the [place] Prescription Monitoring Program.     I have assessed the patient’s risk for abuse, dependency, and addiction using the validated Opioid Risk Tool available at https://www.[employer].com/rvzbku-wpfn-gbie-ort-narcotic-abuse.     Given the above, I believe the benefits of controlled substance therapy outweigh the risks. The reasons for prescribing controlled substances include non-narcotic, oral analgesic alternatives have been  inadequate for pain control. Accordingly, I have discussed the risk and benefits, treatment plan, and alternative therapies with the patient.   Overall impression that this patient is benefiting from opioid therapy and that the benefits outweigh the risks of continued use. yes   - Controlled Substance Use Agreement current  - Urine drug screen current and reviewed / compliant with rx medications  - Additional lab: CMP to assess liver and renal function - UTD   - Rx refill prescriptions are done for 3 months, No early refills.   - Referral to pain management no      4.  Overweight: Successful in losing about 30 pounds.  Doing well on semaglutide.  Reminded her of risk of bowel obstruction with constipation and she is preventing constipation.                 Please note that this dictation was created using voice recognition software. I have made every reasonable attempt to correct obvious errors, but I expect that there are errors of grammar and possibly content that I did not discover before finalizing the note.

## 2024-11-13 ENCOUNTER — APPOINTMENT (OUTPATIENT)
Dept: PULMONOLOGY | Facility: MEDICAL CENTER | Age: 69
End: 2024-11-13
Attending: NURSE PRACTITIONER
Payer: MEDICARE

## 2024-11-13 PROCEDURE — 94729 DIFFUSING CAPACITY: CPT

## 2024-11-13 PROCEDURE — 94060 EVALUATION OF WHEEZING: CPT | Mod: 26 | Performed by: NURSE PRACTITIONER

## 2024-11-13 PROCEDURE — 94729 DIFFUSING CAPACITY: CPT | Mod: 26 | Performed by: NURSE PRACTITIONER

## 2024-11-13 PROCEDURE — 94726 PLETHYSMOGRAPHY LUNG VOLUMES: CPT

## 2024-11-13 PROCEDURE — 94726 PLETHYSMOGRAPHY LUNG VOLUMES: CPT | Mod: 26 | Performed by: NURSE PRACTITIONER

## 2024-11-13 PROCEDURE — 94060 EVALUATION OF WHEEZING: CPT

## 2024-11-13 RX ORDER — ALBUTEROL SULFATE 5 MG/ML
2.5 SOLUTION RESPIRATORY (INHALATION)
Status: DISCONTINUED | OUTPATIENT
Start: 2024-11-13 | End: 2024-11-14 | Stop reason: HOSPADM

## 2024-11-13 RX ADMIN — ALBUTEROL SULFATE 2.5 MG: 5 SOLUTION RESPIRATORY (INHALATION) at 09:04

## 2024-11-14 NOTE — PROCEDURES
DATE OF SERVICE:  11/13/2024     PULMONARY FUNCTION TEST INTERPRETATION     REFERRING PROVIDER:  RENEE Parish     INTERPRETING PROVIDER:  Luiz Bowers III, MD     INTERPRETATION:    1.  There is no significant obstructive ventilatory defect on spirometry.    There is no significant response to bronchodilators.  2.  Lung volumes are preserved.  3.  Diffusion capacity is mildly reduced.  The decreased DLCO and normal DL/VA   ratio is consistent with a loss of whole lung units, which can be seen in conditions like atelectasis. Clinically correlate.  4.  Flow volume loop is consistent with the above interpretation.   5.  No prior PFTs for comparison.        ______________________________  MD JOHN Renteria III/IGNACIA    DD:  11/13/2024 17:27  DT:  11/13/2024 18:51    Job#:  [id number]

## 2025-01-09 ENCOUNTER — PATIENT MESSAGE (OUTPATIENT)
Dept: MEDICAL GROUP | Facility: LAB | Age: 70
End: 2025-01-09
Payer: MEDICARE

## 2025-01-09 DIAGNOSIS — E66.3 OVERWEIGHT (BMI 25.0-29.9): ICD-10-CM

## 2025-02-03 ENCOUNTER — APPOINTMENT (OUTPATIENT)
Dept: MEDICAL GROUP | Facility: LAB | Age: 70
End: 2025-02-03
Payer: MEDICARE

## 2025-02-03 VITALS
WEIGHT: 155 LBS | SYSTOLIC BLOOD PRESSURE: 106 MMHG | RESPIRATION RATE: 16 BRPM | HEART RATE: 82 BPM | HEIGHT: 66 IN | DIASTOLIC BLOOD PRESSURE: 60 MMHG | OXYGEN SATURATION: 92 % | TEMPERATURE: 96.4 F | BODY MASS INDEX: 24.91 KG/M2

## 2025-02-03 DIAGNOSIS — E55.9 VITAMIN D DEFICIENCY: ICD-10-CM

## 2025-02-03 DIAGNOSIS — M25.552 CHRONIC LEFT HIP PAIN: ICD-10-CM

## 2025-02-03 DIAGNOSIS — Z79.891 CHRONIC USE OF OPIATE FOR THERAPEUTIC PURPOSE: ICD-10-CM

## 2025-02-03 DIAGNOSIS — M48.062 SPINAL STENOSIS OF LUMBAR REGION WITH NEUROGENIC CLAUDICATION: ICD-10-CM

## 2025-02-03 DIAGNOSIS — R53.83 OTHER FATIGUE: ICD-10-CM

## 2025-02-03 DIAGNOSIS — R73.01 IMPAIRED FASTING GLUCOSE: ICD-10-CM

## 2025-02-03 DIAGNOSIS — G89.29 CHRONIC LEFT HIP PAIN: ICD-10-CM

## 2025-02-03 DIAGNOSIS — E66.3 OVERWEIGHT (BMI 25.0-29.9): ICD-10-CM

## 2025-02-03 PROCEDURE — 99214 OFFICE O/P EST MOD 30 MIN: CPT | Performed by: NURSE PRACTITIONER

## 2025-02-03 PROCEDURE — 3078F DIAST BP <80 MM HG: CPT | Performed by: NURSE PRACTITIONER

## 2025-02-03 PROCEDURE — 3074F SYST BP LT 130 MM HG: CPT | Performed by: NURSE PRACTITIONER

## 2025-02-03 RX ORDER — HYDROCODONE BITARTRATE AND ACETAMINOPHEN 7.5; 325 MG/1; MG/1
1 TABLET ORAL 2 TIMES DAILY PRN
Qty: 60 TABLET | Refills: 0 | Status: SHIPPED | OUTPATIENT
Start: 2025-03-31 | End: 2025-04-30

## 2025-02-03 RX ORDER — HYDROCODONE BITARTRATE AND ACETAMINOPHEN 7.5; 325 MG/1; MG/1
1 TABLET ORAL 2 TIMES DAILY PRN
Qty: 60 TABLET | Refills: 0 | Status: SHIPPED | OUTPATIENT
Start: 2025-03-01 | End: 2025-03-31

## 2025-02-03 RX ORDER — HYDROCODONE BITARTRATE AND ACETAMINOPHEN 7.5; 325 MG/1; MG/1
1 TABLET ORAL 2 TIMES DAILY PRN
Qty: 96 TABLET | Refills: 0 | Status: SHIPPED | OUTPATIENT
Start: 2025-04-30 | End: 2025-05-30

## 2025-02-03 RX ORDER — PHENTERMINE HYDROCHLORIDE 37.5 MG/1
37.5 TABLET ORAL
Qty: 30 TABLET | Refills: 2 | Status: SHIPPED | OUTPATIENT
Start: 2025-02-03 | End: 2025-03-05

## 2025-02-03 ASSESSMENT — PATIENT HEALTH QUESTIONNAIRE - PHQ9
9. THOUGHTS THAT YOU WOULD BE BETTER OFF DEAD, OR OF HURTING YOURSELF: NOT AT ALL
1. LITTLE INTEREST OR PLEASURE IN DOING THINGS: NOT AT ALL
2. FEELING DOWN, DEPRESSED, IRRITABLE, OR HOPELESS: NOT AT ALL
7. TROUBLE CONCENTRATING ON THINGS, SUCH AS READING THE NEWSPAPER OR WATCHING TELEVISION: NOT AT ALL
8. MOVING OR SPEAKING SO SLOWLY THAT OTHER PEOPLE COULD HAVE NOTICED. OR THE OPPOSITE, BEING SO FIGETY OR RESTLESS THAT YOU HAVE BEEN MOVING AROUND A LOT MORE THAN USUAL: NOT AT ALL
SUM OF ALL RESPONSES TO PHQ9 QUESTIONS 1 AND 2: 0
5. POOR APPETITE OR OVEREATING: NOT AT ALL
3. TROUBLE FALLING OR STAYING ASLEEP OR SLEEPING TOO MUCH: NOT AT ALL
6. FEELING BAD ABOUT YOURSELF - OR THAT YOU ARE A FAILURE OR HAVE LET YOURSELF OR YOUR FAMILY DOWN: NOT AL ALL
SUM OF ALL RESPONSES TO PHQ QUESTIONS 1-9: 0
4. FEELING TIRED OR HAVING LITTLE ENERGY: NOT AT ALL

## 2025-02-03 NOTE — PROGRESS NOTES
"Chief Complaint   Patient presents with    Chronic Opiate Therapy       HPI: Penelope is a 69-year-old established female here to follow-up on chronic pain and her weight.  She takes 2 hydrocodone per day for chronic pain in her left hip and her low back.  She has known degenerative disc disease and spinal stenosis of her lumbar spine.  She has significant arthritis of her left hip.  She has no interest in surgery, physical therapy or physiatry.  She denies significant constipation from her hydrocodone and feels that her current pain control is stable.    Overweight: Struggles with food choices.  Eats out a lot.  Her pain has lessened with weight loss and she would like to lose another 10 to 15 pounds.  Currently injecting compounded semaglutide which is very helpful but is is expensive.  Would like to go back on phentermine.  Has done well with phentermine in the past and denies any negative side effects such as heart palpitations, increased anxiety, insomnia while on phentermine.  Phentermine has helped her with increased motivation to exercise and better food choices as well as portion control.    /60 (BP Location: Right arm, Patient Position: Sitting, BP Cuff Size: Adult)   Pulse 82   Temp (!) 35.8 °C (96.4 °F)   Resp 16   Ht 1.676 m (5' 6\")   Wt 70.3 kg (155 lb)   SpO2 92%   Gen: NAD  Resp: nonlabored.  Able to speak in full sentences  Psy: pleasant / cooperative.   Neuro:  Alert and oriented x 3    Assessment and plan:  \"  1. Vitamin D deficiency  VITAMIN D 25-HYDROXY    TSH      2. Impaired fasting glucose  Comp Metabolic Panel    CBC WITH DIFFERENTIAL    HEMOGLOBIN A1C    TSH      3. Overweight (BMI 25.0-29.9)  TSH    phentermine (ADIPEX-P) 37.5 MG tablet      4. Other fatigue  TSH      5. Spinal stenosis of lumbar region with neurogenic claudication  HYDROcodone-acetaminophen (NORCO) 7.5-325 MG tab    HYDROcodone-acetaminophen (NORCO) 7.5-325 MG tab    HYDROcodone-acetaminophen (NORCO) 7.5-325 MG " "tab      6. Chronic left hip pain  HYDROcodone-acetaminophen (NORCO) 7.5-325 MG tab    HYDROcodone-acetaminophen (NORCO) 7.5-325 MG tab    HYDROcodone-acetaminophen (NORCO) 7.5-325 MG tab      7. Chronic use of opiate for therapeutic purpose        \"  Overall impression that this patient is benefiting from opioid therapy and that the benefits outweigh the risks of continued use. yes   - Controlled Substance Use Agreement current  - Urine drug screen current and reviewed / compliant with rx medications  - Additional lab: CMP to assess liver and renal function - UTD   - Rx refill prescriptions are done for 3 months, No early refills.   - Referral to pain management no    In prescribing controlled substances to this patient, I certify that I have obtained and reviewed the medical history of Penelope Bautista. I have also made a good abelardo effort to obtain applicable records from other providers who have treated the patient and records did not demonstrate any increased risk of substance abuse that would prevent me from prescribing controlled substances.     I have conducted a physical exam and documented it. I have reviewed Ms. Bautista’s prescription history as maintained by the [place] Prescription Monitoring Program.     I have assessed the patient’s risk for abuse, dependency, and addiction using the validated Opioid Risk Tool available at https://www.mdcalc.com/lluhve-vnzd-yljw-ort-narcotic-abuse.     Given the above, I believe the benefits of controlled substance therapy outweigh the risks. The reasons for prescribing controlled substances include non-narcotic, oral analgesic alternatives have been inadequate for pain control. Accordingly, I have discussed the risk and benefits, treatment plan, and alternative therapies with the patient.      Again she declines referrals to see physiatry, physical therapy, orthopedics or neurosurgery.  I encouraged her to exercise on a regular basis.  She is tolerating GLP-1 " agonist therapy but would like more help with weight and phentermine was added.  Encouraged her to maintain a weight at 140 approximately, not dropping below that due to risk of osteoporosis.  She will stop phentermine if she begins to have heart palpitations, racing heartbeat, blood pressures greater than 140/90, insomnia, increased anxiety, shakiness.    Her last blood work was about a year ago, recommend a full updated lab panel with the exception of lipids, hers are excellent and have been for many years without statin therapy.

## 2025-02-17 ENCOUNTER — PATIENT MESSAGE (OUTPATIENT)
Dept: MEDICAL GROUP | Facility: LAB | Age: 70
End: 2025-02-17
Payer: MEDICARE

## 2025-02-17 DIAGNOSIS — F51.01 PRIMARY INSOMNIA: ICD-10-CM

## 2025-02-18 RX ORDER — TRAZODONE HYDROCHLORIDE 50 MG/1
50 TABLET ORAL EVERY EVENING
Qty: 90 TABLET | Refills: 3 | Status: SHIPPED | OUTPATIENT
Start: 2025-02-18

## 2025-02-19 DIAGNOSIS — J45.40 MODERATE PERSISTENT ASTHMA WITHOUT COMPLICATION: ICD-10-CM

## 2025-02-19 RX ORDER — ALBUTEROL SULFATE 90 UG/1
INHALANT RESPIRATORY (INHALATION)
Qty: 42.5 G | Refills: 3 | Status: SHIPPED | OUTPATIENT
Start: 2025-02-19

## 2025-03-03 DIAGNOSIS — F51.01 PRIMARY INSOMNIA: ICD-10-CM

## 2025-03-03 RX ORDER — TRAZODONE HYDROCHLORIDE 50 MG/1
50 TABLET ORAL EVERY EVENING
Qty: 90 TABLET | Refills: 3 | Status: SHIPPED | OUTPATIENT
Start: 2025-03-03

## 2025-03-26 ENCOUNTER — HOSPITAL ENCOUNTER (OUTPATIENT)
Dept: LAB | Facility: MEDICAL CENTER | Age: 70
End: 2025-03-26
Attending: ANESTHESIOLOGY
Payer: MEDICARE

## 2025-03-26 LAB
BASOPHILS # BLD AUTO: 0.7 % (ref 0–1.8)
BASOPHILS # BLD: 0.07 K/UL (ref 0–0.12)
EOSINOPHIL # BLD AUTO: 0.23 K/UL (ref 0–0.51)
EOSINOPHIL NFR BLD: 2.4 % (ref 0–6.9)
ERYTHROCYTE [DISTWIDTH] IN BLOOD BY AUTOMATED COUNT: 45.2 FL (ref 35.9–50)
HCT VFR BLD AUTO: 40.9 % (ref 37–47)
HGB BLD-MCNC: 12.4 G/DL (ref 12–16)
IMM GRANULOCYTES # BLD AUTO: 0.04 K/UL (ref 0–0.11)
IMM GRANULOCYTES NFR BLD AUTO: 0.4 % (ref 0–0.9)
LYMPHOCYTES # BLD AUTO: 3.04 K/UL (ref 1–4.8)
LYMPHOCYTES NFR BLD: 32 % (ref 22–41)
MCH RBC QN AUTO: 29.5 PG (ref 27–33)
MCHC RBC AUTO-ENTMCNC: 30.3 G/DL (ref 32.2–35.5)
MCV RBC AUTO: 97.1 FL (ref 81.4–97.8)
MONOCYTES # BLD AUTO: 0.72 K/UL (ref 0–0.85)
MONOCYTES NFR BLD AUTO: 7.6 % (ref 0–13.4)
NEUTROPHILS # BLD AUTO: 5.4 K/UL (ref 1.82–7.42)
NEUTROPHILS NFR BLD: 56.9 % (ref 44–72)
NRBC # BLD AUTO: 0 K/UL
NRBC BLD-RTO: 0 /100 WBC (ref 0–0.2)
PLATELET # BLD AUTO: 335 K/UL (ref 164–446)
PMV BLD AUTO: 11.8 FL (ref 9–12.9)
RBC # BLD AUTO: 4.21 M/UL (ref 4.2–5.4)
WBC # BLD AUTO: 9.5 K/UL (ref 4.8–10.8)

## 2025-03-26 PROCEDURE — 85025 COMPLETE CBC W/AUTO DIFF WBC: CPT

## 2025-03-26 PROCEDURE — 36415 COLL VENOUS BLD VENIPUNCTURE: CPT

## 2025-04-17 ENCOUNTER — OFFICE VISIT (OUTPATIENT)
Dept: MEDICAL GROUP | Facility: LAB | Age: 70
End: 2025-04-17
Payer: MEDICARE

## 2025-04-17 VITALS
RESPIRATION RATE: 16 BRPM | OXYGEN SATURATION: 94 % | SYSTOLIC BLOOD PRESSURE: 100 MMHG | WEIGHT: 149 LBS | HEIGHT: 66 IN | BODY MASS INDEX: 23.95 KG/M2 | TEMPERATURE: 98.1 F | HEART RATE: 90 BPM | DIASTOLIC BLOOD PRESSURE: 70 MMHG

## 2025-04-17 DIAGNOSIS — M25.552 CHRONIC LEFT HIP PAIN: ICD-10-CM

## 2025-04-17 DIAGNOSIS — M17.12 PRIMARY OSTEOARTHRITIS OF LEFT KNEE: ICD-10-CM

## 2025-04-17 DIAGNOSIS — G89.29 CHRONIC LEFT HIP PAIN: ICD-10-CM

## 2025-04-17 DIAGNOSIS — F41.9 ANXIETY: ICD-10-CM

## 2025-04-17 DIAGNOSIS — M48.062 SPINAL STENOSIS OF LUMBAR REGION WITH NEUROGENIC CLAUDICATION: ICD-10-CM

## 2025-04-17 DIAGNOSIS — F43.9 STRESS: ICD-10-CM

## 2025-04-17 DIAGNOSIS — J96.11 CHRONIC RESPIRATORY FAILURE WITH HYPOXIA (HCC): ICD-10-CM

## 2025-04-17 DIAGNOSIS — G89.29 CHRONIC BILATERAL LOW BACK PAIN, UNSPECIFIED WHETHER SCIATICA PRESENT: ICD-10-CM

## 2025-04-17 DIAGNOSIS — G89.29 OTHER CHRONIC PAIN: ICD-10-CM

## 2025-04-17 DIAGNOSIS — Z79.891 CHRONIC USE OF OPIATE FOR THERAPEUTIC PURPOSE: ICD-10-CM

## 2025-04-17 DIAGNOSIS — J43.2 CENTRILOBULAR EMPHYSEMA (HCC): ICD-10-CM

## 2025-04-17 DIAGNOSIS — M54.50 CHRONIC BILATERAL LOW BACK PAIN, UNSPECIFIED WHETHER SCIATICA PRESENT: ICD-10-CM

## 2025-04-17 DIAGNOSIS — Z12.31 SCREENING MAMMOGRAM FOR BREAST CANCER: ICD-10-CM

## 2025-04-17 PROCEDURE — 3078F DIAST BP <80 MM HG: CPT | Performed by: NURSE PRACTITIONER

## 2025-04-17 PROCEDURE — 3074F SYST BP LT 130 MM HG: CPT | Performed by: NURSE PRACTITIONER

## 2025-04-17 PROCEDURE — 99214 OFFICE O/P EST MOD 30 MIN: CPT | Performed by: NURSE PRACTITIONER

## 2025-04-17 RX ORDER — CLONIDINE HYDROCHLORIDE 0.1 MG/1
0.1 TABLET ORAL 2 TIMES DAILY
Qty: 180 TABLET | Refills: 0 | Status: SHIPPED | OUTPATIENT
Start: 2025-04-17

## 2025-04-17 RX ORDER — HYDROCODONE BITARTRATE AND ACETAMINOPHEN 7.5; 325 MG/1; MG/1
1 TABLET ORAL 2 TIMES DAILY PRN
Qty: 96 TABLET | Refills: 0 | Status: SHIPPED | OUTPATIENT
Start: 2025-06-27 | End: 2025-07-27

## 2025-04-17 RX ORDER — HYDROCODONE BITARTRATE AND ACETAMINOPHEN 7.5; 325 MG/1; MG/1
1 TABLET ORAL 2 TIMES DAILY PRN
Qty: 60 TABLET | Refills: 0 | Status: SHIPPED | OUTPATIENT
Start: 2025-05-30 | End: 2025-06-29

## 2025-04-17 RX ORDER — HYDROCODONE BITARTRATE AND ACETAMINOPHEN 7.5; 325 MG/1; MG/1
1 TABLET ORAL 2 TIMES DAILY PRN
Qty: 96 TABLET | Refills: 0 | Status: SHIPPED | OUTPATIENT
Start: 2025-04-22 | End: 2025-04-22 | Stop reason: SDUPTHER

## 2025-04-17 NOTE — PROGRESS NOTES
"Verbal consent was acquired by the patient to use [employer] ambient listening note generation during this visit Yes      Subjective   Penelope Bautista is a 69 y.o. female who presents for follow-up  History of Present Illness  The patient is a 69-year-old female who presents for follow-up on chronic pain, weight management, and respiratory function.    Chronic respiratory failure with emphysema: Doing well per patient.  Intermittent episodes of dyspnea are reported, during which oxygen saturation levels are monitored. These levels typically register in the low 90s, specifically around 91 or 92. The patient notes that her oxygen saturation was 94 on the day of the visit.    Weight struggles: She has been administering semaglutide injections but expresses concern about their efficacy. After discontinuing the medication for a month, she perceives a diminished effect upon resumption compared to previous experiences. The medication is procured from a compounding pharmacy, which will no longer be able to supply it. She has one dose remaining and plans to discuss further management upon her return from travel.    A mammogram was last performed in 04/2023, and a new mammogram has been ordered.  She is not having breast pain.    She suffers from chronic pain throughout her spine and her knees.  Takes 2 hydrocodone per day for pain control which works well for her.  Denies negative side effects of hydrocodone.  She is leaving for [place] next Wednesday on 23 April and needs her pain medications refilled early as she will not be back until May 6.    Review of Systems  Negative except for HPI  Objective   /70 (BP Location: Left arm, Patient Position: Sitting, BP Cuff Size: Adult)   Pulse 90   Temp 36.7 °C (98.1 °F)   Resp 16   Ht 1.676 m (5' 6\")   Wt 67.6 kg (149 lb)   SpO2 94%   Physical Exam  Gen. appears healthy in no distress   Skin appropriate for sex and age   Neck trachea is midline  Lungs unlabored " breathing  Heart regular rate  Neuro gait and station normal   Psych appropriate, calm, interactive, well-groomed         Assessment & Plan  1.  Chronic pain in her spine and knees: Tylenol and ibuprofen alone have been insufficient to control her pain.  Overall impression that this patient is benefiting from opioid therapy and that the benefits outweigh the risks of continued use. yes   - Controlled Substance Use Agreement current  - Urine drug screen current and reviewed / compliant with rx medications  - Additional lab: CMP to assess liver and renal function - UTD   - Rx refill prescriptions are done for 3 months, No early refills.   - Referral to pain management no  In prescribing controlled substances to this patient, I certify that I have obtained and reviewed the medical history of Penelope Bautista. I have also made a good abelardo effort to obtain applicable records from other providers who have treated the patient and records did not demonstrate any increased risk of substance abuse that would prevent me from prescribing controlled substances.     I have conducted a physical exam and documented it. I have reviewed Ms. Bautista’s prescription history as maintained by the [place] Prescription Monitoring Program.     I have assessed the patient’s risk for abuse, dependency, and addiction using the validated Opioid Risk Tool available at https://www.mdcalc.com/tcqnuc-pznw-nymz-ort-narcotic-abuse.     Given the above, I believe the benefits of controlled substance therapy outweigh the risks. The reasons for prescribing controlled substances include non-narcotic, oral analgesic alternatives have been inadequate for pain control. Accordingly, I have discussed the risk and benefits, treatment plan, and alternative therapies with the patient.       2. Weight management.  Currently on the lowest dose of semaglutide.  Will be unable to fill that any longer due to lack of shortage of semaglutide.  Offered to start her  with the [employer] self-pay/cash pay program and she will consider, that would be tirzepatide.    3. Chronic respiratory failure:   Stable.  Continue  nighttime oxygen therapy, deep breathing exercises, daily physical exercise, Trelegy.    4. Health maintenance.  Blood pressure and weight are within normal ranges.  Treatment plan: A mammogram has been ordered, which can be scheduled at the patient's convenience. Discussed the importance of regular mammograms for early detection of breast cancer. Provided instructions on scheduling the mammogram through [facility] or by phone.               Please note that this dictation was created using voice recognition software. I have made every reasonable attempt to correct obvious errors, but I expect that there are errors of grammar and possibly content that I did not discover before finalizing the note.

## 2025-04-18 ENCOUNTER — TELEPHONE (OUTPATIENT)
Dept: MEDICAL GROUP | Facility: LAB | Age: 70
End: 2025-04-18
Payer: MEDICARE

## 2025-04-18 NOTE — TELEPHONE ENCOUNTER
HYDROcodone-acetaminophen (NORCO) 7.5-325 MG tab  please verify 2 of 3 scripts  #1 is for 60 BID= 30 days. The other 2 had #96 1 BID = 30 days???

## 2025-04-22 DIAGNOSIS — M25.552 CHRONIC LEFT HIP PAIN: ICD-10-CM

## 2025-04-22 DIAGNOSIS — G89.29 CHRONIC LEFT HIP PAIN: ICD-10-CM

## 2025-04-22 DIAGNOSIS — M48.062 SPINAL STENOSIS OF LUMBAR REGION WITH NEUROGENIC CLAUDICATION: ICD-10-CM

## 2025-04-22 DIAGNOSIS — G89.29 CHRONIC BILATERAL LOW BACK PAIN, UNSPECIFIED WHETHER SCIATICA PRESENT: ICD-10-CM

## 2025-04-22 DIAGNOSIS — M54.50 CHRONIC BILATERAL LOW BACK PAIN, UNSPECIFIED WHETHER SCIATICA PRESENT: ICD-10-CM

## 2025-04-22 RX ORDER — HYDROCODONE BITARTRATE AND ACETAMINOPHEN 7.5; 325 MG/1; MG/1
1 TABLET ORAL 2 TIMES DAILY PRN
Qty: 60 TABLET | Refills: 0 | Status: SHIPPED | OUTPATIENT
Start: 2025-04-22 | End: 2025-05-22

## 2025-05-23 ENCOUNTER — PATIENT MESSAGE (OUTPATIENT)
Dept: MEDICAL GROUP | Facility: LAB | Age: 70
End: 2025-05-23
Payer: MEDICARE

## 2025-05-29 RX ORDER — TIRZEPATIDE 2.5 MG/.5ML
2.5 INJECTION, SOLUTION SUBCUTANEOUS
Qty: 2 ML | Refills: 2 | Status: CANCELLED | OUTPATIENT
Start: 2025-05-29

## 2025-05-29 RX ORDER — TIRZEPATIDE 2.5 MG/.5ML
2.5 INJECTION, SOLUTION SUBCUTANEOUS
Qty: 2 ML | Refills: 3 | Status: SHIPPED | OUTPATIENT
Start: 2025-05-29

## 2025-05-29 RX ORDER — TIRZEPATIDE 2.5 MG/.5ML
2.5 INJECTION, SOLUTION SUBCUTANEOUS
COMMUNITY
End: 2025-05-29 | Stop reason: SDUPTHER

## 2025-05-29 RX ORDER — TIRZEPATIDE 2.5 MG/.5ML
2.5 INJECTION, SOLUTION SUBCUTANEOUS
COMMUNITY

## 2025-06-02 ENCOUNTER — PATIENT MESSAGE (OUTPATIENT)
Dept: MEDICAL GROUP | Facility: LAB | Age: 70
End: 2025-06-02
Payer: MEDICARE

## 2025-06-02 DIAGNOSIS — M54.50 CHRONIC BILATERAL LOW BACK PAIN, UNSPECIFIED WHETHER SCIATICA PRESENT: ICD-10-CM

## 2025-06-02 DIAGNOSIS — M25.552 CHRONIC LEFT HIP PAIN: ICD-10-CM

## 2025-06-02 DIAGNOSIS — G89.29 CHRONIC BILATERAL LOW BACK PAIN, UNSPECIFIED WHETHER SCIATICA PRESENT: ICD-10-CM

## 2025-06-02 DIAGNOSIS — M48.062 SPINAL STENOSIS OF LUMBAR REGION WITH NEUROGENIC CLAUDICATION: ICD-10-CM

## 2025-06-02 DIAGNOSIS — G89.29 CHRONIC LEFT HIP PAIN: ICD-10-CM

## 2025-06-03 DIAGNOSIS — G89.29 CHRONIC BILATERAL LOW BACK PAIN, UNSPECIFIED WHETHER SCIATICA PRESENT: ICD-10-CM

## 2025-06-03 DIAGNOSIS — M54.50 CHRONIC BILATERAL LOW BACK PAIN, UNSPECIFIED WHETHER SCIATICA PRESENT: ICD-10-CM

## 2025-06-03 DIAGNOSIS — M48.062 SPINAL STENOSIS OF LUMBAR REGION WITH NEUROGENIC CLAUDICATION: ICD-10-CM

## 2025-06-03 DIAGNOSIS — M25.552 CHRONIC LEFT HIP PAIN: ICD-10-CM

## 2025-06-03 DIAGNOSIS — G89.29 CHRONIC LEFT HIP PAIN: ICD-10-CM

## 2025-06-03 RX ORDER — HYDROCODONE BITARTRATE AND ACETAMINOPHEN 7.5; 325 MG/1; MG/1
1 TABLET ORAL 2 TIMES DAILY PRN
Qty: 60 TABLET | Refills: 0 | Status: SHIPPED | OUTPATIENT
Start: 2025-06-27 | End: 2025-07-27

## 2025-06-11 ENCOUNTER — APPOINTMENT (OUTPATIENT)
Dept: RADIOLOGY | Facility: MEDICAL CENTER | Age: 70
End: 2025-06-11
Attending: NURSE PRACTITIONER
Payer: MEDICARE

## 2025-07-10 ENCOUNTER — APPOINTMENT (OUTPATIENT)
Dept: MEDICAL GROUP | Facility: LAB | Age: 70
End: 2025-07-10
Payer: MEDICARE

## 2025-07-10 ENCOUNTER — PATIENT MESSAGE (OUTPATIENT)
Dept: MEDICAL GROUP | Facility: LAB | Age: 70
End: 2025-07-10

## 2025-07-10 VITALS
OXYGEN SATURATION: 93 % | HEIGHT: 66 IN | SYSTOLIC BLOOD PRESSURE: 118 MMHG | BODY MASS INDEX: 24.75 KG/M2 | WEIGHT: 154 LBS | DIASTOLIC BLOOD PRESSURE: 70 MMHG | HEART RATE: 82 BPM | TEMPERATURE: 98.4 F | RESPIRATION RATE: 16 BRPM

## 2025-07-10 DIAGNOSIS — M54.50 CHRONIC BILATERAL LOW BACK PAIN, UNSPECIFIED WHETHER SCIATICA PRESENT: ICD-10-CM

## 2025-07-10 DIAGNOSIS — Z98.890 S/P THORACOTOMY: ICD-10-CM

## 2025-07-10 DIAGNOSIS — Z79.891 CHRONIC USE OF OPIATE FOR THERAPEUTIC PURPOSE: ICD-10-CM

## 2025-07-10 DIAGNOSIS — G89.29 CHRONIC BILATERAL LOW BACK PAIN, UNSPECIFIED WHETHER SCIATICA PRESENT: ICD-10-CM

## 2025-07-10 DIAGNOSIS — G89.29 CHRONIC LEFT HIP PAIN: ICD-10-CM

## 2025-07-10 DIAGNOSIS — M25.552 CHRONIC LEFT HIP PAIN: ICD-10-CM

## 2025-07-10 DIAGNOSIS — M48.062 SPINAL STENOSIS OF LUMBAR REGION WITH NEUROGENIC CLAUDICATION: ICD-10-CM

## 2025-07-10 PROCEDURE — 99214 OFFICE O/P EST MOD 30 MIN: CPT | Performed by: NURSE PRACTITIONER

## 2025-07-10 PROCEDURE — 3074F SYST BP LT 130 MM HG: CPT | Performed by: NURSE PRACTITIONER

## 2025-07-10 PROCEDURE — 3078F DIAST BP <80 MM HG: CPT | Performed by: NURSE PRACTITIONER

## 2025-07-10 RX ORDER — HYDROCODONE BITARTRATE AND ACETAMINOPHEN 7.5; 325 MG/1; MG/1
1 TABLET ORAL 2 TIMES DAILY PRN
Qty: 60 TABLET | Refills: 0 | Status: SHIPPED | OUTPATIENT
Start: 2025-10-05 | End: 2025-11-04

## 2025-07-10 RX ORDER — HYDROCODONE BITARTRATE AND ACETAMINOPHEN 7.5; 325 MG/1; MG/1
1 TABLET ORAL 2 TIMES DAILY PRN
Qty: 60 TABLET | Refills: 0 | Status: SHIPPED | OUTPATIENT
Start: 2025-09-05 | End: 2025-10-05

## 2025-07-10 RX ORDER — HYDROCODONE BITARTRATE AND ACETAMINOPHEN 7.5; 325 MG/1; MG/1
1 TABLET ORAL 2 TIMES DAILY PRN
Qty: 60 TABLET | Refills: 0 | Status: SHIPPED | OUTPATIENT
Start: 2025-08-06 | End: 2025-09-05

## 2025-07-10 NOTE — PROGRESS NOTES
Chief Complaint   Patient presents with    Chronic Opiate Therapy         HPI:   Chronic pain recheck for: Chronic pain follow-up related to chronic pain in her entire spine, but particularly her thoracic spine.  Also has pain in her hips.  Last dose of controlled substance: Today  Chronic pain treated with hydrocodone taken two daily  PEG score:  What number best describes your pain on average in the past week: 1-10:  What number in the past week past describes how much pain has interfered with enjoyment of life?  1 - 10:  What number on average describes how much pain has interfered with general activity? 1-10:      Interval history:   Any major change in health since last appointment? No    Consequences of Chronic Opiate therapy:  (5 A's)  Analgesia: Compared to no treatment or prior treatment, pain is currently not changed  Activity: not changed  Adverse Events: denies constipation, dry mouth, itchy skin, nausea, and sedation  Aberrant Behaviors: She reports she is taking medication as prescribed and is not veering from agreed treatment regimen or provider recommendations. There have been no inappropriate refills or lost/stolen meds reported.  Affect/Mood: Pain is not impacting patient's mood.  Patient denies depression/anxiety.    Nonnarcotic treatments that are being used: Gabapentin, topical agents, ice, and heat.     Last imaging: Within the past 12 months    Opioid Risk Score: 1    Interpretation of Opioid Risk Score   Score 0-3 = Low risk of abuse. Do UDS at least once per year.  Score 4-7 = Moderate risk of abuse. Do UDS 1-4 times per year.  Score 8+ = High risk of abuse. Refer to specialist.    Last order of CONTROLLED SUBSTANCE TREATMENT AGREEMENT was found on 12/28/2017 from Office Visit on 12/28/2017     UDS Summary                  URINE DRUG SCREEN Next Due 8/11/2019      Done 8/16/2018 [facility] PAIN MANAGEMENT SCREEN     Patient has more history with this topic...          Most recent UDS  reviewed today and is consistent with prescribed medications.     I have reviewed the medical records, the Prescription Monitoring Program and I have determined that controlled substance treatment is medically indicated.         Past medical, surgical, family, and social history is reviewed and updated in Epic chart by me today.   Medications and allergies reviewed and updated in Epic chart by me today.     ROS:   As documented in history of present illness above    Exam:    Constitutional: Alert, no distress, plus 3 vital signs  Skin:  Warm, dry, no rashes invisible areas  Eye: Equal, round and reactive, conjunctiva clear  ENMT: Lips without lesions, good dentition, oropharynx clear    Neck: Trachea midline, no masses, no thyromegaly  Respiratory: Unlabored respiration, lungs clear to auscultation, no wheezes, no rhonchi  Cardiovascular: Normal rate and rhythm, no murmur, no edema  Abdomen: Soft, nontender, no masses or hepatosplenomegaly  Psych: Alert, pleasant, well-groomed, normal affect    Assessment/ Plan / Medical Decision makin. Chronic pain from:      2. Chronic use of opiate drugs therapeutic purposes  -Overall impression that this patient is benefiting from opioid therapy and that the benefits outweigh the risks of continued use. yes   - Controlled Substance Use Agreement updated today  - Urine drug screen updated today  - Additional lab: CMP to assess liver and renal function - UTD   - Rx refill prescriptions are done for 3 months, No early refills.   - Referral to pain management no  In prescribing controlled substances to this patient, I certify that I have obtained and reviewed the medical history.. I have also made a good abelardo effort to obtain applicable records from other providers who have treated the patient and records demonstrating the following: She did receive opiates from her plastic surgeon as she states she was unaware that she should call me if she needs any additional opiate  therapy.     I have conducted a physical exam and documented it. I have reviewed this patient's prescription history as maintained by the [place] Prescription Monitoring Program.     I have assessed the patient’s risk for abuse, dependency, and addiction using the validated Opioid Risk Tool available at https://www.mdcalc.com/kidjki-ceao-leib-ort-narcotic-abuse.     Given the above, I believe the benefits of controlled substance therapy outweigh the risks. The reasons for prescribing controlled substances include non-narcotic, oral analgesic alternatives have been inadequate for pain control. Accordingly, I have discussed the risk and benefits, treatment plan, and alternative therapies with the patient.     We had a good discussion that if she receives opioids from any other provider beyond me again, I will no longer be able to provide her opiate therapy.  She may only receive opiates in the hospital during an emergency from someone else.    3.  Overweight: Doing well on semaglutide although this is very expensive for her through a compounded pharmacy.  She would like to change to tirzepatide and will message me when she gets home regarding her current semaglutide dosage.  Current BMI is 24-25, she would like to lose 10-15 more pounds which would be good for her back and hips.  She understands potential side effects of a GLP-1 such as constipation, rare risk of thyroid cancer, pancreatitis, early satiety, nausea.

## 2025-07-14 ENCOUNTER — HOSPITAL ENCOUNTER (OUTPATIENT)
Facility: MEDICAL CENTER | Age: 70
End: 2025-07-14
Attending: NURSE PRACTITIONER
Payer: MEDICARE

## 2025-07-14 DIAGNOSIS — Z79.891 CHRONIC USE OF OPIATE FOR THERAPEUTIC PURPOSE: ICD-10-CM

## 2025-07-14 PROCEDURE — G0482 DRUG TEST DEF 15-21 CLASSES: HCPCS

## 2025-07-18 DIAGNOSIS — F41.9 ANXIETY: ICD-10-CM

## 2025-07-18 DIAGNOSIS — F43.9 STRESS: ICD-10-CM

## 2025-07-18 LAB
1OH-MIDAZOLAM UR QL SCN: NOT DETECTED
6MAM UR QL: NOT DETECTED
7AMINOCLONAZEPAM UR QL: NOT DETECTED
A-OH ALPRAZ UR QL: NOT DETECTED
ALPRAZ UR QL: NOT DETECTED
AMPHET UR QL SCN: NOT DETECTED
ANNOTATION COMMENT IMP: ABNORMAL
BARBITURATES UR QL: NEGATIVE
BUPRENORPHINE UR QL: NOT DETECTED
BZE UR QL: NEGATIVE
CARBOXYTHC UR QL: NEGATIVE
CARISOPRODOL UR QL: NEGATIVE
CLONAZEPAM UR QL: NOT DETECTED
CODEINE UR QL: NOT DETECTED
CREAT UR-MCNC: 72.8 MG/DL (ref 20–400)
DIAZEPAM UR QL: NOT DETECTED
ETHYL GLUCURONIDE UR QL: NEGATIVE
FENTANYL UR QL: NOT DETECTED
GABAPENTIN UR QL CFM: PRESENT
HYDROCODONE UR QL: PRESENT
HYDROMORPHONE UR QL: PRESENT
LORAZEPAM UR QL: NOT DETECTED
MDA UR QL: NOT DETECTED
MDEA UR QL: NOT DETECTED
MDMA UR QL: NOT DETECTED
ME-PHENIDATE UR QL: NOT DETECTED
METHADONE UR QL: NEGATIVE
METHAMPHET UR QL: NOT DETECTED
MIDAZOLAM UR QL SCN: NOT DETECTED
MORPHINE UR QL: NOT DETECTED
NALOXONE UR QL CFM: NOT DETECTED
NORBUPRENORPHINE UR QL CFM: NOT DETECTED
NORDIAZEPAM UR QL: NOT DETECTED
NORFENTANYL UR QL: PRESENT
NORHYDROCODONE UR QL CFM: PRESENT
NORMEPERIDINE UR QL CFM: NOT DETECTED
NOROXYCODONE UR QL CFM: NOT DETECTED
NOROXYMORPHONE UR QL SCN: NOT DETECTED
OXAZEPAM UR QL: NOT DETECTED
OXYCODONE UR QL: NOT DETECTED
OXYMORPHONE UR QL: NOT DETECTED
PATHOLOGY STUDY: ABNORMAL
PCP UR QL: NEGATIVE
PHENTERMINE UR QL: NOT DETECTED
PREGABALIN UR QL CFM: NOT DETECTED
SERVICE CMNT-IMP: ABNORMAL
TAPENTADOL UR QL SCN: NOT DETECTED
TAPENTADOL UR QL SCN: NOT DETECTED
TEMAZEPAM UR QL: NOT DETECTED
TRAMADOL UR QL: NEGATIVE
ZOLPIDEM PHENYL-4-CARB UR QL SCN: NOT DETECTED
ZOLPIDEM UR QL: NOT DETECTED

## 2025-07-20 RX ORDER — CLONIDINE HYDROCHLORIDE 0.1 MG/1
0.1 TABLET ORAL 2 TIMES DAILY
Qty: 180 TABLET | Refills: 0 | Status: SHIPPED | OUTPATIENT
Start: 2025-07-20

## 2025-07-21 PROBLEM — R82.5 POSITIVE URINE DRUG SCREEN: Status: ACTIVE | Noted: 2025-07-21

## 2025-07-22 RX ORDER — TIRZEPATIDE 5 MG/.5ML
5 INJECTION, SOLUTION SUBCUTANEOUS
Qty: 2 ML | Refills: 0 | Status: SHIPPED | OUTPATIENT
Start: 2025-07-22 | End: 2025-08-19

## 2025-08-06 DIAGNOSIS — F51.01 PRIMARY INSOMNIA: ICD-10-CM

## 2025-08-06 DIAGNOSIS — G25.81 RESTLESS LEG SYNDROME: ICD-10-CM

## 2025-08-06 RX ORDER — QUETIAPINE FUMARATE 50 MG/1
50 TABLET, FILM COATED ORAL EVERY EVENING
Qty: 90 TABLET | Refills: 3 | Status: SHIPPED | OUTPATIENT
Start: 2025-08-06

## 2025-08-06 RX ORDER — GABAPENTIN 300 MG/1
CAPSULE ORAL
Qty: 270 CAPSULE | Refills: 3 | Status: SHIPPED | OUTPATIENT
Start: 2025-08-06

## 2025-08-08 DIAGNOSIS — Z00.6 CLINICAL TRIAL PARTICIPANT: ICD-10-CM

## 2025-08-22 ENCOUNTER — APPOINTMENT (OUTPATIENT)
Dept: LAB | Facility: MEDICAL CENTER | Age: 70
End: 2025-08-22
Attending: FAMILY MEDICINE
Payer: MEDICARE